# Patient Record
Sex: FEMALE | Race: WHITE | NOT HISPANIC OR LATINO | Employment: FULL TIME | URBAN - METROPOLITAN AREA
[De-identification: names, ages, dates, MRNs, and addresses within clinical notes are randomized per-mention and may not be internally consistent; named-entity substitution may affect disease eponyms.]

---

## 2017-04-11 DIAGNOSIS — L70.8 OTHER ACNE: ICD-10-CM

## 2017-04-11 NOTE — TELEPHONE ENCOUNTER
norgestim-eth estrad triphasic (ORTHO TRI-CYCLEN, 28,) 0.18/0.215/0.25 MG-35 MCG      Last Written Prescription Date: 5/27/16  Last Fill Quantity: 84,  # refills: 3   Last Office Visit with G, P or [facility] prescribing provider: 5/27/16                                         Next 5 appointments (look out 90 days)     May 05, 2017  1:00 PM CDT   MyChart Physical Adult with GUANAKO Damian CNP   [facility]    [address] [address] [phone]

## 2017-04-13 RX ORDER — NORGESTIMATE AND ETHINYL ESTRADIOL 7DAYSX3 28
1 KIT ORAL DAILY
Qty: 84 TABLET | Refills: 3 | OUTPATIENT
Start: 2017-04-13

## 2017-04-13 NOTE — TELEPHONE ENCOUNTER
Pt should have enough med to last till end of May, pt has an upcoming appointment for px on 05/05. Called pharmacy, they notifies that pt should have enough to last for another month. So, will disregard the rx for now.     Kelley, RN  Triage Nurse

## 2017-05-05 ENCOUNTER — OFFICE VISIT (OUTPATIENT)
Dept: FAMILY MEDICINE | Facility: CLINIC | Age: 27
End: 2017-05-05
Payer: COMMERCIAL

## 2017-05-05 VITALS
HEIGHT: 70 IN | WEIGHT: 156.8 LBS | OXYGEN SATURATION: 100 % | BODY MASS INDEX: 22.45 KG/M2 | DIASTOLIC BLOOD PRESSURE: 62 MMHG | HEART RATE: 80 BPM | SYSTOLIC BLOOD PRESSURE: 110 MMHG | RESPIRATION RATE: 16 BRPM | TEMPERATURE: 97.8 F

## 2017-05-05 DIAGNOSIS — L70.9 ACNE: ICD-10-CM

## 2017-05-05 DIAGNOSIS — Z00.00 ROUTINE GENERAL MEDICAL EXAMINATION AT A HEALTH CARE FACILITY: Primary | ICD-10-CM

## 2017-05-05 DIAGNOSIS — L70.8 OTHER ACNE: ICD-10-CM

## 2017-05-05 DIAGNOSIS — Z11.51 SCREENING FOR HUMAN PAPILLOMAVIRUS: ICD-10-CM

## 2017-05-05 PROCEDURE — 99395 PREV VISIT EST AGE 18-39: CPT | Performed by: NURSE PRACTITIONER

## 2017-05-05 PROCEDURE — G0145 SCR C/V CYTO,THINLAYER,RESCR: HCPCS | Performed by: NURSE PRACTITIONER

## 2017-05-05 RX ORDER — NORGESTIMATE AND ETHINYL ESTRADIOL 7DAYSX3 28
1 KIT ORAL DAILY
Qty: 84 TABLET | Refills: 3 | Status: SHIPPED | OUTPATIENT
Start: 2017-05-05 | End: 2017-09-08 | Stop reason: ALTCHOICE

## 2017-05-05 RX ORDER — SULFAMETHOXAZOLE/TRIMETHOPRIM 800-160 MG
1 TABLET ORAL 2 TIMES DAILY
Qty: 60 TABLET | Refills: 1 | Status: SHIPPED | OUTPATIENT
Start: 2017-05-05 | End: 2017-06-09

## 2017-05-05 NOTE — MR AVS SNAPSHOT
After Visit Summary   5/5/2017    Rach Peterson    MRN: [id number]           Patient Information     Date Of Birth          1990        Visit Information        Provider Department      5/5/2017 1:00 PM Claudine Garcia APRN CNP [facility]        Today's Diagnoses     Acne    -  1    Screening for human papillomavirus          Care Instructions      Take bactrim for acne twice a day until you start seeing moderate to significant improvement in acne.  Then try to go to once per day.  Please follow up with me in about 6 weeks.    Watch for new rash.  If you get one stop the bactrim and let us see you            Follow-ups after your visit        Who to contact     If you have questions or need follow up information about today's clinic visit or your schedule please contact [facility] directly at [phone].  Normal or non-critical lab and imaging results will be communicated to you by Qufenqihart, letter or phone within 4 business days after the clinic has received the results. If you do not hear from us within 7 days, please contact the clinic through Qufenqihart or phone. If you have a critical or abnormal lab result, we will notify you by phone as soon as possible.  Submit refill requests through Mobivity or call your pharmacy and they will forward the refill request to us. Please allow 3 business days for your refill to be completed.          Additional Information About Your Visit        MyChart Information     Mobivity gives you secure access to your electronic health record. If you see a primary care provider, you can also send messages to your care team and make appointments. If you have questions, please call your primary care clinic.  If you do not have a primary care provider, please call [phone] and they will assist you.        Care EveryWhere ID     This is your Care EveryWhere ID. This could be used by other organizations to access your [facility]  "medical records  [id number]        Your Vitals Were     Pulse Temperature Respirations Height Pulse Oximetry BMI (Body Mass Index)    80 97.8  F (36.6  C) (Oral) 16 5' 9.5\" (1.765 m) 100% 22.82 kg/m2       Blood Pressure from Last 3 Encounters:   05/05/17 110/62   05/27/16 120/82   05/22/15 110/68    Weight from Last 3 Encounters:   05/05/17 156 lb 12.8 oz (71.1 kg)   05/27/16 148 lb 4.8 oz (67.3 kg)   05/22/15 149 lb (67.6 kg)              We Performed the Following     PAP imaged thin layer screen reflex to HPV if ASCUS - recommended age 25 - 29 years          Today's Medication Changes          These changes are accurate as of: 5/5/17  1:28 PM.  If you have any questions, ask your nurse or doctor.               Start taking these medicines.        Dose/Directions    sulfamethoxazole-trimethoprim 800-160 MG per tablet   Commonly known as:  BACTRIM DS/SEPTRA DS   Used for:  Acne   Started by:  Claudine Garcia APRN CNP        Dose:  1 tablet   Take 1 tablet by mouth 2 times daily   Quantity:  60 tablet   Refills:  1            Where to get your medicines      These medications were sent to [id number] - [place], [place] - [address], [place] [address]     Phone:  [phone]     sulfamethoxazole-trimethoprim 800-160 MG per tablet                Primary Care Provider Office Phone # Fax #    GUANAKO Damian [phone]       [facility] [address] [address] [facility] [address]        Thank you!     Thank you for choosing [facility]  for your care. Our goal is always to provide you with excellent care. Hearing back from our patients is one way we can continue to improve our services. Please take a few minutes to complete the written survey that you may receive in the mail after your visit with us. Thank you!             Your Updated Medication List - Protect others around you: Learn how to " safely use, store and throw away your medicines at www.disposemymeds.org.          This list is accurate as of: 5/5/17  1:28 PM.  Always use your most recent med list.                   Brand Name Dispense Instructions for use    norgestim-eth estrad triphasic 0.18/0.215/0.25 MG-35 MCG per tablet    ORTHO TRI-CYCLEN (28)    84 tablet    Take 1 tablet by mouth daily       sulfamethoxazole-trimethoprim 800-160 MG per tablet    BACTRIM DS/SEPTRA DS    60 tablet    Take 1 tablet by mouth 2 times daily       valACYclovir 500 MG tablet    VALTREX    90 tablet    Take 1 tablet (500 mg) by mouth daily

## 2017-05-05 NOTE — NURSING NOTE
"Chief Complaint   Patient presents with     Physical       Initial /62 (BP Location: Right arm, Patient Position: Chair, Cuff Size: Adult Regular)  Pulse 80  Temp 97.8  F (36.6  C) (Oral)  Resp 16  Ht 5' 9.5\" (1.765 m)  Wt 156 lb 12.8 oz (71.1 kg)  SpO2 100%  BMI 22.82 kg/m2 Estimated body mass index is 22.82 kg/(m^2) as calculated from the following:    Height as of this encounter: 5' 9.5\" (1.765 m).    Weight as of this encounter: 156 lb 12.8 oz (71.1 kg).  Medication Reconciliation: complete   uYli Lemus MA      "

## 2017-05-05 NOTE — PATIENT INSTRUCTIONS
Take bactrim for acne twice a day until you start seeing moderate to significant improvement in acne.  Then try to go to once per day.  Please follow up with me in about 6 weeks.    Watch for new rash.  If you get one stop the bactrim and let us see you

## 2017-05-05 NOTE — PROGRESS NOTES
SUBJECTIVE:     CC: Rach Peterson is an 26 year old woman who presents for preventive health visit.     Physical   Annual:     Getting at least 3 servings of Calcium per day::  Yes    Bi-annual eye exam::  Yes    Dental care twice a year::  NO    Sleep apnea or symptoms of sleep apnea::  None    Diet::  Regular (no restrictions)    Frequency of exercise::  4-5 days/week    Duration of exercise::  45-60 minutes    Taking medications regularly::  Yes    Medication side effects::  None    Additional concerns today::  No        Today's PHQ-2 Score:   PHQ-2 ( 1999 [employer]) 5/5/2017   Q1: Little interest or pleasure in doing things 0   Q2: Feeling down, depressed or hopeless 0   PHQ-2 Score 0   Little interest or pleasure in doing things -   Feeling down, depressed or hopeless -   PHQ-2 Score -     Abuse: Current or Past(Physical, Sexual or Emotional) - No  Do you feel safe in your environment - Yes    Social History   Substance Use Topics     Smoking status: Never Smoker     Smokeless tobacco: Never Used      Comment: Non smoking home     Alcohol use Yes      Comment: four drinks once every two weeks.     The patient does not drink >3 drinks per day nor >7 drinks per week.    No results for input(s): CHOL, HDL, LDL, TRIG, CHOLHDLRATIO, NHDL in the last 28992 hours.    Reviewed orders with patient.  Reviewed health maintenance and updated orders accordingly - Yes    Mammo Decision Support:  Mammogram not appropriate for this patient based on age.    Pertinent mammograms are reviewed under the imaging tab.  History of abnormal Pap smear: NO - age 21-29 PAP every 3 years recommended    Reviewed and updated as needed this visit by clinical staff  Tobacco  Allergies  Meds  Problems  Med Hx  Surg Hx  Fam Hx  Soc Hx          Reviewed and updated as needed this visit by Provider  Allergies  Meds  Problems          REVIEW OF SYSTEMS:  C: NEGATIVE for fever, chills, or change in weight  I: Hx of Acne - Has used  "topicals and stopped them due to increased redness and dryness.  Acne has worsened significantly; NEGATIVE for worrisome rashes, moles or lesions  E: NEGATIVE for vision changes or irritation  E/M: NEGATIVE for ear, mouth and throat problems  R: NEGATIVE for significant cough or SOB  CV: NEGATIVE for chest pain, palpitations or peripheral edema  GI: NEGATIVE for nausea, abdominal pain, heartburn, or change in bowel habits  B: NEGATIVE for masses, tenderness, or nipple discharge  : NEGATIVE for unusual urinary or vaginal symptoms; Periods are regular  M: NEGATIVE for significant arthralgias or myalgia  N: NEGATIVE for weakness, dizziness or paresthesias  E: NEGATIVE for temperature intolerance, or skin/hair changes  P: NEGATIVE for changes in mood or affect    This document serves as a record of the services and decisions personally performed and made by Claudine Garcia NP. It was created on her behalf by Sharmin Vang, a trained medical scribe. The creation of this document is based on the provider's statements to the medical scribe.  Sharmin Vang, March 24, 2017, 1:09 PM     Problem list, Medication list, Allergies, and Medical/Social/Surgical histories reviewed in [facility] and updated as appropriate.  Labs reviewed in EPIC  OBJECTIVE:     /62 (BP Location: Right arm, Patient Position: Chair, Cuff Size: Adult Regular)  Pulse 80  Temp 97.8  F (36.6  C) (Oral)  Resp 16  Ht 5' 9.5\" (1.765 m)  Wt 156 lb 12.8 oz (71.1 kg)  SpO2 100%  BMI 22.82 kg/m2    EXAM:  GENERAL: Patient appears healthy, alert and not distressed.  EYES: Eyes appear grossly normal to inspection, with normal conjunctivae and sclerae  HENT: Ear canals and TM's appear normal, nose and mouth are without ulcers or lesions, oropharynx is clear and oral mucous membranes are moist  NECK: No adenopathy present, no asymmetry, masses, or scars noted, thyroid is normal to palpation  RESP: Lungs are clear to auscultation - no rales, rhonchi or " "wheezes present  CV: Regular rate and rhythm, normal S1 S2 heart sounds, no ectopy, no peripheral edema present, peripheral pulses normal, no carotid bruit.  ABDOMEN: Soft, nontender, no hepatosplenomegaly, no masses, normal bowel sounds  MS: No gross musculoskeletal defects noted, no edema, gait is age appropriate without ataxia  SKIN: No suspicious rashes, lesions, or moles.  2+ papulopustular and mild cystic acne noted.  Predominately forehead and central face  NEURO: Patient exhibits normal strength and tone, normal speech and mentation  PSYCH: Mentation appears normal, affect is normal/bright    Diagnostic Test Results:  No results found for this or any previous visit (from the past 24 hour(s)).     ASSESSMENT/PLAN:         ICD-10-CM    1. Routine general medical examination at a health care facility Z00.00 PAP imaged thin layer screen reflex to HPV if ASCUS - recommended age 25 - 29 years   2. Acne L70.9 sulfamethoxazole-trimethoprim (BACTRIM DS/SEPTRA DS) 800-160 MG per tablet   3. Screening for human papillomavirus Z11.51 PAP imaged thin layer screen reflex to HPV if ASCUS - recommended age 25 - 29 years   4. Other acne L70.8 norgestim-eth estrad triphasic (ORTHO TRI-CYCLEN, 28,) 0.18/0.215/0.25 MG-35 MCG per tablet       COUNSELING:  Reviewed preventive health counseling, as reflected in patient instructions  Special attention given to:        Regular exercise       Healthy diet/nutrition     reports that she has never smoked. She has never used smokeless tobacco.    Estimated body mass index is 22.82 kg/(m^2) as calculated from the following:    Height as of this encounter: 5' 9.5\" (1.765 m).    Weight as of this encounter: 156 lb 12.8 oz (71.1 kg).       Counseling Resources:  ATP IV Guidelines  Pooled Cohorts Equation Calculator  Breast Cancer Risk Calculator  FRAX Risk Assessment  ICSI Preventive Guidelines  Dietary Guidelines for Americans, 2010  USDA's MyPlate  ASA Prophylaxis  Lung CA " Screening    The information in this document, created by a medical scribe for me, accurately reflects the services I personally performed and the decisions made by me. I have reviewed and approved this document for accuracy.  Claudine Garcia, May 5, 2017, 1:29 PM     GUANAKO Damian [facility]

## 2017-05-06 ENCOUNTER — MYC MEDICAL ADVICE (OUTPATIENT)
Dept: FAMILY MEDICINE | Facility: CLINIC | Age: 27
End: 2017-05-06

## 2017-05-10 LAB
COPATH REPORT: NORMAL
PAP: NORMAL

## 2017-06-09 ENCOUNTER — OFFICE VISIT (OUTPATIENT)
Dept: FAMILY MEDICINE | Facility: CLINIC | Age: 27
End: 2017-06-09
Payer: COMMERCIAL

## 2017-06-09 VITALS
TEMPERATURE: 97.8 F | BODY MASS INDEX: 22.6 KG/M2 | HEIGHT: 70 IN | HEART RATE: 74 BPM | SYSTOLIC BLOOD PRESSURE: 116 MMHG | RESPIRATION RATE: 16 BRPM | DIASTOLIC BLOOD PRESSURE: 62 MMHG | OXYGEN SATURATION: 98 % | WEIGHT: 157.9 LBS

## 2017-06-09 DIAGNOSIS — L70.0 ACNE VULGARIS: ICD-10-CM

## 2017-06-09 DIAGNOSIS — Z11.8 SPECIAL SCREENING EXAMINATION FOR CHLAMYDIAL DISEASE: Primary | ICD-10-CM

## 2017-06-09 PROCEDURE — 99213 OFFICE O/P EST LOW 20 MIN: CPT | Performed by: NURSE PRACTITIONER

## 2017-06-09 RX ORDER — CEFUROXIME AXETIL 500 MG/1
500 TABLET ORAL 2 TIMES DAILY
Qty: 60 TABLET | Refills: 0 | Status: SHIPPED | OUTPATIENT
Start: 2017-06-09 | End: 2017-07-07

## 2017-06-09 NOTE — NURSING NOTE
"Chief Complaint   Patient presents with     Derm Problem       Initial /62 (BP Location: Right arm, Patient Position: Chair, Cuff Size: Adult Regular)  Pulse 74  Temp 97.8  F (36.6  C) (Oral)  Resp 16  Ht 5' 9.5\" (1.765 m)  Wt 157 lb 14.4 oz (71.6 kg)  SpO2 98%  BMI 22.98 kg/m2 Estimated body mass index is 22.98 kg/(m^2) as calculated from the following:    Height as of this encounter: 5' 9.5\" (1.765 m).    Weight as of this encounter: 157 lb 14.4 oz (71.6 kg).  Medication Reconciliation: complete   Yuli Lemus MA       "

## 2017-06-09 NOTE — MR AVS SNAPSHOT
After Visit Summary   6/9/2017    Rach Peterson    MRN: [id number]           Patient Information     Date Of Birth          1990        Visit Information        Provider Department      6/9/2017 3:00 PM Claudine Garcia APRN CNP [facility]        Today's Diagnoses     Special screening examination for chlamydial disease    -  1    Acne vulgaris           Follow-ups after your visit        Follow-up notes from your care team     Return in about 1 month (around 7/9/2017) for Recheck.      Who to contact     If you have questions or need follow up information about today's clinic visit or your schedule please contact [facility] directly at [phone].  Normal or non-critical lab and imaging results will be communicated to you by MyChart, letter or phone within 4 business days after the clinic has received the results. If you do not hear from us within 7 days, please contact the clinic through [email or web address] or phone. If you have a critical or abnormal lab result, we will notify you by phone as soon as possible.  Submit refill requests through TeleCommunication Systems or call your pharmacy and they will forward the refill request to us. Please allow 3 business days for your refill to be completed.          Additional Information About Your Visit        MyChart Information     TeleCommun[employer] Systems gives you secure access to your electronic health record. If you see a primary care provider, you can also send messages to your care team and make appointments. If you have questions, please call your primary care clinic.  If you do not have a primary care provider, please call [phone] and they will assist you.        Care EveryWhere ID     This is your Care EveryWhere ID. This could be used by other organizations to access your [facility] medical records  [id number]        Your Vitals Were     Pulse Temperature Respirations Height Pulse Oximetry BMI (Body Mass Index)    74 97.8  F (36.6  C)  "(Oral) 16 5' 9.5\" (1.765 m) 98% 22.98 kg/m2       Blood Pressure from Last 3 Encounters:   06/09/17 116/62   05/05/17 110/62   05/27/16 120/82    Weight from Last 3 Encounters:   06/09/17 157 lb 14.4 oz (71.6 kg)   05/05/17 156 lb 12.8 oz (71.1 kg)   05/27/16 148 lb 4.8 oz (67.3 kg)              Today, you had the following     No orders found for display         Today's Medication Changes          These changes are accurate as of: 6/9/17  3:26 PM.  If you have any questions, ask your nurse or doctor.               Start taking these medicines.        Dose/Directions    cefuroxime 500 MG tablet   Commonly known as:  CEFTIN   Used for:  Acne vulgaris   Started by:  Claudine Garcia APRN CNP        Dose:  500 mg   Take 1 tablet (500 mg) by mouth 2 times daily   Quantity:  60 tablet   Refills:  0         Stop taking these medicines if you haven't already. Please contact your care team if you have questions.     sulfamethoxazole-trimethoprim 800-160 MG per tablet   Commonly known as:  BACTRIM DS/SEPTRA DS   Stopped by:  Claudine Garcia APRN CNP                Where to get your medicines      These medications were sent to [id number] - [place] - [address]  [address] [facility]  [address] [place] [address]     Phone:  [phone]     cefuroxime 500 MG tablet                Primary Care Provider Office Phone # Fax #    GUANAKO Damian [phone]       [facility] [address]  [facility] [address]        Thank you!     Thank you for choosing [facility]  for your care. Our goal is always to provide you with excellent care. Hearing back from our patients is one way we can continue to improve our services. Please take a few minutes to complete the written survey that you may receive in the mail after your visit with us. Thank you!             Your Updated Medication List - Protect others around you: Learn how to " safely use, store and throw away your medicines at www.disposemymeds.org.          This list is accurate as of: 6/9/17  3:26 PM.  Always use your most recent med list.                   Brand Name Dispense Instructions for use    cefuroxime 500 MG tablet    CEFTIN    60 tablet    Take 1 tablet (500 mg) by mouth 2 times daily       norgestim-eth estrad triphasic 0.18/0.215/0.25 MG-35 MCG per tablet    ORTHO TRI-CYCLEN (28)    84 tablet    Take 1 tablet by mouth daily       valACYclovir 500 MG tablet    VALTREX    90 tablet    Take 1 tablet (500 mg) by mouth daily

## 2017-06-09 NOTE — PROGRESS NOTES
"  SUBJECTIVE:                                                    Rach Peterson is a 26 year old female who presents to clinic today for the following health issues:    Acne       Duration: ***    Description (location/character/radiation): ***    Intensity:  {mild,moderate,severe:178061}    Accompanying signs and symptoms: ***    History (similar episodes/previous evaluation): {.:571477::\"None\"}    Precipitating or alleviating factors: {.:486167::\"None\"}    Therapies tried and outcome: {.:314182::\"None\"}       Other Concerns:  ***    Problem list and histories reviewed & adjusted, as indicated.  Additional history: as documented    Labs reviewed in EPIC    Reviewed and updated as needed this visit by clinical staff       Reviewed and updated as needed this visit by Provider       REVIEW OF SYSTEMS:  C: NEGATIVE for fever, chills, or change in weight  I: NEGATIVE for worrisome rashes, moles or lesions***  E: NEGATIVE for vision changes or irritation  E/M: NEGATIVE for ear, mouth and throat problems  R: NEGATIVE for significant cough or SOB  CV: NEGATIVE for chest pain, palpitations or peripheral edema  GI: NEGATIVE for nausea, abdominal pain, heartburn, or change in bowel habits  B: NEGATIVE for masses, tenderness, or nipple discharge***  : NEGATIVE for unusual urinary or vaginal symptoms; Periods are regular***  M: NEGATIVE for significant arthralgias or myalgia  N: NEGATIVE for weakness, dizziness or paresthesias  E: NEGATIVE for temperature intolerance, or skin/hair changes  H: NEGATIVE for bleeding problems***  P: NEGATIVE for changes in mood or affect    This document serves as a record of the services and decisions personally performed and made by Claudine Garcia NP. It was created on her behalf by Sharmin Vang, a trained medical scribe. The creation of this document is based on the provider's statements to the medical scribe.  Davonte Lopez, June 9, 2017, ***    OBJECTIVE:                         "                            There were no vitals taken for this visit.  There is no height or weight on file to calculate BMI.    EXAM:  GENERAL: Patient appears healthy, alert and not distressed.  EYES: Eyes appear grossly normal to inspection, with normal conjunctivae and sclerae  HENT: Ear canals and TM's appear normal, nose and mouth are without ulcers or lesions, oropharynx is clear and oral mucous membranes are moist***  NECK: No adenopathy present, no asymmetry, masses, or scars noted, thyroid is normal to palpation***  RESP: Lungs are clear to auscultation - no rales, rhonchi or wheezes present  CV: Regular rate and rhythm, normal S1 S2 heart sounds, no ectopy, no peripheral edema present, peripheral pulses normal, no carotid bruit.  ABDOMEN: Soft, nontender, no hepatosplenomegaly, no masses, normal bowel sounds  MS: No gross musculoskeletal defects noted, no edema, gait is age appropriate without ataxia  SKIN: No suspicious rashes, lesions, or moles.***  NEURO: Patient exhibits normal strength and tone, normal speech and mentation  PSYCH: Mentation appears normal, affect is normal/bright    Diagnostic Test Results:  No results found for this or any previous visit (from the past 24 hour(s)).      ASSESSMENT/PLAN:                                                              ICD-10-CM    1. Special screening examination for chlamydial disease Z11.8        There are no Patient Instructions on file for this visit.     The information in this document, created by a medical scribe for me, accurately reflects the services I personally performed and the decisions made by me. I have reviewed and approved this document for accuracy.  Claudine Garcia, June 9, 2017, ***    GUANAKO Damian [facility]

## 2017-06-09 NOTE — PROGRESS NOTES
"  SUBJECTIVE:                                                    Rach Peterson is a 26 year old female who presents to clinic today for the following health issues:    Pt is here to follow up on her acne. Pt states that it has not improved since being seeing last.       Reviewed and updated as needed this visit by clinical staff       Reviewed and updated as needed this visit by Provider         ROS:  Constitutional, HEENT, cardiovascular, pulmonary, gi and gu systems are negative, except as otherwise noted.    OBJECTIVE:     /62 (BP Location: Right arm, Patient Position: Chair, Cuff Size: Adult Regular)  Pulse 74  Temp 97.8  F (36.6  C) (Oral)  Resp 16  Ht 5' 9.5\" (1.765 m)  Wt 157 lb 14.4 oz (71.6 kg)  SpO2 98%  BMI 22.98 kg/m2  Body mass index is 22.98 kg/(m^2).  Moderate papulopusular acen mildly improved from last visit.  Irritatino and redness is improved..  No cystic acne noted. Today.    Diagnostic Test Results:  none     ASSESSMENT/PLAN:     A/P:    ICD-10-CM    1. Special screening examination for chlamydial disease Z11.8    2. Acne vulgaris L70.0 cefuroxime (CEFTIN) 500 MG tablet      Change to ceftin short term and follow up in 6 weeks.  Improving and will try to wean antibiotics  As indicated. Pt is opposed to accutane.  Is opposed to topicals.  Is tearful and hopeful that she can improve.      GUANAKO Damian [facility] [place]  "

## 2017-07-07 ENCOUNTER — OFFICE VISIT (OUTPATIENT)
Dept: FAMILY MEDICINE | Facility: CLINIC | Age: 27
End: 2017-07-07
Payer: COMMERCIAL

## 2017-07-07 VITALS
TEMPERATURE: 98.9 F | SYSTOLIC BLOOD PRESSURE: 114 MMHG | OXYGEN SATURATION: 98 % | WEIGHT: 155 LBS | BODY MASS INDEX: 22.56 KG/M2 | DIASTOLIC BLOOD PRESSURE: 72 MMHG | HEART RATE: 68 BPM

## 2017-07-07 DIAGNOSIS — L70.0 ACNE VULGARIS: ICD-10-CM

## 2017-07-07 PROCEDURE — 99213 OFFICE O/P EST LOW 20 MIN: CPT | Performed by: NURSE PRACTITIONER

## 2017-07-07 RX ORDER — CEFUROXIME AXETIL 500 MG/1
500 TABLET ORAL 2 TIMES DAILY
Qty: 120 TABLET | Refills: 0 | Status: SHIPPED | OUTPATIENT
Start: 2017-07-07 | End: 2017-10-20 | Stop reason: ALTCHOICE

## 2017-07-07 NOTE — MR AVS SNAPSHOT
After Visit Summary   7/7/2017    Rach Peterson    MRN: [id number]           Patient Information     Date Of Birth          1990        Visit Information        Provider Department      7/7/2017 3:00 PM Claudine Garcia APRN CNP [facility]        Today's Diagnoses     Acne vulgaris          Care Instructions    Use yogurt to replenish good bacteria    Start trying to wean ceftin in 4 weeks.    Follow up with me in 8 weeks or as needed.          Follow-ups after your visit        Who to contact     If you have questions or need follow up information about today's clinic visit or your schedule please contact [facility] directly at [phone].  Normal or non-critical lab and imaging results will be communicated to you by MyChart, letter or phone within 4 business days after the clinic has received the results. If you do not hear from us within 7 days, please contact the clinic through [employer] or phone. If you have a critical or abnormal lab result, we will notify you by phone as soon as possible.  Submit refill requests through [employer] or call your pharmacy and they will forward the refill request to us. Please allow 3 business days for your refill to be completed.          Additional Information About Your Visit        MyChart Information     SPH[employer]ES gives you secure access to your electronic health record. If you see a primary care provider, you can also send messages to your care team and make appointments. If you have questions, please call your primary care clinic.  If you do not have a primary care provider, please call [phone] and they will assist you.        Care EveryWhere ID     This is your Care EveryWhere ID. This could be used by other organizations to access your [place] medical records  [id number]        Your Vitals Were     Pulse Temperature Pulse Oximetry BMI (Body Mass Index)          68 98.9  F (37.2  C) (Oral) 98% 22.56 kg/m2          Blood Pressure from Last 3 Encounters:   07/07/17 114/72   06/09/17 116/62   05/05/17 110/62    Weight from Last 3 Encounters:   07/07/17 155 lb (70.3 kg)   06/09/17 157 lb 14.4 oz (71.6 kg)   05/05/17 156 lb 12.8 oz (71.1 kg)              Today, you had the following     No orders found for display         Where to get your medicines      These medications were sent to [id number] [place] - [address]  AT [facility]  [address] [place] [address]     Phone:  [phone]     cefuroxime 500 MG tablet          Primary Care Provider Office Phone # Fax #    Claudine Garcia APRSUPRIYA [phone]       [facility] [address] [address] [facility] [address]        Equal Access to Services     HINA SALDANA AH: Hadii aad ku hadasho Soomaali, waaxda luqadaha, qaybta kaalmada adeegyada, waxay simain hayjuan manueln mitali quiroga . So [facility] [phone].    ATENCIÓN: Si habla español, tiene a quiñones disposición servicios gratuitos de asistencia lingüística. Llame al [phone].    We comply with applicable federal civil rights laws and [place] laws. We do not discriminate on the basis of race, color, national origin, age, disability sex, sexual orientation or gender identity.            Thank you!     Thank you for choosing [facility]  for your care. Our goal is always to provide you with excellent care. Hearing back from our patients is one way we can continue to improve our services. Please take a few minutes to complete the written survey that you may receive in the mail after your visit with us. Thank you!             Your Updated Medication List - Protect others around you: Learn how to safely use, store and throw away your medicines at www.disposemymeds.org.          This list is accurate as of: 7/7/17  3:56 PM.  Always use your most recent med list.                   Brand Name Dispense Instructions for use Diagnosis     cefuroxime 500 MG tablet    CEFTIN    120 tablet    Take 1 tablet (500 mg) by mouth 2 times daily    Acne vulgaris       norgestim-eth estrad triphasic 0.18/0.215/0.25 MG-35 MCG per tablet    ORTHO TRI-CYCLEN (28)    84 tablet    Take 1 tablet by mouth daily    Other acne       valACYclovir 500 MG tablet    VALTREX    90 tablet    Take 1 tablet (500 mg) by mouth daily    Herpes simplex type 2 infection

## 2017-07-07 NOTE — PATIENT INSTRUCTIONS
Use yogurt to replenish good bacteria    Start trying to wean ceftin in 4 weeks.    Follow up with me in 8 weeks or as needed.

## 2017-07-07 NOTE — NURSING NOTE
"Chief Complaint   Patient presents with     RECHECK       Initial /72 (BP Location: Right arm, Patient Position: Chair, Cuff Size: Adult Regular)  Pulse 68  Temp 98.9  F (37.2  C) (Oral)  Wt 155 lb (70.3 kg)  SpO2 98%  BMI 22.56 kg/m2 Estimated body mass index is 22.56 kg/(m^2) as calculated from the following:    Height as of 6/9/17: 5' 9.5\" (1.765 m).    Weight as of this encounter: 155 lb (70.3 kg).  Medication Reconciliation: complete.Nan FREIRE MA      "

## 2017-07-07 NOTE — PROGRESS NOTES
SUBJECTIVE:                                                    Rach Peterson is a 26 year old female who presents to clinic today for the following health issues:    1month follow up on acne vulgaris.     Other Concerns:  none    Problem list and histories reviewed & adjusted, as indicated.  Additional history: as documented    Labs reviewed in EPIC    Reviewed and updated as needed this visit by clinical staff  Tobacco  Allergies  Meds  Problems  Med Hx  Surg Hx  Fam Hx  Soc Hx        Reviewed and updated as needed this visit by Provider  Allergies  Meds  Problems       REVIEW OF SYSTEMS:  I: NEGATIVE for worrisome rashes, moles or lesions  GI: NEGATIVE for nausea, abdominal pain, heartburn, or change in bowel habits  P: NEGATIVE for changes in mood or affect    This document serves as a record of the services and decisions personally performed and made by Claudine Garcia NP. It was created on her behalf by Davonte Lopez, a trained medical scribe. The creation of this document is based on the provider's statements to the medical scribe.  Davonte Lopez, July 7, 2017,     OBJECTIVE:   /72 (BP Location: Right arm, Patient Position: Chair, Cuff Size: Adult Regular)  Pulse 68  Temp 98.9  F (37.2  C) (Oral)  Wt 155 lb (70.3 kg)  SpO2 98%  BMI 22.56 kg/m2  Body mass index is 22.56 kg/(m^2).    EXAM:  GENERAL: Patient appears healthy, alert and not distressed.  PSYCH: Mentation appears normal, affect is normal/bright  SKIN:  Acne is about 50% improved    Diagnostic Test Results:  No results found for this or any previous visit (from the past 24 hour(s)).     ASSESSMENT/PLAN:       ICD-10-CM    1. Acne vulgaris L70.0 cefuroxime (CEFTIN) 500 MG tablet     Improved significantly.  Patient is quite happy.  Will continue meds and then wean.  Pt understands this med won't be long term therapy.    Patient Instructions   Use yogurt to replenish good bacteria    Start trying to wean ceftin in 4  weeks.    Follow up with me in 8 weeks or as needed.      The information in this document, created by a medical scribe for me, accurately reflects the services I personally performed and the decisions made by me. I have reviewed and approved this document for accuracy.  Claudine Garcia, July 7, 2017,    GUANAKO Damian [facility]

## 2017-09-08 ENCOUNTER — OFFICE VISIT (OUTPATIENT)
Dept: FAMILY MEDICINE | Facility: CLINIC | Age: 27
End: 2017-09-08
Payer: COMMERCIAL

## 2017-09-08 VITALS
HEART RATE: 71 BPM | OXYGEN SATURATION: 100 % | BODY MASS INDEX: 23 KG/M2 | WEIGHT: 158 LBS | TEMPERATURE: 98.6 F | SYSTOLIC BLOOD PRESSURE: 110 MMHG | DIASTOLIC BLOOD PRESSURE: 70 MMHG

## 2017-09-08 DIAGNOSIS — L70.8 OTHER ACNE: ICD-10-CM

## 2017-09-08 DIAGNOSIS — Z30.41 ENCOUNTER FOR SURVEILLANCE OF CONTRACEPTIVE PILLS: Primary | ICD-10-CM

## 2017-09-08 PROCEDURE — 99213 OFFICE O/P EST LOW 20 MIN: CPT | Performed by: NURSE PRACTITIONER

## 2017-09-08 RX ORDER — DROSPIRENONE AND ETHINYL ESTRADIOL 0.03MG-3MG
1 KIT ORAL DAILY
Qty: 84 TABLET | Refills: 3 | Status: SHIPPED | OUTPATIENT
Start: 2017-09-08 | End: 2018-05-25

## 2017-09-08 NOTE — PROGRESS NOTES
"   SUBJECTIVE:   CC: Rach Peterson is an 26 year old woman who presents for preventive health visit.     HPI  {Outside tests to abstract? :292626}    {additional problems to add (Optional):193867}    Today's PHQ-2 Score:   PHQ-2 ( 1999 [employer]) 5/5/2017   Q1: Little interest or pleasure in doing things 0   Q2: Feeling down, depressed or hopeless 0   PHQ-2 Score 0   Q1: Little interest or pleasure in doing things -   Q2: Feeling down, depressed or hopeless -   PHQ-2 Score -       Abuse: Current or Past(Physical, Sexual or Emotional)- {YES/NO/NA:690454}  Do you feel safe in your environment - {YES/NO/NA:975516}    Social History   Substance Use Topics     Smoking status: Never Smoker     Smokeless tobacco: Never Used      Comment: Non smoking home     Alcohol use Yes      Comment: four drinks once every two weeks.     {ETOH AUDIT:127410}    Reviewed orders with patient.  Reviewed health maintenance and updated orders accordingly - {Yes/No:135157::\"Yes\"}  {Chronicprobdata (Optional):588707}    {Mammo Decision Support (Optional):749180}    Pertinent mammograms are reviewed under the imaging tab.  History of abnormal Pap smear: {PAP HX:522690}    Reviewed and updated as needed this visit by clinical staff  Tobacco  Allergies  Meds  Problems  Med Hx  Surg Hx  Fam Hx  Soc Hx          Reviewed and updated as needed this visit by Provider  Allergies  Meds  Problems        {HISTORY OPTIONS (Optional):628750}    ROS:  {FEMALE PREVENTATIVE ROS:098141}     OBJECTIVE:   /70 (BP Location: Right arm, Patient Position: Chair, Cuff Size: Adult Regular)  Pulse 71  Temp 98.6  F (37  C) (Oral)  Wt 71.7 kg (158 lb)  SpO2 100%  BMI 23 kg/m2  EXAM:  {Exam Choices:417348}    ASSESSMENT/PLAN:   {Diag Picklist:975184}    COUNSELING:  {FEMALE COUNSELING MESSAGES:478678::\"Reviewed preventive health counseling, as reflected in patient instructions\"}    {BP Counseling- Complete if BP >= 120/80  (Optional):245166}     " "reports that she has never smoked. She has never used smokeless tobacco.  {Tobacco Cessation -- Complete if patient is a smoker (Optional):353831}  Estimated body mass index is 23 kg/(m^2) as calculated from the following:    Height as of 6/9/17: 1.765 m (5' 9.5\").    Weight as of this encounter: 71.7 kg (158 lb).   {Weight Management Plan (ACO) Complete if BMI is abnormal-  Ages 18-64  BMI >24.9.  Age 65+ with BMI <23 or >30 (Optional):039523}    Counseling Resources:  ATP IV Guidelines  Pooled Cohorts Equation Calculator  Breast Cancer Risk Calculator  FRAX Risk Assessment  ICSI Preventive Guidelines  Dietary Guidelines for Americans, 2010  USDA's MyPlate  ASA Prophylaxis  Lung CA Screening    GUANAKO Damian [facility]  "

## 2017-09-08 NOTE — PATIENT INSTRUCTIONS
Switch to Farideh for 6-8 weeks after menstruation.     Continue taking Ceftin. Request a refill through [employer] if needed.     Eat more yogurt and consider taking a probiotic.    Okay to schedule e-visit.

## 2017-09-08 NOTE — NURSING NOTE
"Chief Complaint   Patient presents with     RECHECK     acne       Initial /70 (BP Location: Right arm, Patient Position: Chair, Cuff Size: Adult Regular)  Pulse 71  Temp 98.6  F (37  C) (Oral)  Wt 158 lb (71.7 kg)  SpO2 100%  BMI 23 kg/m2 Estimated body mass index is 23 kg/(m^2) as calculated from the following:    Height as of 6/9/17: 5' 9.5\" (1.765 m).    Weight as of this encounter: 158 lb (71.7 kg).  Medication Reconciliation: complete.Nan FREIRE MA      "

## 2017-09-08 NOTE — MR AVS SNAPSHOT
After Visit Summary   9/8/2017    Rach Peterson    MRN: [id number]           Patient Information     Date Of Birth          1990        Visit Information        Provider Department      9/8/2017 1:00 PM Claudine Garcia APRN CNP [facility]        Today's Diagnoses     Encounter for surveillance of contraceptive pills    -  1      Care Instructions    Switch to Farideh for 6-8 weeks after menstruation.     Continue taking Ceftin. Request a refill through [employer] if needed.     Eat more yogurt and consider taking a probiotic.              Follow-ups after your visit        Who to contact     If you have questions or need follow up information about today's clinic visit or your schedule please contact [facility] directly at [phone].  Normal or non-critical lab and imaging results will be communicated to you by MyChart, letter or phone within 4 business days after the clinic has received the results. If you do not hear from us within 7 days, please contact the clinic through [employer] or phone. If you have a critical or abnormal lab result, we will notify you by phone as soon as possible.  Submit refill requests through [employer] or call your pharmacy and they will forward the refill request to us. Please allow 3 business days for your refill to be completed.          Additional Information About Your Visit        MyChart Information     [employer] gives you secure access to your electronic health record. If you see a primary care provider, you can also send messages to your care team and make appointments. If you have questions, please call your primary care clinic.  If you do not have a primary care provider, please call [phone] and they will assist you.        Care EveryWhere ID     This is your Care EveryWhere ID. This could be used by other organizations to access your [place] medical records  [id number]        Your Vitals Were     Pulse Temperature  Pulse Oximetry BMI (Body Mass Index)          71 98.6  F (37  C) (Oral) 100% 23 kg/m2         Blood Pressure from Last 3 Encounters:   09/08/17 110/70   07/07/17 114/72   06/09/17 116/62    Weight from Last 3 Encounters:   09/08/17 158 lb (71.7 kg)   07/07/17 155 lb (70.3 kg)   06/09/17 157 lb 14.4 oz (71.6 kg)              Today, you had the following     No orders found for display         Today's Medication Changes          These changes are accurate as of: 9/8/17  1:20 PM.  If you have any questions, ask your nurse or doctor.               Start taking these medicines.        Dose/Directions    drospirenone-ethinyl estradiol 3-0.03 MG per tablet   Commonly known as:  CHARITY   Used for:  Encounter for surveillance of contraceptive pills   Started by:  Claudine Garcia APRN CNP        Dose:  1 tablet   Take 1 tablet by mouth daily   Quantity:  84 tablet   Refills:  3         Stop taking these medicines if you haven't already. Please contact your care team if you have questions.     norgestim-eth estrad triphasic 0.18/0.215/0.25 MG-35 MCG per tablet   Commonly known as:  ORTHO TRI-CYCLEN (28)   Stopped by:  Claudine Garcia APRN CNP                Where to get your medicines      These medications were sent to [id number] - [place], [place] - [address], [place] [address]     Phone:  [phone]     drospirenone-ethinyl estradiol 3-0.03 MG per tablet                Primary Care Provider Office Phone # Fax #    GUANAKO Damian [phone]       [facility] [address] [facility] [address]        Equal Access to Services     LESLI SALDANA AH: Connie Austin, wachrisda luqadaha, qaybta kaalmada yessica, reena acosta. So [facility] [phone].    ATENCIÓN: Si habla español, tiene a quiñones disposición servicios gratuitos de asistencia lingüística. Llame al  [phone].    We comply with applicable federal civil rights laws and [place] laws. We do not discriminate on the basis of race, color, national origin, age, disability sex, sexual orientation or gender identity.            Thank you!     Thank you for choosing [facility]  for your care. Our goal is always to provide you with excellent care. Hearing back from our patients is one way we can continue to improve our services. Please take a few minutes to complete the written survey that you may receive in the mail after your visit with us. Thank you!             Your Updated Medication List - Protect others around you: Learn how to safely use, store and throw away your medicines at www.disposemymeds.org.          This list is accurate as of: 9/8/17  1:20 PM.  Always use your most recent med list.                   Brand Name Dispense Instructions for use Diagnosis    cefuroxime 500 MG tablet    CEFTIN    120 tablet    Take 1 tablet (500 mg) by mouth 2 times daily    Acne vulgaris       drospirenone-ethinyl estradiol 3-0.03 MG per tablet    CHARITY    84 tablet    Take 1 tablet by mouth daily    Encounter for surveillance of contraceptive pills       valACYclovir 500 MG tablet    VALTREX    90 tablet    Take 1 tablet (500 mg) by mouth daily    Herpes simplex type 2 infection

## 2017-09-08 NOTE — PROGRESS NOTES
SUBJECTIVE:   Rach Peterson is a 26 year old female who presents to clinic today for the following health issues:    Acne  Patient reports acne has improved significantly and is very pleased. She is now taking ceftin only once every other day, because taking it every day was hard on her stomach. She notes she is open to changing her OCP to Farideh but inquires about possible risks. She is not currently taking a probiotic, but occasionally eats yogurt.      Social History   Went to [place] and [place] for ten days. She will be starting a new job on Tuesday.       Problem list and histories reviewed & adjusted, as indicated.  Additional history: as documented    Labs reviewed in EPIC    Reviewed and updated as needed this visit by clinical staff  Tobacco  Allergies  Meds  Problems  Med Hx  Surg Hx  Fam Hx  Soc Hx        Reviewed and updated as needed this visit by Provider  Allergies  Meds  Problems             Patient Active Problem List   Diagnosis     Other acne     CARDIOVASCULAR SCREENING; LDL GOAL LESS THAN 160     Past Surgical History:   Procedure Laterality Date     HC REMOVE TONSILS/ADENOIDS,<12 Y/O      T & A <13y.o.       Social History   Substance Use Topics     Smoking status: Never Smoker     Smokeless tobacco: Never Used      Comment: Non smoking home     Alcohol use Yes      Comment: four drinks once every two weeks.     Family History   Problem Relation Age of Onset     CANCER Maternal Aunt      Melanoma           ROS:  C: NEGATIVE for fever, chills, change in weight  INTEGUMENTARY/SKIN: NEGATIVE for worrisome rashes, moles or lesions  PSYCHIATRIC: NEGATIVE for changes in mood or affect    This document serves as a record of the services and decisions personally performed and made by GUANAKO Damian CNP. It was created on his/her behalf by Alexis Hernández, a trained medical scribe. The creation of this document is based the provider's statements to the medical  pati.  Pati Alexis Hernández 1:07 PM, September 8, 2017    OBJECTIVE:                                                    /70 (BP Location: Right arm, Patient Position: Chair, Cuff Size: Adult Regular)  Pulse 71  Temp 98.6  F (37  C) (Oral)  Wt 158 lb (71.7 kg)  SpO2 100%  BMI 23 kg/m2  Body mass index is 23 kg/(m^2).  GENERAL APPEARANCE: healthy, alert and no distress  SKIN: Acne is essentially resolved.  Healing noted with post inflammatory changes. No acute lesions.  PSYCH: mentation appears normal and affect normal/bright    Diagnostic Test Results:  none      ASSESSMENT/PLAN:                                                    A/P:    ICD-10-CM    1. Encounter for surveillance of contraceptive pills Z30.41 drospirenone-ethinyl estradiol (FARIDEH) 3-0.03 MG per tablet   2. Other acne L70.8      Acne much improved.  Trial of Farideh to see if further maintenance and improvement.  The wean antibiotics off.  Pt aware of risks of antibiotics use.    Total visit time 15; over one half spent discussing current issues.    Patient Instructions   Switch to Farideh for 6-8 weeks after menstruation.     Continue taking Ceftin. Request a refill through [employer] if needed.     Eat more yogurt and consider taking a probiotic.    Okay to schedule e-visit.         The information in this document, created by the medical scribe for me, accurately reflects the services I personally performed and the decisions made by me. I have reviewed and approved this document for accuracy prior to leaving the patient care area.  1:20 PM, 09/08/17    GUANAKO Damian [facility]

## 2017-10-13 ENCOUNTER — E-VISIT (OUTPATIENT)
Dept: FAMILY MEDICINE | Facility: CLINIC | Age: 27
End: 2017-10-13
Payer: COMMERCIAL

## 2017-10-13 DIAGNOSIS — L70.0 ACNE VULGARIS: Primary | ICD-10-CM

## 2017-10-13 PROCEDURE — 99444 ZZC PHYSICIAN ONLINE EVALUATION & MANAGEMENT SERVICE: CPT | Performed by: NURSE PRACTITIONER

## 2017-10-20 RX ORDER — DOXYCYCLINE HYCLATE 100 MG
100 TABLET ORAL 2 TIMES DAILY
Qty: 60 TABLET | Refills: 2 | Status: SHIPPED | OUTPATIENT
Start: 2017-10-20 | End: 2018-05-02

## 2018-05-02 DIAGNOSIS — L70.0 ACNE VULGARIS: ICD-10-CM

## 2018-05-02 NOTE — TELEPHONE ENCOUNTER
doxycycline (VIBRA-TABS) 100 MG tablet      Last Written Prescription Date:  10/20/2017  Last Fill Quantity: 60 tablet,   # refills: 2  Last Office Visit: 09/08/2017 Claudine Garcia APRN CNP   Future Office visit:    Next 5 appointments (look out 90 days)     May 25, 2018  3:00 PM CDT   [facility] with GUANAKO Damian CNP   [address] [address] [phone]                   Routing refill request to provider for review/approval because:  Drug not on the [facility], [facility] or  Health refill protocol or controlled substance  Drug not active on patient's medication list

## 2018-05-04 RX ORDER — DOXYCYCLINE HYCLATE 100 MG
TABLET ORAL
Qty: 60 TABLET | Refills: 4 | Status: SHIPPED | OUTPATIENT
Start: 2018-05-04 | End: 2018-05-25

## 2018-05-04 NOTE — TELEPHONE ENCOUNTER
"10/21/17 per PCP:\"Since you have had a change in your birth control pills it may work when it didn't in the past.  I would be comfortable with you being on this long term if needed.\"    Prescription approved per [facility] Refill Protocol. Filled until due for appointment. Last office visit 9/8/17  Aruna Huang RN  Message handled by Nurse Triage.    "

## 2018-05-25 ENCOUNTER — OFFICE VISIT (OUTPATIENT)
Dept: FAMILY MEDICINE | Facility: CLINIC | Age: 28
End: 2018-05-25
Payer: COMMERCIAL

## 2018-05-25 VITALS
HEIGHT: 70 IN | BODY MASS INDEX: 22.28 KG/M2 | OXYGEN SATURATION: 100 % | SYSTOLIC BLOOD PRESSURE: 110 MMHG | WEIGHT: 155.6 LBS | DIASTOLIC BLOOD PRESSURE: 70 MMHG | HEART RATE: 86 BPM | TEMPERATURE: 97.9 F

## 2018-05-25 DIAGNOSIS — M25.561 RIGHT KNEE PAIN, UNSPECIFIED CHRONICITY: ICD-10-CM

## 2018-05-25 DIAGNOSIS — Z23 NEED FOR TDAP VACCINATION: Primary | ICD-10-CM

## 2018-05-25 DIAGNOSIS — L70.0 ACNE VULGARIS: ICD-10-CM

## 2018-05-25 DIAGNOSIS — B00.9 HERPES SIMPLEX TYPE 2 INFECTION: ICD-10-CM

## 2018-05-25 DIAGNOSIS — Z30.41 ENCOUNTER FOR SURVEILLANCE OF CONTRACEPTIVE PILLS: ICD-10-CM

## 2018-05-25 PROCEDURE — 90715 TDAP VACCINE 7 YRS/> IM: CPT | Performed by: NURSE PRACTITIONER

## 2018-05-25 PROCEDURE — 99214 OFFICE O/P EST MOD 30 MIN: CPT | Performed by: NURSE PRACTITIONER

## 2018-05-25 RX ORDER — DOXYCYCLINE HYCLATE 100 MG
TABLET ORAL
Qty: 180 TABLET | Refills: 3 | Status: SHIPPED | OUTPATIENT
Start: 2018-05-25 | End: 2019-04-26

## 2018-05-25 RX ORDER — VALACYCLOVIR HYDROCHLORIDE 500 MG/1
500 TABLET, FILM COATED ORAL DAILY
Qty: 90 TABLET | Refills: 3 | Status: SHIPPED | OUTPATIENT
Start: 2018-05-25 | End: 2019-09-02

## 2018-05-25 RX ORDER — DROSPIRENONE AND ETHINYL ESTRADIOL 0.03MG-3MG
1 KIT ORAL DAILY
Qty: 84 TABLET | Refills: 3 | Status: SHIPPED | OUTPATIENT
Start: 2018-05-25 | End: 2019-04-26

## 2018-05-25 NOTE — PROGRESS NOTES
"  SUBJECTIVE:   Rach Peterson is a 27 year old female who presents to clinic today for the following health issues:      Medication Followup of All medication-- acne and OCP    Taking Medication as prescribed: yes    Side Effects:  None    Medication Helping Symptoms:  yes     Also:  Has right knee pain that is limiting her ability to exercise.  No known injury.  However, is finding it impossible to run as she has in the past.  Certain movements are quite painful.  Discussed options.  Will refer to sports medicine.    ROS:  C: NEGATIVE for fever, chills, change in weight  E/M: NEGATIVE for ear, mouth and throat problems  R: NEGATIVE for significant cough or SOB    OBJECTIVE:                                                    /70  Pulse 86  Temp 97.9  F (36.6  C) (Oral)  Ht 5' 9.5\" (1.765 m)  Wt 155 lb 9.6 oz (70.6 kg)  SpO2 100%  BMI 22.65 kg/m2 Body mass index is 22.65 kg/(m^2).   GENERAL: healthy, alert, well nourished, well hydrated, no distress  Skin:  Rare acneiform lesion, continues with some old post inflammatory changes.       ASSESSMENT/PLAN:                                                        ICD-10-CM    1. Need for Tdap vaccination Z23 TDAP VACCINE (ADACEL)   2. Herpes simplex type 2 infection B00.9 valACYclovir (VALTREX) 500 MG tablet   3. Encounter for surveillance of contraceptive pills Z30.41 drospirenone-ethinyl estradiol (CHARITY) 3-0.03 MG per tablet   4. Acne vulgaris L70.0 doxycycline (VIBRA-TABS) 100 MG tablet   5. Right knee pain, unspecified chronicity M25.561 SPORTS MEDICINE REFERRAL   Acne control is good.  Continue antibiotics,  Discussed consideration for weaning or other options as well.  Knee pain--could be patello femoral syndrome but with degree of disability will refer to sports medicine.    There are no Patient Instructions on file for this visit.           Estimated body mass index is 22.65 kg/(m^2) as calculated from the following:    Height as of this " "encounter: 5' 9.5\" (1.765 m).    Weight as of this encounter: 155 lb 9.6 oz (70.6 kg).        There are no Patient Instructions on file for this visit.          GUANAKO Damian [facility]    "

## 2018-05-25 NOTE — MR AVS SNAPSHOT
After Visit Summary   5/25/2018    Rach Peterson    MRN: [id number]           Patient Information     Date Of Birth          1990        Visit Information        Provider Department      5/25/2018 2:20 PM Claudine Garcia APRN CNP [facility]        Today's Diagnoses     Need for Tdap vaccination    -  1    Herpes simplex type 2 infection        Encounter for surveillance of contraceptive pills        Acne vulgaris        Right knee pain, unspecified chronicity           Follow-ups after your visit        Additional Services     SPORTS MEDICINE REFERRAL       Your provider has referred you to:  [facility]: [facility]  [phone]   [email or web address]    Please be aware that coverage of these services is subject to the terms and limitations of your health insurance plan.  Call member services at your health plan with any benefit or coverage questions.      Please bring the following to your appointment:    >>   Any x-rays, CTs or MRIs which have been performed.  Contact the facility where they were done to arrange for  prior to your scheduled appointment.    >>   List of current medications   >>   This referral request   >>   Any documents/labs given to you for this referral                  Follow-up notes from your care team     Return in about 1 year (around 5/25/2019) for Routine Visit.      Who to contact     If you have questions or need follow up information about today's clinic visit or your schedule please contact [facility] directly at [phone].  Normal or non-critical lab and imaging results will be communicated to you by MyChart, letter or phone within 4 business days after the clinic has received the results. If you do not hear from us within 7 days, please contact the clinic through MyChart or phone. If you  "have a critical or abnormal lab result, we will notify you by phone as soon as possible.  Submit refill requests through ShareGrove or call your pharmacy and they will forward the refill request to us. Please allow 3 business days for your refill to be completed.          Additional Information About Your Visit        ASOCShart Information     ShareGrove gives you secure access to your electronic health record. If you see a primary care provider, you can also send messages to your care team and make appointments. If you have questions, please call your primary care clinic.  If you do not have a primary care provider, please call [phone] and they will assist you.        Care EveryWhere ID     This is your Care EveryWhere ID. This could be used by other organizations to access your [facility] medical records  [id number]        Your Vitals Were     Pulse Temperature Height Pulse Oximetry BMI (Body Mass Index)       86 97.9  F (36.6  C) (Oral) 5' 9.5\" (1.765 m) 100% 22.65 kg/m2        Blood Pressure from Last 3 Encounters:   05/25/18 110/70   09/08/17 110/70   07/07/17 114/72    Weight from Last 3 Encounters:   05/25/18 155 lb 9.6 oz (70.6 kg)   09/08/17 158 lb (71.7 kg)   07/07/17 155 lb (70.3 kg)              We Performed the Following     SPORTS MEDICINE REFERRAL     TDAP VACCINE (ADACEL)          Where to get your medicines      [id number] [place], [place] - [address], [place] [address]     Phone:  [phone]     doxycycline 100 MG tablet    drospirenone-ethinyl estradiol 3-0.03 MG per tablet    valACYclovir 500 MG tablet          Primary Care Provider Office Phone # Fax #    GUANAKO Damian [phone] [address] [facility] [address]        Equal Access to Services     HINA SALDANA AH: Hadii kelly buttso Soomaali, waaxda luqadaha, qaybta kaalmada adeegyada, reena loomis " michele shoemakerharleenandres oroShireensandra ah. So [facility] [phone].    ATENCIÓN: Si feltonla carson, tiene a quiñones disposición servicios gratuitos de asistencia lingüística. Emmanuel al [phone].    We comply with applicable federal civil rights laws and [place] laws. We do not discriminate on the basis of race, color, national origin, age, disability, sex, sexual orientation, or gender identity.            Thank you!     Thank you for choosing [facility]  for your care. Our goal is always to provide you with excellent care. Hearing back from our patients is one way we can continue to improve our services. Please take a few minutes to complete the written survey that you may receive in the mail after your visit with us. Thank you!             Your Updated Medication List - Protect others around you: Learn how to safely use, store and throw away your medicines at www.disposemymeds.org.          This list is accurate as of 5/25/18  2:40 PM.  Always use your most recent med list.                   Brand Name Dispense Instructions for use Diagnosis    doxycycline 100 MG tablet    VIBRA-TABS    180 tablet    TAKE 1 TABLET(100 MG) BY MOUTH TWICE DAILY    Acne vulgaris       drospirenone-ethinyl estradiol 3-0.03 MG per tablet    CHARITY    84 tablet    Take 1 tablet by mouth daily    Encounter for surveillance of contraceptive pills       valACYclovir 500 MG tablet    VALTREX    90 tablet    Take 1 tablet (500 mg) by mouth daily    Herpes simplex type 2 infection

## 2018-06-04 ENCOUNTER — OFFICE VISIT (OUTPATIENT)
Dept: ORTHOPEDICS | Facility: CLINIC | Age: 28
End: 2018-06-04
Payer: COMMERCIAL

## 2018-06-04 VITALS
WEIGHT: 155 LBS | BODY MASS INDEX: 22.19 KG/M2 | SYSTOLIC BLOOD PRESSURE: 124 MMHG | DIASTOLIC BLOOD PRESSURE: 68 MMHG | HEIGHT: 70 IN

## 2018-06-04 DIAGNOSIS — M22.2X1 PATELLOFEMORAL PAIN SYNDROME OF RIGHT KNEE: ICD-10-CM

## 2018-06-04 DIAGNOSIS — M25.861 IMPINGEMENT SYNDROME INVOLVING PATELLAR FAT PAD OF RIGHT KNEE: Primary | ICD-10-CM

## 2018-06-04 PROCEDURE — 99243 OFF/OP CNSLTJ NEW/EST LOW 30: CPT | Performed by: FAMILY MEDICINE

## 2018-06-04 NOTE — MR AVS SNAPSHOT
After Visit Summary   6/4/2018    Rach Peterson    MRN: [id number]           Patient Information     Date Of Birth          1990        Visit Information        Provider Department      6/4/2018 8:00 AM Conor Harris MD [facility] SPORTS MEDICINE        Today's Diagnoses     Impingement syndrome involving patellar fat pad of right knee    -  1    Patellofemoral pain syndrome of right knee           Follow-ups after your visit        Additional Services     [facility] PT, HAND, AND CHIROPRACTIC REFERRAL       **This order will print in the [facility] Scheduling Office**    Physical Therapy, Hand Therapy and Chiropractic Care are available through:    *[facility]  *[facility]  *[facility]    Call one number to schedule at any of the above locations: [phone].    Your provider has referred you to: Physical Therapy at [facility] or [facility] with Gennaro Nuno    Indication/Reason for Referral:    Impingement syndrome involving patellar fat pad of right knee  Patellofemoral pain syndrome of right knee    Onset of Illness:   Therapy Orders: Evaluate and Treat  Special Programs: Running Injury  Special Request: consider fat pad unloading Adele taptomas.    Susy Jain      Additional Comments for the Therapist or Chiropractor:     Please be aware that coverage of these services is subject to the terms and limitations of your health insurance plan.  Call member services at your health plan with any benefit or coverage questions.      Please bring the following to your appointment:    *Your personal calendar for scheduling future appointments  *Comfortable clothing                  Who to contact     If you have questions or need follow up information about today's clinic visit or your schedule please contact [facility] SPORTS [facility] directly at [phone].  Normal or non-critical lab and imaging results will be  "communicated to you by MyChart, letter or phone within 4 business days after the clinic has received the results. If you do not hear from us within 7 days, please contact the clinic through [employer]den[employer] or phone. If you have a critical or abnormal lab result, we will notify you by phone as soon as possible.  Submit refill requests through [employer]denTrust or call your pharmacy and they will forward the refill request to us. Please allow 3 business days for your refill to be completed.          Additional Information About Your Visit        IdenTrust Information     [employer] gives you secure access to your electronic health record. If you see a primary care provider, you can also send messages to your care team and make appointments. If you have questions, please call your primary care clinic.  If you do not have a primary care provider, please call [phone] and they will assist you.        Care EveryWhere ID     This is your Care EveryWhere ID. This could be used by other organizations to access your [facility] medical records  [id number]        Your Vitals Were     Height BMI (Body Mass Index)                5' 9.5\" (1.765 m) 22.56 kg/m2           Blood Pressure from Last 3 Encounters:   06/04/18 124/68   05/25/18 110/70   09/08/17 110/70    Weight from Last 3 Encounters:   06/04/18 155 lb (70.3 kg)   05/25/18 155 lb 9.6 oz (70.6 kg)   09/08/17 158 lb (71.7 kg)              We Performed the Following     [facility] PT, HAND, AND CHIROPRACTIC REFERRAL        Primary Care Provider Office Phone # Fax #    GUANAKO Damian [phone] [address] [facility] [address]        Equal Access to Services     LESLI SALDANA : Hadii aad ku hadasho Sololaali, waaxda luqadaha, qaybta kaalmada adeegyada, reena quiroga . So [facility] [phone].    ATENCIÓN: Si habla español, tiene a quiñones disposición servicios gratuitos de asistencia lingüística. Llame al [phone].    We comply with " applicable federal civil rights laws and [place] laws. We do not discriminate on the basis of race, color, national origin, age, disability, sex, sexual orientation, or gender identity.            Thank you!     Thank you for choosing [facility]  for your care. Our goal is always to provide you with excellent care. Hearing back from our patients is one way we can continue to improve our services. Please take a few minutes to complete the written survey that you may receive in the mail after your visit with us. Thank you!             Your Updated Medication List - Protect others around you: Learn how to safely use, store and throw away your medicines at www.disposemymeds.org.          This list is accurate as of 6/4/18  4:04 PM.  Always use your most recent med list.                   Brand Name Dispense Instructions for use Diagnosis    doxycycline 100 MG tablet    VIBRA-TABS    180 tablet    TAKE 1 TABLET(100 MG) BY MOUTH TWICE DAILY    Acne vulgaris       drospirenone-ethinyl estradiol 3-0.03 MG per tablet    CHARITY    84 tablet    Take 1 tablet by mouth daily    Encounter for surveillance of contraceptive pills       valACYclovir 500 MG tablet    VALTREX    90 tablet    Take 1 tablet (500 mg) by mouth daily    Herpes simplex type 2 infection

## 2018-06-04 NOTE — LETTER
6/4/2018         RE: Rach Peterson  [address]  [address]  [place] [address]        Dear Colleague,    Thank you for referring your patient, Rach Peterson, to the [facility] SPORTS MEDICINE. Please see a copy of my visit note below.    [facility]   Clinic Visit s Jun 4, 2018    PCP: Claudine Garcia      Rach is a 27 year old female who is seen in consultation at the request of Dr. Garcia for   Chief Complaint   Patient presents with     Knee Pain       Injury: Reports insidious onset without acute precipitating event.       Location of Pain: right knee anterior , nonradiating   Duration of Pain: 7-8 month(s)  Rating of Pain at worst: 6/10  Rating of Pain Currently: 0/10  Pain is better with: activity avoidance   Pain is worse with: squats, lunges and running   Treatment so far consists of: no treatment tried to date  Associated symptoms: no distal numbness or tingling; denies swelling or warmth  Recent imaging completed: No recent imaging completed.  Prior History of related problems: none    Social History: is employed as a/an  with desk time at work 8 hrs/day     Past Medical History:   Diagnosis Date     Syncope and collapse 2006    Cardiology workup with Dr Brady, wandering atrial pacer, no activity restritction and no need for abx prophylaxis (mild pulm flow murmur)       Patient Active Problem List    Diagnosis Date Noted     HSV (herpes simplex virus) infection 05/25/2018     Priority: Medium     Other acne 11/14/2007     Priority: Medium       Family History   Problem Relation Age of Onset     CANCER Maternal Aunt      Melanoma       Social History     Social History     Marital status:      Spouse name: N/A     Number of children: N/A     Years of education: N/A     Social History Main Topics     Smoking status: Never Smoker     Smokeless tobacco: Never Used      Comment: Non smoking home     Alcohol use Yes     Past Surgical  "History:   Procedure Laterality Date     HC REMOVE TONSILS/ADENOIDS,<14 Y/O      T & A <13y.o.         Review of Systems   Musculoskeletal: Positive for joint pain.   All other systems reviewed and are negative.        Physical Exam  /68  Ht 5' 9.5\" (1.765 m)  Wt 155 lb (70.3 kg)  BMI 22.56 kg/m2  Constitutional:well-developed, well-nourished, and in no distress.   Cardiovascular: Intact distal pulses.    Neurological: alert. Gait Normal:   Gait, station, stance, and balance appear normal for age  Skin: Skin is warm and dry.   Psychiatric: Mood and affect normal.   Respiratory: unlabored, speaks in full sentences  Lymph: no LAD, no lymphangitis          Left Knee Exam   Swelling: None  Effusion: No    Tenderness   None    Range of Motion   Extension: Normal  Flexion:     Normal    Tests   McMurrays:  Medial - Negative      Lateral - Negative  Lachman:  Anterior - Negative    Posterior - Negative  Drawer:       Anterior - Negative     Posterior - Negative  Varus:  Negative  Valgus: Negative  Pivot Shift: Negative  Patellar Apprehension: No    Right Knee Exam   Swelling: None  Effusion: No    Tenderness   The patient is experiencing tenderness in the lateral aspect of infrapatellar fat pad.    Range of Motion   Extension: Normal  Flexion:     Normal    Tests   McMurrays:  Medial - Negative      Lateral - Negative  Lachman:  Anterior - Negative    Posterior - Negative  Drawer:       Anterior - Negative    Posterior - Negative  Varus:  Negative  Valgus: Negative  Pivot Shift: Negative  Patellar Apprehension: No    Comments:  Fat pad tenderness to palpation  Only with knee flexion    Mild VMO atrophy compared to LEFT knee        ASSESSMENT/PLAN    ICD-10-CM    1. Impingement syndrome involving patellar fat pad of right knee M25.861 [facility], HAND, AND CHIROPRACTIC REFERRAL   2. Patellofemoral pain syndrome of right knee M22.2X1 [facility] PT, HAND, AND CHIROPRACTIC REFERRAL     Nature of fat pad impingement related to " altered patellofemoral mechanics discussed.  Recommended physical therapy where they could apply Angulo taping for fat pad unloading in addition to assess biomechanics.  Patient eager to proceed, referral to running therapy at [facility] ordered.    Again, thank you for allowing me to participate in the care of your patient.        Sincerely,        Conor Harris MD

## 2018-06-04 NOTE — PROGRESS NOTES
"[facility]   Clinic Visit s Jun 4, 2018    PCP: Claudine Garcia      Rach is a 27 year old female who is seen in consultation at the request of Dr. Garcia for   Chief Complaint   Patient presents with     Knee Pain       Injury: Reports insidious onset without acute precipitating event.       Location of Pain: right knee anterior , nonradiating   Duration of Pain: 7-8 month(s)  Rating of Pain at worst: 6/10  Rating of Pain Currently: 0/10  Pain is better with: activity avoidance   Pain is worse with: squats, lunges and running   Treatment so far consists of: no treatment tried to date  Associated symptoms: no distal numbness or tingling; denies swelling or warmth  Recent imaging completed: No recent imaging completed.  Prior History of related problems: none    Social History: is employed as a/an  with desk time at work 8 hrs/day     Past Medical History:   Diagnosis Date     Syncope and collapse 2006    Cardiology workup with Dr Brady, wandering atrial pacer, no activity restritction and no need for abx prophylaxis (mild pulm flow murmur)       Patient Active Problem List    Diagnosis Date Noted     HSV (herpes simplex virus) infection 05/25/2018     Priority: Medium     Other acne 11/14/2007     Priority: Medium       Family History   Problem Relation Age of Onset     CANCER Maternal Aunt      Melanoma       Social History     Social History     Marital status:      Spouse name: N/A     Number of children: N/A     Years of education: N/A     Social History Main Topics     Smoking status: Never Smoker     Smokeless tobacco: Never Used      Comment: Non smoking home     Alcohol use Yes     Past Surgical History:   Procedure Laterality Date     HC REMOVE TONSILS/ADENOIDS,<14 Y/O      T & A <13y.o.         Review of Systems   Musculoskeletal: Positive for joint pain.   All other systems reviewed and are negative.        Physical Exam  /68  Ht 5' 9.5\" (1.765 m)  " Wt 155 lb (70.3 kg)  BMI 22.56 kg/m2  Constitutional:well-developed, well-nourished, and in no distress.   Cardiovascular: Intact distal pulses.    Neurological: alert. Gait Normal:   Gait, station, stance, and balance appear normal for age  Skin: Skin is warm and dry.   Psychiatric: Mood and affect normal.   Respiratory: unlabored, speaks in full sentences  Lymph: no LAD, no lymphangitis          Left Knee Exam   Swelling: None  Effusion: No    Tenderness   None    Range of Motion   Extension: Normal  Flexion:     Normal    Tests   McMurrays:  Medial - Negative      Lateral - Negative  Lachman:  Anterior - Negative    Posterior - Negative  Drawer:       Anterior - Negative     Posterior - Negative  Varus:  Negative  Valgus: Negative  Pivot Shift: Negative  Patellar Apprehension: No    Right Knee Exam   Swelling: None  Effusion: No    Tenderness   The patient is experiencing tenderness in the lateral aspect of infrapatellar fat pad.    Range of Motion   Extension: Normal  Flexion:     Normal    Tests   McMurrays:  Medial - Negative      Lateral - Negative  Lachman:  Anterior - Negative    Posterior - Negative  Drawer:       Anterior - Negative    Posterior - Negative  Varus:  Negative  Valgus: Negative  Pivot Shift: Negative  Patellar Apprehension: No    Comments:  Fat pad tenderness to palpation  Only with knee flexion    Mild VMO atrophy compared to LEFT knee        ASSESSMENT/PLAN    ICD-10-CM    1. Impingement syndrome involving patellar fat pad of right knee M25.861 [facility] PT, HAND, AND CHIROPRACTIC REFERRAL   2. Patellofemoral pain syndrome of right knee M22.2X1 [facility] PT, HAND, AND CHIROPRACTIC REFERRAL     Nature of fat pad impingement related to altered patellofemoral mechanics discussed.  Recommended physical therapy where they could apply Angulo taping for fat pad unloading in addition to assess biomechanics.  Patient eager to proceed, referral to running therapy at [facility] ordered.

## 2018-06-26 ENCOUNTER — THERAPY VISIT (OUTPATIENT)
Dept: PHYSICAL THERAPY | Facility: CLINIC | Age: 28
End: 2018-06-26
Attending: FAMILY MEDICINE
Payer: COMMERCIAL

## 2018-06-26 DIAGNOSIS — M25.561 CHRONIC PAIN OF RIGHT KNEE: Primary | ICD-10-CM

## 2018-06-26 DIAGNOSIS — G89.29 CHRONIC PAIN OF RIGHT KNEE: Primary | ICD-10-CM

## 2018-06-26 PROCEDURE — 97110 THERAPEUTIC EXERCISES: CPT | Mod: GP | Performed by: PHYSICAL THERAPIST

## 2018-06-26 PROCEDURE — 97161 PT EVAL LOW COMPLEX 20 MIN: CPT | Mod: GP | Performed by: PHYSICAL THERAPIST

## 2018-06-26 PROCEDURE — 97112 NEUROMUSCULAR REEDUCATION: CPT | Mod: GP | Performed by: PHYSICAL THERAPIST

## 2018-06-26 ASSESSMENT — ACTIVITIES OF DAILY LIVING (ADL)
HOW_WOULD_YOU_RATE_THE_CURRENT_FUNCTION_OF_YOUR_KNEE_DURING_YOUR_USUAL_DAILY_ACTIVITIES_ON_A_SCALE_FROM_0_TO_100_WITH_100_BEING_YOUR_LEVEL_OF_KNEE_FUNCTION_PRIOR_TO_YOUR_INJURY_AND_0_BEING_THE_INABILITY_TO_PERFORM_ANY_OF_YOUR_USUAL_DAILY_ACTIVITIES?: 90
KNEEL ON THE FRONT OF YOUR KNEE: ACTIVITY IS NOT DIFFICULT
KNEE_ACTIVITY_OF_DAILY_LIVING_SCORE: 84.29
RISE FROM A CHAIR: ACTIVITY IS MINIMALLY DIFFICULT
GIVING WAY, BUCKLING OR SHIFTING OF KNEE: I DO NOT HAVE THE SYMPTOM
LIMPING: I DO NOT HAVE THE SYMPTOM
KNEE_ACTIVITY_OF_DAILY_LIVING_SUM: 59
SWELLING: I DO NOT HAVE THE SYMPTOM
WALK: ACTIVITY IS NOT DIFFICULT
GO UP STAIRS: ACTIVITY IS MINIMALLY DIFFICULT
STAND: ACTIVITY IS NOT DIFFICULT
AS_A_RESULT_OF_YOUR_KNEE_INJURY,_HOW_WOULD_YOU_RATE_YOUR_CURRENT_LEVEL_OF_DAILY_ACTIVITY?: NEARLY NORMAL
WEAKNESS: I DO NOT HAVE THE SYMPTOM
PAIN: THE SYMPTOM AFFECTS MY ACTIVITY SLIGHTLY
RAW_SCORE: 59
HOW_WOULD_YOU_RATE_THE_OVERALL_FUNCTION_OF_YOUR_KNEE_DURING_YOUR_USUAL_DAILY_ACTIVITIES?: NEARLY NORMAL
STIFFNESS: THE SYMPTOM AFFECTS MY ACTIVITY SLIGHTLY
SQUAT: ACTIVITY IS VERY DIFFICULT
SIT WITH YOUR KNEE BENT: ACTIVITY IS NOT DIFFICULT
GO DOWN STAIRS: ACTIVITY IS MINIMALLY DIFFICULT

## 2018-06-26 NOTE — PROGRESS NOTES
[facility] Initial Evaluation  Subjective:  Patient is a 27 year old female presenting with rehab right knee hpi.   Rach Peterson is a 27 year old female with a right knee condition.  Condition occurred with:  Repetition/overuse.  Condition occurred: during recreation/sport.  This is a chronic condition  Nov. 2017. Patient started having R knee pain with performing strength training class in Nov. 2017. The exercise class involved repetitive squatting and lunging. Patient also runs 1-2 miles 4x/week which does not increase her R knee pain..    Patient reports pain:  Anterior and sub patellar.    Pain is described as sharp and is intermittent and reported as 6/10.   Pain is worse during the day.  Symptoms are exacerbated by bending/squatting and other (lunges) and relieved by other (avoiding squatting and lunging).  Since onset symptoms are unchanged.        General health as reported by patient is excellent.  Pertinent medical history includes:  None.  Medical allergies: no.  Other surgeries include:  None reported.  Current medications:  Other (acne medication).  Current occupation is .  Patient is currently not working due to present treatment problem.  Primary job tasks include:  Prolonged sitting and other (computer work).    Barriers include:  None as reported by the patient.    Red flags:  None as reported by the patient.                        Objective:  Standing Alignment:              Knee:  Patella squinting R  Ankle/Foot:  Pes planus L and pes planus R    Gait:    Gait Type:  Normal   Assistive Devices:  None  Deviations:  Knee:  Poor patellar tracking RGeneral Deviations:  Toe in L                                                      Knee Evaluation:  ROM:  AROM: normal            Strength:         Quad Set Left:  WNL    Pain: -   Quad Set Right:  WNL    Pain: +    Special Tests:     Left knee negative for the following special tests:  Meninscal and IT Band  Friction  Right knee positive for the following tests:  IT Band Friction  Right knee negative for the following special tests:  Meniscal    Edema:  Edema of the knee: Mild edema R knee.      Functional Testing:          Quad:    Single Leg Squat:  Left:       Right:        Bilateral Leg Squat:  R knee pain  Mild loss of control and excessive anterior knee excursion      Proprioception:   Stork Balance Test:  Left:  Normal control, no pain  Right:  Mild loss of control, no pain  % of Uninvolved:           General     ROS    Assessment/Plan:    Patient is a 27 year old female with right side knee complaints.    Patient has the following significant findings with corresponding treatment plan.                Diagnosis 1: Impingement syndrome involving patellar fat pad of R knee, Patellofemoral pain syndrome of R knee  Pain -  hot/cold therapy, manual therapy, splint/taping/bracing/orthotics, self management, education and home program  Decreased strength - therapeutic exercise, therapeutic activities and home program  Decreased proprioception - neuro re-education, therapeutic activities and home program  Edema - cold therapy and self management/home program  Impaired muscle performance - neuro re-education and home program  Decreased function - therapeutic activities and home program  Impaired posture - neuro re-education and home program    Therapy Evaluation Codes:   1) History comprised of:   Personal factors that impact the plan of care:      Time since onset of symptoms.    Comorbidity factors that impact the plan of care are:      None.     Medications impacting care: None.  2) Examination of Body Systems comprised of:   Body structures and functions that impact the plan of care:      Knee.   Activity limitations that impact the plan of care are:      Squatting/kneeling.  3) Clinical presentation characteristics are:   Stable/Uncomplicated.  4) Decision-Making    Low complexity using standardized patient  assessment instrument and/or measureable assessment of functional outcome.  Cumulative Therapy Evaluation is: Low complexity.    Previous and current functional limitations:  (See Goal Flow Sheet for this information)    Short term and Long term goals: (See Goal Flow Sheet for this information)     Communication ability:  Patient appears to be able to clearly communicate and understand verbal and written communication and follow directions correctly.  Treatment Explanation - The following has been discussed with the patient:   RX ordered/plan of care  Anticipated outcomes  Possible risks and side effects  This patient would benefit from PT intervention to resume normal activities.   Rehab potential is good.    Frequency:  1 X week, once daily  Duration:  for 4 weeks tapering to 1 X every other week over 4 weeks  Discharge Plan:  Achieve all LTG.  Independent in home treatment program.  Reach maximal therapeutic benefit.    Please refer to the daily flowsheet for treatment today, total treatment time and time spent performing 1:1 timed codes.

## 2018-06-26 NOTE — MR AVS SNAPSHOT
After Visit Summary   6/26/2018    Rach Peterson    MRN: [id number]           Patient Information     Date Of Birth          1990        Visit Information        Provider Department      6/26/2018 4:40 PM Gennaro Nuno, PT [facility]        Today's Diagnoses     Chronic pain of right knee    -  1       Follow-ups after your visit        Your next 10 appointments already scheduled     Jul 11, 2018  3:20 PM CDT   GAURAV Running with Gennaro Nuno PT   [facility] Physical Therapy ([facility]  )    [address] [place] [id number] [phone]            Jul 17, 2018  4:40 PM CDT   GAURAV Running with Gennaro Nuno PT   [facility] Therapy ([facility]  )    [address] [place] [id number] [phone]              Who to contact     If you have questions or need follow up information about today's clinic visit or your schedule please [facility] directly at [phone].  Normal or non-critical lab and imaging results will be communicated to you by MyChart, letter or phone within 4 business days after the clinic has received the results. If you do not hear from us within 7 days, please contact the clinic through Mindset Studiohart or phone. If you have a critical or abnormal lab result, we will notify you by phone as soon as possible.  Submit refill requests through Vaximm or call your pharmacy and they will forward the refill request to us. Please allow 3 business days for your refill to be completed.          Additional Information About Your Visit        MyChart Information     Vaximm gives you secure access to your electronic health record. If you see a primary care provider, you can also send messages to your care team and make appointments. If you have questions,  please call your primary care clinic.  If you do not have a primary care provider, please call [phone] and they will assist you.        Care EveryWhere ID     This is your Care EveryWhere ID. This could be used by other organizations to access your [place] medical records  [id number]         Blood Pressure from Last 3 Encounters:   06/04/18 124/68   05/25/18 110/70   09/08/17 110/70    Weight from Last 3 Encounters:   06/04/18 70.3 kg (155 lb)   05/25/18 70.6 kg (155 lb 9.6 oz)   09/08/17 71.7 kg (158 lb)              We Performed the Following     GAURAV Inital Eval Report     Neuromuscular Re-Education     PT Yayo, Low Complexity ([id number])     Therapeutic Exercises        Primary Care Provider Office Phone # Fax #    GUANAKO Damian [phone] [address] [address]        Equal Access to Services     HINA SALDANA : Hadii aad ku hadasho Soomaali, waaxda luqadaha, qaybta kaalmada adeegyada, waxay idiin hayaan mitali quiroga . So [facility] [phone].    ATENCIÓN: Si sushant byrd, tiene a quiñones disposición servicios gratuitos de asistencia lingüística. Llame al [phone].    We comply with applicable federal civil rights laws and [place] laws. We do not discriminate on the basis of race, color, national origin, age, disability, sex, sexual orientation, or gender identity.            Thank you!     Thank you for choosing [facility] PH[facility]ICAL THERAP[facility]  for your care. Our goal is always to provide you with excellent care. Hearing back from our patients is one way we can continue to improve our services. Please take a few minutes to complete the written survey that you may receive in the mail after your visit with us. Thank you!             Your Updated Medication List - Protect others around you: Learn how to safely use, store and throw away your medicines at www.disposemymeds.org.          This list is accurate as of 6/26/18  11:59 PM.  Always use your most recent med list.                   Brand Name Dispense Instructions for use Diagnosis    doxycycline 100 MG tablet    VIBRA-TABS    180 tablet    TAKE 1 TABLET(100 MG) BY MOUTH TWICE DAILY    Acne vulgaris       drospirenone-ethinyl estradiol 3-0.03 MG per tablet    CHARITY    84 tablet    Take 1 tablet by mouth daily    Encounter for surveillance of contraceptive pills       valACYclovir 500 MG tablet    VALTREX    90 tablet    Take 1 tablet (500 mg) by mouth daily    Herpes simplex type 2 infection

## 2018-06-28 PROBLEM — G89.29 CHRONIC PAIN OF RIGHT KNEE: Status: ACTIVE | Noted: 2018-06-28

## 2018-06-28 PROBLEM — M25.561 CHRONIC PAIN OF RIGHT KNEE: Status: ACTIVE | Noted: 2018-06-28

## 2018-07-11 ENCOUNTER — THERAPY VISIT (OUTPATIENT)
Dept: PHYSICAL THERAPY | Facility: CLINIC | Age: 28
End: 2018-07-11
Payer: COMMERCIAL

## 2018-07-11 DIAGNOSIS — G89.29 CHRONIC PAIN OF RIGHT KNEE: ICD-10-CM

## 2018-07-11 DIAGNOSIS — M25.561 CHRONIC PAIN OF RIGHT KNEE: ICD-10-CM

## 2018-07-11 PROCEDURE — 97110 THERAPEUTIC EXERCISES: CPT | Mod: GP | Performed by: PHYSICAL THERAPIST

## 2018-07-11 PROCEDURE — 97112 NEUROMUSCULAR REEDUCATION: CPT | Mod: GP | Performed by: PHYSICAL THERAPIST

## 2018-09-07 ENCOUNTER — THERAPY VISIT (OUTPATIENT)
Dept: PHYSICAL THERAPY | Facility: CLINIC | Age: 28
End: 2018-09-07
Payer: COMMERCIAL

## 2018-09-07 DIAGNOSIS — M25.561 CHRONIC PAIN OF RIGHT KNEE: ICD-10-CM

## 2018-09-07 DIAGNOSIS — G89.29 CHRONIC PAIN OF RIGHT KNEE: ICD-10-CM

## 2018-09-07 PROCEDURE — 97112 NEUROMUSCULAR REEDUCATION: CPT | Mod: GP | Performed by: PHYSICAL THERAPIST

## 2018-09-07 PROCEDURE — 97110 THERAPEUTIC EXERCISES: CPT | Mod: GP | Performed by: PHYSICAL THERAPIST

## 2018-09-07 NOTE — PROGRESS NOTES
Subjective:  HPI                    Objective:  System                                                Knee Evaluation:          Edema:  Normal      Functional Testing:        Core Strength:  Prone Plank:  60 sec/60 sec    Quad:    Single Leg Squat:  Left:         No pain  Moderate loss of control, femoral IR and excessive anterior knee excursion  Right:      No pain  Moderate loss of control, femoral IR and excessive anterior knee excursion  Bilateral Leg Squat:  No pain  Normal control              General     ROS    Assessment/Plan:    PROGRESS  REPORT    Progress reporting period is from 6/26/18 to 9/7/18.       SUBJECTIVE  Subjective changes noted by patient:  R knee feels better overall. Her R knee achiness with running is less severe and resolves more quickly. She is running 4x/week for 2 miles.       Current Pain level: 3/10.     Initial Pain level: 6/10.   Changes in function:  Yes (See Goal flowsheet attached for changes in current functional level)  Adverse reaction to treatment or activity: None    OBJECTIVE  Changes noted in objective findings:  Yes, see objective findings.    ASSESSMENT/PLAN  Updated problem list and treatment plan: Diagnosis 1: Impingement syndrome involving patellar fat pad of R knee, Patellofemoral pain syndrome of R knee  Pain -  hot/cold therapy, manual therapy, splint/taping/bracing/orthotics, self management, education and home program  Decreased strength - therapeutic exercise, therapeutic activities and home program  Decreased proprioception - neuro re-education, therapeutic activities and home program  Edema - cold therapy and self management/home program  Impaired muscle performance - neuro re-education and home program  Decreased function - therapeutic activities and home program  Impaired posture - neuro re-education and home program  STG/LTGs have been met or progress has been made towards goals:  Yes (See Goal flow sheet completed today.)  Assessment of Progress: The  patient's condition is improving.  Self Management Plans:  Patient has been instructed in a home treatment program.  Patient  has been instructed in self management of symptoms.  I have re-evaluated this patient and find that the nature, scope, duration and intensity of the therapy is appropriate for the medical condition of the patient.  Rach continues to require the following intervention to meet STG and LTG's:  PT    Recommendations:  This patient would benefit from continued therapy.     Frequency:  1 X every other week, once daily  Duration:  for 6 weeks        Please refer to the daily flowsheet for treatment today, total treatment time and time spent performing 1:1 timed codes.

## 2018-09-07 NOTE — MR AVS SNAPSHOT
After Visit Summary   9/7/2018    Rach Peterson    MRN: [id number]           Patient Information     Date Of Birth          1990        Visit Information        Provider Department      9/7/2018 4:40 PM Gennaro Nuno, PT [facility] Physical Therapy        Today's Diagnoses     Chronic pain of right knee           Follow-ups after your visit        Your next 10 appointments already scheduled     Sep 21, 2018 10:50 AM CDT   GAURAV Running with Gennaro Nuno PT   [facility]    [address] [address] [phone]              Who to contact     If you have questions or need follow up information about today's clinic visit or your schedule please contact [facility] PHYSICAL THERAPY directly at [phone].  Normal or non-critical lab and imaging results will be communicated to you by Weelehart, letter or phone within 4 business days after the clinic has received the results. If you do not hear from us within 7 days, please contact the clinic through Weelehart or phone. If you have a critical or abnormal lab result, we will notify you by phone as soon as possible.  Submit refill requests through Emtrics or call your pharmacy and they will forward the refill request to us. Please allow 3 business days for your refill to be completed.          Additional Information About Your Visit        MyChart Information     Emtrics gives you secure access to your electronic health record. If you see a primary care provider, you can also send messages to your care team and make appointments. If you have questions, please call your primary care clinic.  If you do not have a primary care provider, please call [phone] and they will assist you.        Care EveryWhere ID     This is your Care EveryWhere ID. This could be used by other  organizations to access your [place] medical records  [id number]         Blood Pressure from Last 3 Encounters:   06/04/18 124/68   05/25/18 110/70   09/08/17 110/70    Weight from Last 3 Encounters:   06/04/18 70.3 kg (155 lb)   05/25/18 70.6 kg (155 lb 9.6 oz)   09/08/17 71.7 kg (158 lb)              We Performed the Following     GAURAV Progress Notes Report     Neuromuscular Re-Education     Therapeutic Exercises        Primary Care Provider Office Phone # Fax #    Claudine Garcia, APRN [phone] [address] [address]        Equal Access to Services     HINA SALDANA : Hadii aad ku hadasho Solinda, waaxda luqadaha, qaybta kaalmada adeegyada, reena quiroga . So [facility] [phone].    ATENCIÓN: Si habla español, tiene a quiñones disposición servicios gratuitos de asistencia lingüística. Llame al [phone].    We comply with applicable federal civil rights laws and [place] laws. We do not discriminate on the basis of race, color, national origin, age, disability, sex, sexual orientation, or gender identity.            Thank you!     Thank you for choosing [facility] PHYSICAL THERAPY  for your care. Our goal is always to provide you with excellent care. Hearing back from our patients is one way we can continue to improve our services. Please take a few minutes to complete the written survey that you may receive in the mail after your visit with us. Thank you!             Your Updated Medication List - Protect others around you: Learn how to safely use, store and throw away your medicines at www.disposemymeds.org.          This list is accurate as of 9/7/18 11:59 PM.  Always use your most recent med list.                   Brand Name Dispense Instructions for use Diagnosis    doxycycline 100 MG tablet    VIBRA-TABS    180 tablet    TAKE 1 TABLET(100 MG) BY MOUTH TWICE DAILY    Acne vulgaris        drospirenone-ethinyl estradiol 3-0.03 MG per tablet    CHARITY    84 tablet    Take 1 tablet by mouth daily    Encounter for surveillance of contraceptive pills       valACYclovir 500 MG tablet    VALTREX    90 tablet    Take 1 tablet (500 mg) by mouth daily    Herpes simplex type 2 infection

## 2018-09-21 ENCOUNTER — THERAPY VISIT (OUTPATIENT)
Dept: PHYSICAL THERAPY | Facility: CLINIC | Age: 28
End: 2018-09-21
Payer: COMMERCIAL

## 2018-09-21 DIAGNOSIS — M25.561 CHRONIC PAIN OF RIGHT KNEE: ICD-10-CM

## 2018-09-21 DIAGNOSIS — G89.29 CHRONIC PAIN OF RIGHT KNEE: ICD-10-CM

## 2018-09-21 PROCEDURE — 97112 NEUROMUSCULAR REEDUCATION: CPT | Mod: GP | Performed by: PHYSICAL THERAPIST

## 2018-09-21 PROCEDURE — 97110 THERAPEUTIC EXERCISES: CPT | Mod: GP | Performed by: PHYSICAL THERAPIST

## 2018-09-21 ASSESSMENT — ACTIVITIES OF DAILY LIVING (ADL)
RAW_SCORE: 64
SWELLING: I DO NOT HAVE THE SYMPTOM
LIMPING: I DO NOT HAVE THE SYMPTOM
AS_A_RESULT_OF_YOUR_KNEE_INJURY,_HOW_WOULD_YOU_RATE_YOUR_CURRENT_LEVEL_OF_DAILY_ACTIVITY?: NEARLY NORMAL
WALK: ACTIVITY IS NOT DIFFICULT
STAND: ACTIVITY IS NOT DIFFICULT
PAIN: I HAVE THE SYMPTOM BUT IT DOES NOT AFFECT MY ACTIVITY
KNEEL ON THE FRONT OF YOUR KNEE: ACTIVITY IS NOT DIFFICULT
KNEE_ACTIVITY_OF_DAILY_LIVING_SUM: 64
GIVING WAY, BUCKLING OR SHIFTING OF KNEE: I DO NOT HAVE THE SYMPTOM
RISE FROM A CHAIR: ACTIVITY IS NOT DIFFICULT
SIT WITH YOUR KNEE BENT: ACTIVITY IS MINIMALLY DIFFICULT
GO DOWN STAIRS: ACTIVITY IS NOT DIFFICULT
HOW_WOULD_YOU_RATE_THE_CURRENT_FUNCTION_OF_YOUR_KNEE_DURING_YOUR_USUAL_DAILY_ACTIVITIES_ON_A_SCALE_FROM_0_TO_100_WITH_100_BEING_YOUR_LEVEL_OF_KNEE_FUNCTION_PRIOR_TO_YOUR_INJURY_AND_0_BEING_THE_INABILITY_TO_PERFORM_ANY_OF_YOUR_USUAL_DAILY_ACTIVITIES?: 80
KNEE_ACTIVITY_OF_DAILY_LIVING_SCORE: 91.43
GO UP STAIRS: ACTIVITY IS NOT DIFFICULT
WEAKNESS: I DO NOT HAVE THE SYMPTOM
STIFFNESS: THE SYMPTOM AFFECTS MY ACTIVITY SLIGHTLY
SQUAT: ACTIVITY IS SOMEWHAT DIFFICULT
HOW_WOULD_YOU_RATE_THE_OVERALL_FUNCTION_OF_YOUR_KNEE_DURING_YOUR_USUAL_DAILY_ACTIVITIES?: NEARLY NORMAL

## 2018-09-21 NOTE — MR AVS SNAPSHOT
After Visit Summary   9/21/2018    Rach Peterson    MRN: [id number]           Patient Information     Date Of Birth          1990        Visit Information        Provider Department      9/21/2018 10:50 AM Gennaro Nuno PT [facility] Physical [facility]        Today's Diagnoses     Chronic pain of right knee           Follow-ups after your visit        Who to contact     If you have questions or need follow up information about today's clinic visit or your schedule please contact [facility] PHYSICAL THERAPY directly at [phone].  Normal or non-critical lab and imaging results will be communicated to you by 100e.comhart, letter or phone within 4 business days after the clinic has received the results. If you do not hear from us within 7 days, please contact the clinic through [employer] or phone. If you have a critical or abnormal lab result, we will notify you by phone as soon as possible.  Submit refill requests through Guided [employer] Systems or call your pharmacy and they will forward the refill request to us. Please allow 3 business days for your refill to be completed.          Additional Information About Your Visit        MyChart Information     Gu[employer] Systems gives you secure access to your electronic health record. If you see a primary care provider, you can also send messages to your care team and make appointments. If you have questions, please call your primary care clinic.  If you do not have a primary care provider, please call [phone] and they will assist you.        Care EveryWhere ID     This is your Care EveryWhere ID. This could be used by other organizations to access your [place] medical records  [id number]         Blood Pressure from Last 3 Encounters:   06/04/18 124/68   05/25/18 110/70   09/08/17 110/70    Weight from Last 3 Encounters:   06/04/18 70.3 kg (155 lb)   05/25/18 70.6 kg (155 lb 9.6 oz)   09/08/17  71.7 kg (158 lb)              We Performed the Following     Neuromuscular Re-Education     Therapeutic Exercises        Primary Care Provider Office Phone # Fax #    GUANAKO Damian [phone] [address] [id number]        Equal Access to Services     LUANNELESLI SHANA : Hadii aad ku hadasho Soomaali, waaxda luqadaha, qaybta kaalmada adeegyada, waxay idiin hayaan adeeg heather lagerman . So [facility] [phone].    ATENCIÓN: Si habla español, tiene a quiñones disposición servicios gratuitos de asistencia lingüística. Llame al [phone].    We comply with applicable federal civil rights laws and [place] laws. We do not discriminate on the basis of race, color, national origin, age, disability, sex, sexual orientation, or gender identity.            Thank you!     Thank you for choosing [place] [facility] PHYSICAL THERAPY  for your care. Our goal is always to provide you with excellent care. Hearing back from our patients is one way we can continue to improve our services. Please take a few minutes to complete the written survey that you may receive in the mail after your visit with us. Thank you!             Your Updated Medication List - Protect others around you: Learn how to safely use, store and throw away your medicines at www.disposemymeds.org.          This list is accurate as of 9/21/18 11:36 AM.  Always use your most recent med list.                   Brand Name Dispense Instructions for use Diagnosis    doxycycline 100 MG tablet    VIBRA-TABS    180 tablet    TAKE 1 TABLET(100 MG) BY MOUTH TWICE DAILY    Acne vulgaris       drospirenone-ethinyl estradiol 3-0.03 MG per tablet    CHARITY    84 tablet    Take 1 tablet by mouth daily    Encounter for surveillance of contraceptive pills       valACYclovir 500 MG tablet    VALTREX    90 tablet    Take 1 tablet (500 mg) by mouth daily    Herpes simplex type 2 infection

## 2019-04-23 ASSESSMENT — ENCOUNTER SYMPTOMS
JOINT SWELLING: 0
CONSTIPATION: 0
DYSURIA: 0
FEVER: 0
CHILLS: 0
HEADACHES: 0
ABDOMINAL PAIN: 0
EYE PAIN: 0
SORE THROAT: 0
PALPITATIONS: 0
NERVOUS/ANXIOUS: 0
DIZZINESS: 0
COUGH: 0
BREAST MASS: 0
WEAKNESS: 0
FREQUENCY: 1
DIARRHEA: 0
MYALGIAS: 0
SHORTNESS OF BREATH: 0
HEMATURIA: 0
ARTHRALGIAS: 0
HEARTBURN: 0
HEMATOCHEZIA: 0
NAUSEA: 0
PARESTHESIAS: 0

## 2019-04-26 ENCOUNTER — OFFICE VISIT (OUTPATIENT)
Dept: FAMILY MEDICINE | Facility: CLINIC | Age: 29
End: 2019-04-26
Payer: COMMERCIAL

## 2019-04-26 VITALS
TEMPERATURE: 97.8 F | OXYGEN SATURATION: 100 % | HEIGHT: 70 IN | DIASTOLIC BLOOD PRESSURE: 60 MMHG | SYSTOLIC BLOOD PRESSURE: 118 MMHG | WEIGHT: 148.3 LBS | BODY MASS INDEX: 21.23 KG/M2 | HEART RATE: 78 BPM

## 2019-04-26 DIAGNOSIS — Z32.01 PREGNANCY TEST POSITIVE: ICD-10-CM

## 2019-04-26 DIAGNOSIS — N91.2 AMENORRHEA: Primary | ICD-10-CM

## 2019-04-26 LAB — HCG UR QL: POSITIVE

## 2019-04-26 PROCEDURE — 99213 OFFICE O/P EST LOW 20 MIN: CPT | Performed by: NURSE PRACTITIONER

## 2019-04-26 PROCEDURE — 81025 URINE PREGNANCY TEST: CPT | Performed by: NURSE PRACTITIONER

## 2019-04-26 RX ORDER — DAPSONE 50 MG/G
GEL TOPICAL
Refills: 3 | COMMUNITY
Start: 2018-09-05 | End: 2019-10-09

## 2019-04-26 ASSESSMENT — ENCOUNTER SYMPTOMS
EYE PAIN: 0
DIARRHEA: 0
ARTHRALGIAS: 0
FEVER: 0
ABDOMINAL PAIN: 0
PALPITATIONS: 0
HEARTBURN: 0
HEMATOCHEZIA: 0
NAUSEA: 0
WEAKNESS: 0
JOINT SWELLING: 0
FREQUENCY: 1
BREAST MASS: 0
MYALGIAS: 0
COUGH: 0
HEADACHES: 0
PARESTHESIAS: 0
CHILLS: 0
SHORTNESS OF BREATH: 0
HEMATURIA: 0
NERVOUS/ANXIOUS: 0
DYSURIA: 0
CONSTIPATION: 0
DIZZINESS: 0
SORE THROAT: 0

## 2019-04-26 ASSESSMENT — MIFFLIN-ST. JEOR: SCORE: 1479.99

## 2019-04-26 NOTE — PROGRESS NOTES
SUBJECTIVE:   CC: Rach Peterson is an 28 year old woman who presents for preventive health visit.     Healthy Habits:     Getting at least 3 servings of Calcium per day:  Yes    Bi-annual eye exam:  Yes    Dental care twice a year:  Yes    Sleep apnea or symptoms of sleep apnea:  None    Diet:  Regular (no restrictions)    Frequency of exercise:  4-5 days/week    Duration of exercise:  30-45 minutes    Taking medications regularly:  Yes    Medication side effects:  None    PHQ-2 Total Score: 0    Additional concerns today:  Yes              Today's PHQ-2 Score:   PHQ-2 ( 1999 [employer]) 4/23/2019   Q1: Little interest or pleasure in doing things 0   Q2: Feeling down, depressed or hopeless 0   PHQ-2 Score 0   Q1: Little interest or pleasure in doing things Not at all   Q2: Feeling down, depressed or hopeless Not at all   PHQ-2 Score 0       Abuse: Current or Past(Physical, Sexual or Emotional)- No  Do you feel safe in your environment? Yes    Social History     Tobacco Use     Smoking status: Never Smoker     Smokeless tobacco: Never Used     Tobacco comment: Non smoking home   Substance Use Topics     Alcohol use: Yes     Comment: four drinks once every two weeks.         Alcohol Use 4/23/2019   Prescreen: >3 drinks/day or >7 drinks/week? Not Applicable   Prescreen: >3 drinks/day or >7 drinks/week? -       Reviewed orders with patient.  Reviewed health maintenance and updated orders accordingly - Yes      Mammogram not appropriate for this patient based on age.    Pertinent mammograms are reviewed under the imaging tab.  History of abnormal Pap smear: NO - age 21-29 PAP every 3 years recommended  PAP / HPV 5/5/2017 5/23/2014 7/6/2012   PAP NIL NIL NIL     Reviewed and updated as needed this visit by clinical staff  Tobacco  Allergies  Meds  Problems  Med Hx  Surg Hx  Fam Hx  Soc Hx          Reviewed and updated as needed this visit by Provider  Allergies  Meds  Problems            Review of Systems  "  Constitutional: Negative for chills and fever.   HENT: Negative for congestion, ear pain, hearing loss and sore throat.    Eyes: Negative for pain and visual disturbance.   Respiratory: Negative for cough and shortness of breath.    Cardiovascular: Negative for chest pain, palpitations and peripheral edema.   Gastrointestinal: Negative for abdominal pain, constipation, diarrhea, heartburn, hematochezia and nausea.   Breasts:  Positive for tenderness. Negative for breast mass and discharge.   Genitourinary: Positive for frequency. Negative for dysuria, genital sores, hematuria, pelvic pain, urgency, vaginal bleeding and vaginal discharge.   Musculoskeletal: Negative for arthralgias, joint swelling and myalgias.   Skin: Negative for rash.   Neurological: Negative for dizziness, weakness, headaches and paresthesias.   Psychiatric/Behavioral: Negative for mood changes. The patient is not nervous/anxious.      Patient Active Problem List   Diagnosis     Other acne     HSV (herpes simplex virus) infection     Impingement syndrome involving patellar fat pad of right knee     Chronic pain of right knee     Past Surgical History:   Procedure Laterality Date     HC REMOVE TONSILS/ADENOIDS,<11 Y/O      T & A <13y.o.       Social History     Tobacco Use     Smoking status: Never Smoker     Smokeless tobacco: Never Used     Tobacco comment: Non smoking home   Substance Use Topics     Alcohol use: Yes     Comment: four drinks once every two weeks.     Family History   Problem Relation Age of Onset     Cancer Maternal Aunt         Melanoma                OBJECTIVE:   /60   Pulse 78   Temp 97.8  F (36.6  C) (Oral)   Ht 1.765 m (5' 9.5\")   Wt 67.3 kg (148 lb 4.8 oz)   LMP 03/14/2019   SpO2 100%   BMI 21.59 kg/m    Physical Exam  Deferred at this time.  Patient will be seeking care at OB GYN    Diagnostic Test Results:  Positive pregnancy test    ASSESSMENT/PLAN:       ICD-10-CM    1. Amenorrhea N91.2 HCG Qual, Urine " "([id number])     CANCELED: Beta HCG qual IFA urine   2. Pregnancy test positive Z32.01        COUNSELING:  Discussed pregnancy diet and activity.  Discussed options for OB GYN and pt would like to be seen at [facility].  She has agreed to speak with her insurance company about coverage. Reviewed her topical acne medications and their pregnancy risk category.  After discussion she has decided to stay off those meds.  LMP March 14 and JENNYFER will be December 19.  Is not having any worrisome symptoms at this time.  Mild breast tenderness.    BP Readings from Last 1 Encounters:   04/26/19 118/60     Estimated body mass index is 21.59 kg/m  as calculated from the following:    Height as of this encounter: 1.765 m (5' 9.5\").    Weight as of this encounter: 67.3 kg (148 lb 4.8 oz).           reports that she has never smoked. She has never used smokeless tobacco.      Counseling Resources:  ATP IV Guidelines  Pooled Cohorts Equation Calculator  Breast Cancer Risk Calculator  FRAX Risk Assessment  ICSI Preventive Guidelines  Dietary Guidelines for Americans, 2010  USDA's MyPlate  ASA Prophylaxis  Lung CA Screening    GUANAKO Damian CNP  [facility]  "

## 2019-04-26 NOTE — PATIENT INSTRUCTIONS
Congratulations!!    Based on first day of LMP March 14 your due date would be December 19th    Let us know if you need a referral.

## 2019-06-20 ENCOUNTER — TRANSCRIBE ORDERS (OUTPATIENT)
Dept: MATERNAL FETAL MEDICINE | Facility: CLINIC | Age: 29
End: 2019-06-20

## 2019-06-20 DIAGNOSIS — O26.90 PREGNANCY RELATED CONDITION, ANTEPARTUM: Primary | ICD-10-CM

## 2019-06-21 ENCOUNTER — OFFICE VISIT (OUTPATIENT)
Dept: MATERNAL FETAL MEDICINE | Facility: CLINIC | Age: 29
End: 2019-06-21
Attending: OBSTETRICS & GYNECOLOGY
Payer: COMMERCIAL

## 2019-06-21 ENCOUNTER — PRE VISIT (OUTPATIENT)
Dept: MATERNAL FETAL MEDICINE | Facility: CLINIC | Age: 29
End: 2019-06-21

## 2019-06-21 ENCOUNTER — HOSPITAL ENCOUNTER (OUTPATIENT)
Dept: ULTRASOUND IMAGING | Facility: CLINIC | Age: 29
Discharge: HOME | End: 2019-06-21
Attending: OBSTETRICS & GYNECOLOGY | Admitting: OBSTETRICS & GYNECOLOGY
Payer: COMMERCIAL

## 2019-06-21 ENCOUNTER — TELEPHONE (OUTPATIENT)
Dept: MATERNAL FETAL MEDICINE | Facility: CLINIC | Age: 29
End: 2019-06-21

## 2019-06-21 DIAGNOSIS — O35.9XX0: ICD-10-CM

## 2019-06-21 DIAGNOSIS — O35.10X0 SUSPECTED CHROMOSOME ANOMALY OF FETUS AFFECTING MANAGEMENT OF MOTHER, ANTEPARTUM, SINGLE OR UNSPECIFIED FETUS: Primary | ICD-10-CM

## 2019-06-21 DIAGNOSIS — O35.9XX0 SUSPECTED FETAL ANOMALY, ANTEPARTUM, NOT APPLICABLE OR UNSPECIFIED FETUS: Primary | ICD-10-CM

## 2019-06-21 DIAGNOSIS — O35.10X0 SUSPECTED CHROMOSOME ANOMALY OF FETUS AFFECTING MANAGEMENT OF MOTHER, ANTEPARTUM, SINGLE OR UNSPECIFIED FETUS: ICD-10-CM

## 2019-06-21 DIAGNOSIS — O26.90 PREGNANCY RELATED CONDITION, ANTEPARTUM: ICD-10-CM

## 2019-06-21 DIAGNOSIS — O35.FXX1 FETAL OMPHALOCELE DURING PREGNANCY, ANTEPARTUM, FETUS 1: Primary | ICD-10-CM

## 2019-06-21 PROCEDURE — 76805 OB US >/= 14 WKS SNGL FETUS: CPT

## 2019-06-21 PROCEDURE — 40000072 ZZH STATISTIC GENETIC COUNSELING, < 16 MIN: Mod: ZF | Performed by: GENETIC COUNSELOR, MS

## 2019-06-21 RX ORDER — DOXYCYCLINE 100 MG/10ML
100 INJECTION, POWDER, LYOPHILIZED, FOR SOLUTION INTRAVENOUS
Status: CANCELLED | OUTPATIENT
Start: 2019-06-21

## 2019-06-21 NOTE — PROGRESS NOTES
"Please see \"Imaging\" tab under Chart Review for full details.    Jessenia Nicole MD  Maternal Fetal Medicine    "

## 2019-06-21 NOTE — NURSING NOTE
Rach presented to [facility] for US for abnormal ultrasound at outside clinic. Dr. Nicole discussed US findings with patient. Patient wishes to proceed with D&E. 24 hour TOP consent done by Dr. Nicole. YANA Fernandes met with patient. Financial counselors emailed for D&E insurance coverage.    Email response from Brandy Coleman: I spoke with Lucero PAULINO of [facility] at [phone] the procedure of  whether elective or medically necessary is a covered benefit under maternity with the only exclusion is the  cannot be performed at home. No prior auth is required. No referral is required since the patient s PCC is [facility]. The call ref #[id number]    Dom will complete TOP checklist. Plan to fax checklist and 24 hour consent to [facility]. GARY Ocampo at [facility] stated they would call patient on Monday but probably won't be able to get her in until the following week for procedure.

## 2019-06-21 NOTE — TELEPHONE ENCOUNTER
2019    Called Rach's  Tim as requested to discuss care coordination for termination of pregnancy.  Per our financial counselor:    I spoke with Lucero PAULINO of [facility] at [phone] the procedure of  whether elective or medically necessary is a covered benefit under maternity with the only exclusion is the  cannot be performed at home. No prior auth is required. No referral is required since the patient s PCC is [facility]. The call ref #[id number]      This information was relayed to Tim who had no questions at this time.  Rach would like to move forward with scheduling with [facility], who will be contacting her to arrange care.     Dom Matthews MS, [facility]  Licensed Genetic Counselor  Phone: [phone]  Pager: [phone]

## 2019-06-21 NOTE — PROGRESS NOTES
[facility]  Genetic Counseling Consult    Patient: Rach Peterson YOB: 1990   Date of Service:  6/21/19      Rach Peterson was seen at the [facility] for genetic consultation given abnormal ultrasound findings.  Rach was accompanied to her appointment today by her  Tim.       Impression/Plan:   1. Rach had an ultrasound today which noted a large omphalocele.  Please see corresponding documentation under the imaging tab for full details.   After discussion with Dr. Nicole, Rach has decided to move forward with termination of pregnancy.  Genetic counseling met briefly with the couple to discuss next steps and coordinate care.     2. We discussed that omphalocele have a high association with underlying genetic conditions, in particular chromosome abnormalities such as trisomy 21, 18, 13.  If an underlying genetic cause is identified, it can help provide recurrence risk information for future pregnancies.  We discussed that genetic testing can be coordinated on products of conception as a part of her care for termination of pregnancy, and Rach is interested in having all reasonable testing done.    3. A referral to [facility] for termination of pregnancy was completed today, with a plan for chromosome analysis and microarray on products of conception.      4. Rach completed a consent to communicate with her  Tim ([phone]) to help facilitate coordination of care moving forward.     It was a pleasure to be involved with Rach s care.  Our clinic remains available as a resource in any capacity needed and Rach was encouraged to contact genetic counseling with any questions or concerns. Face-to-face time of the meeting was 15 minutes.    Dom Matthews MS, [facility]  Licensed Genetic Counselor  Phone: [phone]  Pager: [phone]

## 2019-06-24 ENCOUNTER — TELEPHONE (OUTPATIENT)
Dept: OBGYN | Facility: CLINIC | Age: 29
End: 2019-06-24

## 2019-06-24 DIAGNOSIS — N88.2 CERVICAL STENOSIS (UTERINE CERVIX): Primary | ICD-10-CM

## 2019-06-24 RX ORDER — MISOPROSTOL 200 UG/1
TABLET ORAL
Qty: 2 TABLET | Refills: 0 | Status: ON HOLD | OUTPATIENT
Start: 2019-06-24 | End: 2019-07-03

## 2019-06-24 NOTE — TELEPHONE ENCOUNTER
Rach Dumont's spouse calling back to let us know that surgery 7/3 at 11 am will work. They were hoping for something sooner, but aware not possible.    Misoprostol instructions given and e-prescribed to [facility] on [address] in [facility].    Surgery packet with instructions will be mailed today. Informed also pre-admission nurse will be calling next week.He indicated understanding and agreed with plan.    24 hr consent priority scanned into epic chart.

## 2019-06-24 NOTE — TELEPHONE ENCOUNTER
Received referral for D&E from [facility] for fetal omphalocele. Patient is currently 14 weeks 4 days. Dr. Herring input orders for surgery. Received 24 hour consent from [facility].    Spoke with patient's  Tim ([phone]) and earliest surgery date is 7/3 at 11am and it's on hold.  is going to call back if they want that date.

## 2019-06-25 ENCOUNTER — TELEPHONE (OUTPATIENT)
Dept: MATERNAL FETAL MEDICINE | Facility: CLINIC | Age: 29
End: 2019-06-25

## 2019-06-25 ENCOUNTER — TELEPHONE (OUTPATIENT)
Dept: OBGYN | Facility: CLINIC | Age: 29
End: 2019-06-25

## 2019-06-25 NOTE — TELEPHONE ENCOUNTER
Rach sent [facility] paperwork to YANA Fernandes with request to be off work this week and next week. I called patient to discuss paperwork and time off. We discussed her wanting the FMLA start date to be 6/19/19 (diagnosis of abnormal US) and would like to be off for a couple weeks after the procedure (procedure 7/3/19) with a return to work date of 7/22/19. We discussed that we can always change this date if she wants to go back sooner. Rach appreciated phone call. Paperwork filled out and signed by Dr. Nicole. Paperwork faxed to Carolynn Schroeder at [phone] per Rach's request.

## 2019-06-25 NOTE — TELEPHONE ENCOUNTER
Confirmed surgery date with patient 7/3/19 with arrival time at 9:00a.m with nothing to eat eight hours before scheduled surgery time and clear liquids up to two hours before scheduled surgery time, informed patient that a surgery map and letter will be mailed out.     to complete the following fields:            CHECKLIST     Google Calendar : Yes     Resident notified: Not Applicable     Clinic schedule blocked:  Not Applicable    Patient notified:Yes      Pre op information sent: Yes     Given to patient over the phone.Yes    Comments:

## 2019-06-26 ENCOUNTER — TELEPHONE (OUTPATIENT)
Dept: MATERNAL FETAL MEDICINE | Facility: CLINIC | Age: 29
End: 2019-06-26

## 2019-06-28 NOTE — TELEPHONE ENCOUNTER
6/26/19    Called Rach to check and see how she was doing.  She reports that she is having ups and downs, and we discussed that this is not unexpected.  She reports some continued frustration at the delay in scheduling her D&E, and we discussed that one other possible alternative would be an early induction of labor for termination of pregnancy.  This care can typically be coordinated more quickly than a D&E, and we reviewed how labor induction would be different than D&E, and the benefits and limitations.  This option was briefly discussed with [facility] physician Dr. Oglesby.  Rach reports that she is unlikely to want to pursue this option, but would like to discuss with Tim first. All of Rach's questions were answered to her satisfaction at this time and she was encouraged to contact me moving forward with any questions or concerns.     Russell called genetic counseling (consent to communicate on file) and reports that Rach is not interested in changing care plans to induction of labor, and that the couple plans to proceed with her already scheduled D&E next week.     Dom Matthews MS, [facility]  Licensed Genetic Counselor  Phone: [phone]  Pager: [phone]

## 2019-07-02 ENCOUNTER — ANESTHESIA EVENT (OUTPATIENT)
Dept: SURGERY | Facility: CLINIC | Age: 29
End: 2019-07-02
Payer: COMMERCIAL

## 2019-07-02 NOTE — ANESTHESIA PREPROCEDURE EVALUATION
Anesthesia Pre-Procedure Evaluation    Patient: Rach Peterson   MRN:     [id number] Gender:   female   Age:    28 year old :      1990        Preoperative Diagnosis: Omphacocele   Procedure(s):  Dilation And Evacuation     Past Medical History:   Diagnosis Date     Syncope and collapse     Cardiology workup with Dr Brady, wandering atrial pacer, no activity restritction and no need for abx prophylaxis (mild pulm flow murmur)      Past Surgical History:   Procedure Laterality Date     HC REMOVE TONSILS/ADENOIDS,<11 Y/O      T & A <13y.o.          Anesthesia Evaluation     . Pt has had prior anesthetic.            ROS/MED HX    ENT/Pulmonary:  - neg pulmonary ROS     Neurologic:  - neg neurologic ROS     Cardiovascular:  - neg cardiovascular ROS   (+) ----. : . . . :. . No previous cardiac testing       METS/Exercise Tolerance:     Hematologic:         Musculoskeletal:   (+)  other musculoskeletal- chronic knee pain      GI/Hepatic:  - neg GI/hepatic ROS       Renal/Genitourinary:  - ROS Renal section negative       Endo:  - neg endo ROS       Psychiatric:     (+) psychiatric history anxiety      Infectious Disease:  - neg infectious disease ROS       Malignancy:         Other: Comment: ~16 weeks gestational age, fetus with omphalocele, desires termination   (+) no H/O Chronic Pain,                   [facility] AN PHYSICAL EXAM    Lab Results   Component Value Date    HGB 13.3 2010    TSH 0.76 2010    T4 0.98 2010    HCG Positive (A) 2019       Preop Vitals  BP Readings from Last 3 Encounters:   19 118/60   18 124/68   18 110/70    Pulse Readings from Last 3 Encounters:   19 78   18 86   17 71      Resp Readings from Last 3 Encounters:   17 16   17 16   16 16    SpO2 Readings from Last 3 Encounters:   19 100%   18 100%   17 100%      Temp Readings from Last 1 Encounters:   19 36.6  C (97.8  F) (Oral)     "Ht Readings from Last 1 Encounters:   04/26/19 1.765 m (5' 9.5\")      Wt Readings from Last 1 Encounters:   04/26/19 67.3 kg (148 lb 4.8 oz)    Estimated body mass index is 21.59 kg/m  as calculated from the following:    Height as of 4/26/19: 1.765 m (5' 9.5\").    Weight as of 4/26/19: 67.3 kg (148 lb 4.8 oz).     LDA:            Assessment:   ASA SCORE: 2    NPO Status: > 6 hours since completed [employer]   Documentation: H&P complete; Preop Testing complete; Consents complete   Proceeding: Proceed without further delay  Tobacco Use:  NO Active use of Tobacco/UNKNOWN Tobacco use status     Plan:   Anes. Type:  MAC   Pre-Induction: Midazolam IV   Induction:  IV (Standard)   Airway: Native Airway   Access/Monitoring: PIV   Maintenance: Propofol; IV   Emergence: Procedure Site   Logistics: Same Day Surgery     Postop Pain/Sedation Strategy:  Standard-Options: Opioids PRN     PONV Management:  Adult Risk Factors: Female, Non-Smoker, Postop Opioids  Prevention: Propofol Infusion; Ondansetron                         Micaela Gupta MD  "

## 2019-07-03 ENCOUNTER — HOSPITAL ENCOUNTER (OUTPATIENT)
Facility: CLINIC | Age: 29
Discharge: HOME | End: 2019-07-03
Attending: OBSTETRICS & GYNECOLOGY | Admitting: OBSTETRICS & GYNECOLOGY
Payer: COMMERCIAL

## 2019-07-03 ENCOUNTER — ANESTHESIA (OUTPATIENT)
Dept: SURGERY | Facility: CLINIC | Age: 29
End: 2019-07-03
Payer: COMMERCIAL

## 2019-07-03 VITALS
OXYGEN SATURATION: 100 % | HEIGHT: 70 IN | TEMPERATURE: 98.6 F | HEART RATE: 57 BPM | RESPIRATION RATE: 14 BRPM | DIASTOLIC BLOOD PRESSURE: 65 MMHG | SYSTOLIC BLOOD PRESSURE: 105 MMHG | WEIGHT: 153 LBS | BODY MASS INDEX: 21.9 KG/M2

## 2019-07-03 DIAGNOSIS — Z98.890 S/P D&C (STATUS POST DILATION AND CURETTAGE): Primary | ICD-10-CM

## 2019-07-03 LAB
GLUCOSE BLDC GLUCOMTR-MCNC: 72 MG/DL (ref 70–99)
HGB BLD-MCNC: 12.2 G/DL (ref 11.7–15.7)

## 2019-07-03 PROCEDURE — 81229 CYTOG ALYS CHRML ABNR SNPCGH: CPT | Performed by: OBSTETRICS & GYNECOLOGY

## 2019-07-03 PROCEDURE — 88305 TISSUE EXAM BY PATHOLOGIST: CPT | Performed by: OBSTETRICS & GYNECOLOGY

## 2019-07-03 PROCEDURE — 40000170 ZZH STATISTIC PRE-PROCEDURE ASSESSMENT II: Performed by: OBSTETRICS & GYNECOLOGY

## 2019-07-03 PROCEDURE — 25000125 ZZHC RX 250: Performed by: OBSTETRICS & GYNECOLOGY

## 2019-07-03 PROCEDURE — 85018 HEMOGLOBIN: CPT | Performed by: OBSTETRICS & GYNECOLOGY

## 2019-07-03 PROCEDURE — 37000009 ZZH ANESTHESIA TECHNICAL FEE, EACH ADDTL 15 MIN: Performed by: OBSTETRICS & GYNECOLOGY

## 2019-07-03 PROCEDURE — 00000159 ZZHCL STATISTIC H-SEND OUTS PREP: Performed by: OBSTETRICS & GYNECOLOGY

## 2019-07-03 PROCEDURE — 88305 TISSUE EXAM BY PATHOLOGIST: CPT | Mod: 26 | Performed by: OBSTETRICS & GYNECOLOGY

## 2019-07-03 PROCEDURE — 36415 COLL VENOUS BLD VENIPUNCTURE: CPT | Performed by: OBSTETRICS & GYNECOLOGY

## 2019-07-03 PROCEDURE — 36000051 ZZH SURGERY LEVEL 2 1ST 30 MIN - UMMC: Performed by: OBSTETRICS & GYNECOLOGY

## 2019-07-03 PROCEDURE — 25000128 H RX IP 250 OP 636: Performed by: STUDENT IN AN ORGANIZED HEALTH CARE EDUCATION/TRAINING PROGRAM

## 2019-07-03 PROCEDURE — 25000128 H RX IP 250 OP 636: Performed by: OBSTETRICS & GYNECOLOGY

## 2019-07-03 PROCEDURE — 88233 TISSUE CULTURE SKIN/BIOPSY: CPT | Performed by: OBSTETRICS & GYNECOLOGY

## 2019-07-03 PROCEDURE — 25800030 ZZH RX IP 258 OP 636: Performed by: STUDENT IN AN ORGANIZED HEALTH CARE EDUCATION/TRAINING PROGRAM

## 2019-07-03 PROCEDURE — 88262 CHROMOSOME ANALYSIS 15-20: CPT | Performed by: OBSTETRICS & GYNECOLOGY

## 2019-07-03 PROCEDURE — 82962 GLUCOSE BLOOD TEST: CPT

## 2019-07-03 PROCEDURE — 37000008 ZZH ANESTHESIA TECHNICAL FEE, 1ST 30 MIN: Performed by: OBSTETRICS & GYNECOLOGY

## 2019-07-03 PROCEDURE — 40000803 ZZHCL STATISTIC DNA ISOL HIGH PURITY: Performed by: OBSTETRICS & GYNECOLOGY

## 2019-07-03 PROCEDURE — 36000053 ZZH SURGERY LEVEL 2 EA 15 ADDTL MIN - UMMC: Performed by: OBSTETRICS & GYNECOLOGY

## 2019-07-03 PROCEDURE — 71000027 ZZH RECOVERY PHASE 2 EACH 15 MINS: Performed by: OBSTETRICS & GYNECOLOGY

## 2019-07-03 PROCEDURE — 27210794 ZZH OR GENERAL SUPPLY STERILE: Performed by: OBSTETRICS & GYNECOLOGY

## 2019-07-03 RX ORDER — VASOPRESSIN 20 U/ML
INJECTION PARENTERAL PRN
Status: DISCONTINUED | OUTPATIENT
Start: 2019-07-03 | End: 2019-07-08 | Stop reason: HOSPADM

## 2019-07-03 RX ORDER — SODIUM CHLORIDE, SODIUM LACTATE, POTASSIUM CHLORIDE, CALCIUM CHLORIDE 600; 310; 30; 20 MG/100ML; MG/100ML; MG/100ML; MG/100ML
INJECTION, SOLUTION INTRAVENOUS CONTINUOUS
Status: DISCONTINUED | OUTPATIENT
Start: 2019-07-03 | End: 2019-07-08 | Stop reason: HOSPADM

## 2019-07-03 RX ORDER — METHYLERGONOVINE MALEATE 0.2 MG/ML
INJECTION INTRAVENOUS PRN
Status: DISCONTINUED | OUTPATIENT
Start: 2019-07-03 | End: 2019-07-03

## 2019-07-03 RX ORDER — FENTANYL CITRATE 50 UG/ML
INJECTION, SOLUTION INTRAMUSCULAR; INTRAVENOUS PRN
Status: DISCONTINUED | OUTPATIENT
Start: 2019-07-03 | End: 2019-07-03

## 2019-07-03 RX ORDER — LIDOCAINE 40 MG/G
CREAM TOPICAL
Status: DISCONTINUED | OUTPATIENT
Start: 2019-07-03 | End: 2019-07-08 | Stop reason: HOSPADM

## 2019-07-03 RX ORDER — ONDANSETRON 4 MG/1
4 TABLET, ORALLY DISINTEGRATING ORAL EVERY 30 MIN PRN
Status: DISCONTINUED | OUTPATIENT
Start: 2019-07-03 | End: 2019-07-08 | Stop reason: HOSPADM

## 2019-07-03 RX ORDER — SODIUM CHLORIDE, SODIUM LACTATE, POTASSIUM CHLORIDE, CALCIUM CHLORIDE 600; 310; 30; 20 MG/100ML; MG/100ML; MG/100ML; MG/100ML
INJECTION, SOLUTION INTRAVENOUS CONTINUOUS PRN
Status: DISCONTINUED | OUTPATIENT
Start: 2019-07-03 | End: 2019-07-03

## 2019-07-03 RX ORDER — PROPOFOL 10 MG/ML
INJECTION, EMULSION INTRAVENOUS PRN
Status: DISCONTINUED | OUTPATIENT
Start: 2019-07-03 | End: 2019-07-03

## 2019-07-03 RX ORDER — NALOXONE HYDROCHLORIDE 0.4 MG/ML
.1-.4 INJECTION, SOLUTION INTRAMUSCULAR; INTRAVENOUS; SUBCUTANEOUS
Status: DISCONTINUED | OUTPATIENT
Start: 2019-07-03 | End: 2019-07-04 | Stop reason: HOSPADM

## 2019-07-03 RX ORDER — OXYCODONE HYDROCHLORIDE 5 MG/1
5 TABLET ORAL
Status: DISCONTINUED | OUTPATIENT
Start: 2019-07-03 | End: 2019-07-08 | Stop reason: HOSPADM

## 2019-07-03 RX ORDER — PROPOFOL 10 MG/ML
INJECTION, EMULSION INTRAVENOUS CONTINUOUS PRN
Status: DISCONTINUED | OUTPATIENT
Start: 2019-07-03 | End: 2019-07-03

## 2019-07-03 RX ORDER — ACETAMINOPHEN 325 MG/1
650 TABLET ORAL
Status: DISCONTINUED | OUTPATIENT
Start: 2019-07-03 | End: 2019-07-08 | Stop reason: HOSPADM

## 2019-07-03 RX ORDER — FENTANYL CITRATE 50 UG/ML
25-50 INJECTION, SOLUTION INTRAMUSCULAR; INTRAVENOUS
Status: DISCONTINUED | OUTPATIENT
Start: 2019-07-03 | End: 2019-07-08 | Stop reason: HOSPADM

## 2019-07-03 RX ORDER — DOXYCYCLINE 100 MG/10ML
100 INJECTION, POWDER, LYOPHILIZED, FOR SOLUTION INTRAVENOUS
Status: COMPLETED | OUTPATIENT
Start: 2019-07-03 | End: 2019-07-03

## 2019-07-03 RX ORDER — ONDANSETRON 2 MG/ML
4 INJECTION INTRAMUSCULAR; INTRAVENOUS EVERY 30 MIN PRN
Status: DISCONTINUED | OUTPATIENT
Start: 2019-07-03 | End: 2019-07-08 | Stop reason: HOSPADM

## 2019-07-03 RX ORDER — METHYLERGONOVINE MALEATE 0.2 MG/1
0.2 TABLET ORAL EVERY 8 HOURS
Qty: 9 TABLET | Refills: 0 | Status: SHIPPED | OUTPATIENT
Start: 2019-07-03 | End: 2019-07-18

## 2019-07-03 RX ORDER — KETOROLAC TROMETHAMINE 30 MG/ML
INJECTION, SOLUTION INTRAMUSCULAR; INTRAVENOUS PRN
Status: DISCONTINUED | OUTPATIENT
Start: 2019-07-03 | End: 2019-07-03

## 2019-07-03 RX ORDER — HYDROMORPHONE HYDROCHLORIDE 1 MG/ML
.3-.5 INJECTION, SOLUTION INTRAMUSCULAR; INTRAVENOUS; SUBCUTANEOUS EVERY 10 MIN PRN
Status: DISCONTINUED | OUTPATIENT
Start: 2019-07-03 | End: 2019-07-08 | Stop reason: HOSPADM

## 2019-07-03 RX ORDER — MAGNESIUM HYDROXIDE 1200 MG/15ML
LIQUID ORAL PRN
Status: DISCONTINUED | OUTPATIENT
Start: 2019-07-03 | End: 2019-07-08 | Stop reason: HOSPADM

## 2019-07-03 RX ADMIN — METHYLERGONOVINE MALEATE 200 MCG: 0.2 INJECTION INTRAMUSCULAR; INTRAVENOUS at 11:29

## 2019-07-03 RX ADMIN — SODIUM CHLORIDE, POTASSIUM CHLORIDE, SODIUM LACTATE AND CALCIUM CHLORIDE: 600; 310; 30; 20 INJECTION, SOLUTION INTRAVENOUS at 10:48

## 2019-07-03 RX ADMIN — MIDAZOLAM 2 MG: 1 INJECTION INTRAMUSCULAR; INTRAVENOUS at 10:52

## 2019-07-03 RX ADMIN — FENTANYL CITRATE 50 MCG: 50 INJECTION, SOLUTION INTRAMUSCULAR; INTRAVENOUS at 10:58

## 2019-07-03 RX ADMIN — PROPOFOL 30 MG: 10 INJECTION, EMULSION INTRAVENOUS at 10:52

## 2019-07-03 RX ADMIN — KETOROLAC TROMETHAMINE 30 MG: 30 INJECTION, SOLUTION INTRAMUSCULAR at 11:34

## 2019-07-03 RX ADMIN — FENTANYL CITRATE 25 MCG: 50 INJECTION, SOLUTION INTRAMUSCULAR; INTRAVENOUS at 11:06

## 2019-07-03 RX ADMIN — DOXYCYCLINE 100 MG: 100 INJECTION, POWDER, LYOPHILIZED, FOR SOLUTION INTRAVENOUS at 10:57

## 2019-07-03 RX ADMIN — PROPOFOL 125 MCG/KG/MIN: 10 INJECTION, EMULSION INTRAVENOUS at 10:52

## 2019-07-03 RX ADMIN — PROPOFOL 20 MG: 10 INJECTION, EMULSION INTRAVENOUS at 10:55

## 2019-07-03 ASSESSMENT — MIFFLIN-ST. JEOR: SCORE: 1509.25

## 2019-07-03 NOTE — H&P
"Gynecology Consult Note    HPI:  Rach Peterson is a 28 year old female  at 15w6d here for D&E for fetal anomalies, including a large omphalocele.     ROS:    Skin: negative  Respiratory: No shortness of breath, dyspnea on exertion, cough, or hemoptysis  Cardiovascular: negative  Gastrointestinal: negative  Genitourinary: negative    PMH:  Past Medical History:   Diagnosis Date     Syncope and collapse     Cardiology workup with Dr Brady, wandering atrial pacer, no activity restritction and no need for abx prophylaxis (mild pulm flow murmur)       PSHx:  Past Surgical History:   Procedure Laterality Date     GYN SURGERY       HC REMOVE TONSILS/ADENOIDS,<13 Y/O      T & A <13y.o.       Medications:  Current Facility-Administered Medications   Medication     doxycycline (VIBRAMYCIN) 100 mg vial to attach to  mL bag     lactated ringers infusion     lidocaine (LMX4) cream     lidocaine 1 % 0.1-1 mL     Provider ordered ALTERNATE pre op antibiotic.     sodium chloride (PF) 0.9% PF flush 3 mL     sodium chloride (PF) 0.9% PF flush 3 mL       Allergies:     Allergies   Allergen Reactions     No Known Drug Allergies        Physical Exam:   Vitals:    19 0800   BP: 124/69   Resp: 16   Temp: 98.6  F (37  C)   SpO2: 99%   Weight: 69.4 kg (153 lb)   Height: 1.778 m (5' 10\")      Gen: patient appears tearful, no acute distress  CV: RRR, no m/r/g  Pulm: CTAB, no increased work of breathing  Abd: non-tender, non-distended, +BS  Pelvis: deferrred    A&P:  Will proceed with procedure as indicated. Patient is consented and understands the risks and benefits of this procedure. Low risk for procedure.     Julianne Kwon MD  OBGYN Resident, PGY1    Appreciate note by Dr. Kwon. Patient has been seen and examined by me with the resident, agree with above note. Plan for genetic testing per .     Josefa Kwan MD  10:44 AM      "

## 2019-07-03 NOTE — DISCHARGE INSTRUCTIONS
Same-Day Surgery   Adult Discharge Orders & Instructions     For 24 hours after surgery:  1. Get plenty of rest.  A responsible adult must stay with you for at least 24 hours after you leave the hospital.   2. Pain medication can slow your reflexes. Do not drive or use heavy equipment.  If you have weakness or tingling, don't drive or use heavy equipment until this feeling goes away.  3. Mixing alcohol and pain medication can cause dizziness and slow your breathing. It can even be fatal. Do not drink alcohol while taking pain medication.  4. Avoid strenuous or risky activities.  Ask for help when climbing stairs.   5. You may feel lightheaded.  If so, sit for a few minutes before standing.  Have someone help you get up.   6. If you have nausea (feel sick to your stomach), drink only clear liquids such as apple juice, ginger ale, broth or 7-Up.  Rest may also help.  Be sure to drink enough fluids.  Move to a regular diet as you feel able. Take pain medications with a small amount of solid food, such as toast or crackers, to avoid nausea.   7. A slight fever is normal. Call the doctor if your fever is over 100 F (37.7 C) (taken under the tongue) or lasts longer than 24 hours.  8. You may have a dry mouth, muscle aches, trouble sleeping or a sore throat.  These symptoms should go away after 24 hours.  9. Do not make important or legal decisions.   Pain Management:      1. Take pain medication (if prescribed) for pain as directed by your physician.        2. WARNING: If the pain medication you have been prescribed contains Tylenol  (acetaminophen), DO NOT take additional doses of Tylenol (acetaminophen).     Call your doctor for any of the followin.  Signs of infection (fever, growing tenderness at the surgery site, severe pain, a large amount of drainage or bleeding, foul-smelling drainage, redness, swelling).    2.  It has been over 8 to 10 hours since surgery and you are still not able to urinate (pee).    3.   Headache for over 24 hours.    4.  Numbness, tingling or weakness the day after surgery (if you had spinal anesthesia).  To contact a doctor, call _____________________________________ or:      [phone] and ask for the Resident On Call for:          __________________________________________ (answered 24 hours a day)      Emergency Department:  [facility]: [phone]  [facility]: [phone]               Rev. 10/2014   Discharge Instructions: Following a Dilation   and Curettage/Dilation and Evacuation    What to expect:    Expect small to moderate amount of vaginal bleeding which should taper off in 4-5 days. It should not be heavier than your regular menstrual flow.    Do not douche, and use a pad rather than tampons.     No intercourse until bleeding has ceased.    Activity:    Rest the day of surgery. You may resume normal activity the next day.    You may bathe or shower.    Avoid heavy lifting (10-15 lbs) for one week.    Comfort:    The amount of discomfort you can expect is very unpredictable. If you have pain that cannot be controlled with non-aspirin pain relievers or with the prescription you may have received, you should notify your doctor.    Abdominal cramping (like menstrual cramps) or low back ache are common and should not be a cause for concern. You will be drowsy and weak the day of surgery and possibly the following day.    Diet:    You have no restrictions on your diet. Following surgery, drink plenty of fluids and eat a light meal.    Nausea:    The anesthesia medications you received during your surgical procedure may produce some nausea.    If you feel nauseated, stay in bed, keep your head down and try drinking fluids such as Seven-Up, tea or soup.    Notify Physician at once if you experience:    A fever over 100 degrees (a low grade fever under 100 degrees is usual after surgery).    Heavy flow and/or passing large clots. Saturating more  than 1 pad per hour for 2 or more hours.     Severe pain or cramps.  Rev. 5/12

## 2019-07-03 NOTE — ANESTHESIA POSTPROCEDURE EVALUATION
Anesthesia POST Procedure Evaluation    Patient: Rach Peterson   MRN:     [id number] Gender:   female   Age:    28 year old :      1990        Preoperative Diagnosis: Omphacocele   Procedure(s):  Dilation And Evacuation   Postop Comments: No value filed.       Anesthesia Type:  MAC  No value filed.    Reportable Event: NO     PAIN: Uncomplicated   Sign Out status: Comfortable, Well controlled pain     PONV: No PONV   Sign Out status:  No Nausea or Vomiting     Neuro/Psych: Uneventful perioperative course   Sign Out Status: Preoperative baseline; Age appropriate mentation     Airway/Resp.: Uneventful perioperative course   Sign Out Status: Non labored breathing, age appropriate RR; Resp. Status within EXPECTED Parameters     CV: Uneventful perioperative course   Sign Out status: Appropriate BP and perfusion indices; Appropriate HR/Rhythm     Disposition:   Sign Out in:  PACU  Disposition:  Phase II; Home  Recovery Course: Uneventful  Follow-Up: Not required                    Last PACU Vitals:  Vitals Value Taken Time   BP     Temp     Pulse     Resp     SpO2     Temp src     NIBP 108/79 7/3/2019 11:34 AM   Pulse 69 7/3/2019 11:35 AM   SpO2 98 % 7/3/2019 11:35 AM   Resp     Temp     Ht Rate 65 7/3/2019 11:32 AM   Temp 2 36.4  C (97.5  F) 7/3/2019 11:31 AM         Electronically Signed By: Criss Bang MD, July 3, 2019, 3:02 PM

## 2019-07-03 NOTE — OP NOTE
[facility]  Operative Note    Rach Peterson   [id number]  1990    Date: 7/3/2019      Pre-operative Diagnosis:   1. IUP at 15w6d    2. Fetal omphalocele     Post-operative Diagnosis:   1. Same     Procedure: EUA, Dilation and Evacuation under ultrasound guidance     Surgeon: Josefa Kwan MD    Assistants: Malinda You MD PGY-4, Julianne Kwon MD PGY-1, Willam Posey MS3    Anesthesia: MAC and paracervical block using 1% lidocaine with vasopressin    EBL: 20 cc     IVF: 350 cc crystalloid     UOP: not measured    Specimen: Products of conception    Complications: None apparent    Findings: Normal external female genitalia. Normal urethral meatus. Uterus was 16 weeks in size. Cervix was soft and easily dilated to 49 with Baez dilators. Complete fetal parts noted on examination after the procedure. Uterus empty with gritty texture circumferentially appreciated on gentle sharp curettage at the end of the procedure.     Indication: Rach Peterson is a 28 year old  at 15w6d with fetal omphalocele.  Patient desired termination due to this fatal condition. The above listed procedure was recommended. All questions were answered and concerns addressed. Informed written consent was obtained.       Procedure: The patient was taken to the operating room and following light sedation, she was placed in the dorsal lithotomy position. Exam under anesthesia was preformed and  were removed and counted. The cervix was noted to be soft and slightly dilated. The patient was prepped and draped in usual fashion. After a time-out was preformed, a sterile speculum was placed. Two ml of 1% lidocaine with vasopressin was injected in the anterior cervical lip, which was then grasped with a tenaculum. A paracervical block was performed by injecting a total of 18 ml of 1% lidocaine with vasopressin at 4 and 8 o'clock. The cervix easily dilated to 49 with baez dilators. An 11 curved  curette was used to rupture the membranes and all amniotic fluid drained. Fetus was evacuated in pieces in the standard fashion with David forceps. Once complete fetal parts were noted, the endometrial canal was gentle curetted with a sharp curette with circumferential grittiness appreciated. Suction curettage was preformed and the endometrial canal was note to be empty. Ultrasound guidance was used and the uterus was noted to be empty. The single tooth tenaculum was removed and tenaculum sights were hemostatic. Speculum was removed. The patient tolerated the procedure well and was then transferred to recovery room in stable condition. Dr. Kwan was present and scrubbed for the entire case.     Malinda You MD PhD  Ob/Gyn PGY-4  7/3/2019 11:35 AM    I was present and scrubbed for entire procedure.   Josefa Kwan MD

## 2019-07-05 ENCOUNTER — DOCUMENTATION ONLY (OUTPATIENT)
Dept: OTHER | Facility: CLINIC | Age: 29
End: 2019-07-05

## 2019-07-12 LAB — COPATH REPORT: NORMAL

## 2019-07-18 ENCOUNTER — OFFICE VISIT (OUTPATIENT)
Dept: OBGYN | Facility: CLINIC | Age: 29
End: 2019-07-18
Attending: OBSTETRICS & GYNECOLOGY
Payer: COMMERCIAL

## 2019-07-18 VITALS
DIASTOLIC BLOOD PRESSURE: 81 MMHG | SYSTOLIC BLOOD PRESSURE: 119 MMHG | BODY MASS INDEX: 22.1 KG/M2 | HEART RATE: 74 BPM | WEIGHT: 154 LBS

## 2019-07-18 DIAGNOSIS — Z98.890 S/P DILATATION AND CURETTAGE: Primary | ICD-10-CM

## 2019-07-18 ASSESSMENT — PAIN SCALES - GENERAL: PAINLEVEL: NO PAIN (0)

## 2019-07-18 NOTE — LETTER
7/18/2019    RE: Rach Peterson  [address] [place] [address]     Dear Colleague,    Thank you for referring your patient, Rach Peterson, to the [facility] at [facility]. Please see a copy of my visit note below.    S:  Pt is a 27 y/o now  s/p D&E at 15+6 weeks on 7/3/19 for fetal anomalies including a large omphalocele.  She states that she has been doing well since the procedure-still having some spotting.  During the visit she got very tearful-reassures me that she has been better than she looks today, just emotional being back here, worries that her periods won't be regular again (always regular in the past), will have infertility (took only 4 months to conceive) or miscarriage with her next pregnancy.   She states that she is well established with a therapist and has been seeing her regularly and is starting a grief support group soon.  She does not desire medication at this time.  Her  was present today.  He has also been seeing his therapist.  They are planning to attempt pregnancy again, but they both want to work through their grief before another pregnancy.       O: /81   Pulse 74   Wt 69.9 kg (154 lb)   LMP 03/14/2019   Breastfeeding? No   BMI 22.10 kg/m     gen-pleasant, tearful at times    UPT negative    Chromosomes and CGH still pending  Routine pathology normal    A:  27 y/o  2 weeks s/p D&E at 15+6 weeks 2/2 fetal anomalies    P:  Reviewed her negative UPT today, spotting that may last another 2+ weeks, likely resumption of menses in the next 4-6 weeks.  Discussed that the final genetic results may take another 4 weeks but we will notify her of the results when they are available.  We discussed her above fears and concerns at length as well as her mood.  She states that she will contact us if she feels like her mood is not getting any better or is getting worse.  Discussed waiting until  she has at least one normal period before attempting pregnancy again. She was referred to our clinic for her D&C-she will likely return to her primary OB provider for future prenatal care.    Allyssa Nicole MD, FACOG

## 2019-07-23 NOTE — PROGRESS NOTES
S:  Pt is a 27 y/o now  s/p D&E at 15+6 weeks on 7/3/19 for fetal anomalies including a large omphalocele.  She states that she has been doing well since the procedure-still having some spotting.  During the visit she got very tearful-reassures me that she has been better than she looks today, just emotional being back here, worries that her periods won't be regular again (always regular in the past), will have infertility (took only 4 months to conceive) or miscarriage with her next pregnancy.   She states that she is well established with a therapist and has been seeing her regularly and is starting a grief support group soon.  She does not desire medication at this time.  Her  was present today.  He has also been seeing his therapist.  They are planning to attempt pregnancy again, but they both want to work through their grief before another pregnancy.       O: /81   Pulse 74   Wt 69.9 kg (154 lb)   LMP 03/14/2019   Breastfeeding? No   BMI 22.10 kg/m    gen-pleasant, tearful at times    UPT negative    Chromosomes and CGH still pending  Routine pathology normal    A:  27 y/o  2 weeks s/p D&E at 15+6 weeks 2/2 fetal anomalies    P:  Reviewed her negative UPT today, spotting that may last another 2+ weeks, likely resumption of menses in the next 4-6 weeks.  Discussed that the final genetic results may take another 4 weeks but we will notify her of the results when they are available.  We discussed her above fears and concerns at length as well as her mood.  She states that she will contact us if she feels like her mood is not getting any better or is getting worse.  Discussed waiting until she has at least one normal period before attempting pregnancy again. She was referred to our clinic for her D&C-she will likely return to her primary OB provider for future prenatal care.    Allyssa Nicole MD, FACOG

## 2019-07-30 NOTE — PROGRESS NOTES
Discharge Note    Progress reporting period is from last progress note on 09/07/18 to Sep 21, 2018.    Rach failed to follow up and current status is unknown.  Please see information below for last relevant information on current status.  Patient seen for 4 visits.    SUBJECTIVE  Subjective changes noted by patient:  R knee feels better overall. Running 4x/week for 2 miles. Rates R knee pain 2-3/10 w/ running. C/o R knee pain w/ squatting.  .  Current pain level is 0/10.     Previous pain level was  6/10.   Changes in function:  Yes (See Goal flowsheet attached for changes in current functional level)  Adverse reaction to treatment or activity: None    OBJECTIVE  Changes noted in objective findings: 2-leg squat: mild loss of control w/ L hip deviation and R knee pain.     ASSESSMENT/PLAN  Diagnosis: Impingement syndrome involving fat pad of R knee, Patellofemoral syndrome of R knee   Updated problem list and treatment plan:   Pain - HEP  Decreased function - HEP  Decreased strength - HEP  Impaired muscle performance - HEP  Edema - HEP  Decreased proprioception - HEP  Impaired posture - HEP  STG/LTGs have been met or progress has been made towards goals:  Yes, please see goal flowsheet for most current information  Assessment of Progress: current status is unknown.    Last current status: Pt is progressing as expected   Self Management Plans:  HEP  I have re-evaluated this patient and find that the nature, scope, duration and intensity of the therapy is appropriate for the medical condition of the patient.  Rach continues to require the following intervention to meet STG and LTG's:  HEP.    Recommendations:  Discharge with current home program.  Patient to follow up with MD as needed.    Please refer to the daily flowsheet for treatment today, total treatment time and time spent performing 1:1 timed codes.

## 2019-07-31 LAB — COPATH REPORT: NORMAL

## 2019-08-01 ENCOUNTER — TELEPHONE (OUTPATIENT)
Dept: MATERNAL FETAL MEDICINE | Facility: CLINIC | Age: 29
End: 2019-08-01

## 2019-08-01 NOTE — TELEPHONE ENCOUNTER
8/1/2019    Called Rach to discuss chromosome analysis results from her recent pregnancy loss.  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  This result indicates that no large scale structural or numerical chromosome anomalies were detected.  Unfortunately, this result does not provide an explanation for the physical differences identified in Rach's pregnancy.  It does however, rule out some of the more commonly associated conditions such as trisomy 18, 13, or 21.  We discussed that more indepth analysis via chromosome microarray testing is available to assess for small scale numerical genetic differences.  Rach is highly motivated to gather as much information as possible regarding possible diagnoses in order to clarify risks for future pregnancies, and opts to move forward with array testing at this time.  Genetic counseling will contact cytogenetics to coordinate the next step in testing and will contact Rach to discuss results as they become available.      Rach reports that overall she is doing okay, and was encouraged to reach out to genetic counseling if she or Tim have any questions or concerns moving forward.    Dom Matthews MS, [facility]  Licensed Genetic Counselor  Phone: [phone]  Pager: [phone]

## 2019-08-08 ENCOUNTER — TELEPHONE (OUTPATIENT)
Dept: MATERNAL FETAL MEDICINE | Facility: CLINIC | Age: 29
End: 2019-08-08

## 2019-08-08 LAB — COPATH REPORT: NORMAL

## 2019-08-08 NOTE — TELEPHONE ENCOUNTER
8/8/2019    Called Rach to discuss final genetic results from her recent pregnancy loss.  Her  Tim returned my call (consent to communicate under the media tab).  SNP microarray results are normal, female pattern    ISCN:  arr(1-22,X)x2 No clinically significant region of copy number gain or loss was detected in this microarray analysis.     We discussed that this result does not provide a definitive answer for the differences identified in Rach's pregnancy, but it does rule out genetic aneuploidy conditions.  We discussed that based on this result the recurrence risk for any future pregnancy is likely low, and that early ultrasounds would be available to assess future pregnancies.  Tim had no questions at this time and was encouraged to have Rach contact me if she has any questions after the couple discusses results.      Dom Matthews MS, [facility]  Licensed Genetic Counselor  Phone: [phone]  Pager: [phone]

## 2019-09-16 ENCOUNTER — MYC REFILL (OUTPATIENT)
Dept: FAMILY MEDICINE | Facility: CLINIC | Age: 29
End: 2019-09-16

## 2019-09-16 DIAGNOSIS — B00.9 HERPES SIMPLEX TYPE 2 INFECTION: ICD-10-CM

## 2019-09-16 RX ORDER — VALACYCLOVIR HYDROCHLORIDE 500 MG/1
500 TABLET, FILM COATED ORAL DAILY
Qty: 30 TABLET | Refills: 0 | Status: CANCELLED | OUTPATIENT
Start: 2019-09-16

## 2019-09-25 PROBLEM — G89.29 CHRONIC PAIN OF RIGHT KNEE: Status: RESOLVED | Noted: 2018-06-28 | Resolved: 2019-09-25

## 2019-09-25 PROBLEM — M25.561 CHRONIC PAIN OF RIGHT KNEE: Status: RESOLVED | Noted: 2018-06-28 | Resolved: 2019-09-25

## 2019-09-29 ENCOUNTER — HEALTH MAINTENANCE LETTER (OUTPATIENT)
Age: 29
End: 2019-09-29

## 2019-10-09 ENCOUNTER — OFFICE VISIT (OUTPATIENT)
Dept: OBGYN | Facility: CLINIC | Age: 29
End: 2019-10-09
Attending: OBSTETRICS & GYNECOLOGY
Payer: COMMERCIAL

## 2019-10-09 VITALS
SYSTOLIC BLOOD PRESSURE: 145 MMHG | DIASTOLIC BLOOD PRESSURE: 83 MMHG | WEIGHT: 154 LBS | HEART RATE: 58 BPM | HEIGHT: 70 IN | BODY MASS INDEX: 22.05 KG/M2

## 2019-10-09 DIAGNOSIS — O03.9 MISCARRIAGE: Primary | ICD-10-CM

## 2019-10-09 ASSESSMENT — ANXIETY QUESTIONNAIRES
1. FEELING NERVOUS, ANXIOUS, OR ON EDGE: MORE THAN HALF THE DAYS
5. BEING SO RESTLESS THAT IT IS HARD TO SIT STILL: NOT AT ALL
2. NOT BEING ABLE TO STOP OR CONTROL WORRYING: MORE THAN HALF THE DAYS
GAD7 TOTAL SCORE: 8
6. BECOMING EASILY ANNOYED OR IRRITABLE: SEVERAL DAYS
3. WORRYING TOO MUCH ABOUT DIFFERENT THINGS: MORE THAN HALF THE DAYS
7. FEELING AFRAID AS IF SOMETHING AWFUL MIGHT HAPPEN: NOT AT ALL

## 2019-10-09 ASSESSMENT — PAIN SCALES - GENERAL: PAINLEVEL: NO PAIN (0)

## 2019-10-09 ASSESSMENT — PATIENT HEALTH QUESTIONNAIRE - PHQ9
SUM OF ALL RESPONSES TO PHQ QUESTIONS 1-9: 7
5. POOR APPETITE OR OVEREATING: SEVERAL DAYS

## 2019-10-09 ASSESSMENT — MIFFLIN-ST. JEOR: SCORE: 1513.79

## 2019-10-09 NOTE — LETTER
10/9/2019       RE: Rach Peterson  [address] [place] [address]     Dear Colleague,    Thank you for referring your patient, Rach Peterson, to the [facility] at [facility]. Please see a copy of my visit note below.    [facility]  Gynecology Problem Visit    SUBJECTIVE    Rach Peterson is a 28 year old  who is here for discussion of miscarriage. Rach underwent D&E for fetal indications (omphalocele) at 15 weeks gestation on 7/3/19. Her menses resumed for 2 cycles and then she had a missed period (approximately 19). She checked a pregnancy test which was faintly positive. She began bleeding heavily on Thursday. She is unsure if she passed tissue. She is still currently bleeding though it has become lighter. She rechecked her pregnancy test on  and it was negative. She is appropriately tearful and upset about her 2 failed pregnancies. She is a part of a grief group and fears that she will become like women in her group who've had 9 pregnancy losses. She is experiencing right crampy abdominal pain and is worried that she has an ectopic pregnancy.     She denies headaches, SOB, CP, nausea/vomiting, fevers/chills, changes in bowel function, dysuria, and foul-odor discharge.    PAST MEDICAL HISTORY  Past Medical History:   Diagnosis Date     Syncope and collapse     Cardiology workup with Dr Brady, wandering atrial pacer, no activity restritction and no need for abx prophylaxis (mild pulm flow murmur)       MEDICATIONS  Current Outpatient Medications   Medication     valACYclovir (VALTREX) 500 MG tablet     No current facility-administered medications for this visit.        ALLERGIES  Allergies   Allergen Reactions     No Known Drug Allergies        REVIEW OF SYSTEMS  A 10 point review of systems including Constitutional, Eyes, Respiratory, Cardiovascular, Gastroenterology, Genitourinary,  "Integumentary, Muscularskeletal, and Psychiatric, were all negative, except for pertinent positives noted in the above HPI.    OBJECTIVE  BP (!) 145/83   Pulse 58   Ht 1.778 m (5' 10\")   Wt 69.9 kg (154 lb)   LMP 2019   BMI 22.10 kg/m     Body mass index is 22.1 kg/m .    General: Alert, without distress   HEENT: normocephalic, without obvious abnormality Cardiovascular: Well perfused; no LE edema   Lungs: Normal respiratory effort and rate   Pelvic: deferred today   Extremities: normal    ASSESSMENT  Rach Peterson is a 28 year old  who is here with an early pregnancy failure. She is appropriately upset and anxious about her failed pregnancies.      PLAN  -We discussed with Rach that her clinical history is very consistent with a resolved first trimester pregnancy loss. We discussed the most likely cause of early pregnancy failure (aneuploidy), and that having one miscarriage does not predispose her to future miscarriage. I am unconcerned that she has an ectopic today, as her home UPT was negative. However, I think that a TVUS will provide closure and reassurance. She can schedule this at her earliest convenience.  -We extensively counseled Rach that her two failed pregnancies are most likely unrelated with regard to cause. Her chromosomal test after the D&E was normal, and points to a structural cause. The current miscarriage is likely due to the all-or-nothing phenomenon during the first trimester. I do not consider her RPL, but would consider such a workup (which includes fetal/embryonic and paternal chromosomal testing, uterine cavity evaluation, and clotting factor evaluation) if she experiences another miscarriage  -We recommended that she continue taking prenatal vitamins. Recommended that Rach and her partner TTC after her menses resume. Recommended that after missed LMP and positive UPT at home, we could provide reassurance at [facility] clinic with an early dating US (6wks); if " she unfortunately experiences another loss, then early entry to care could help us provide her with miscarriage management that would allow collection of the pregnancy tissue.   -while quite tearful today, she feels she has adequate support with her  and pregnancy loss support group.    Staffed with Dr. David Arellano MD  OB/GYN PGY-1  10/09/19 5:19 PM    OBGYN Attending Addendum    Rach Peterson was seen by the resident, Dr. Arellano, in Continuity of Care Clinic. I, Nikkie Hernandez, reviewed the history and exam. I have edited the note accordingly, and the assessment and plan were made jointly between myself and Dr. Arellano.    Total visit time was 40 minutes with >50% time spent in counseling and coordination of care for early pregnancy loss.    Nikkie Hernandez MD, MSCI    [facility]/OBGYN

## 2019-10-09 NOTE — NURSING NOTE
Had a positive pregnancy test Sunday 9-  Then this weekend  She had cramping and bleeding like a regular period  Bright red.   Had questions to follow up

## 2019-10-09 NOTE — PROGRESS NOTES
"[facility]  Gynecology Problem Visit    SUBJECTIVE    Rach Peterson is a 28 year old  who is here for discussion of miscarriage. Rach underwent D&E for fetal indications (omphalocele) at 15 weeks gestation on 7/3/19. Her menses resumed for 2 cycles and then she had a missed period (approximately 19). She checked a pregnancy test which was faintly positive. She began bleeding heavily on Thursday. She is unsure if she passed tissue. She is still currently bleeding though it has become lighter. She rechecked her pregnancy test on  and it was negative. She is appropriately tearful and upset about her 2 failed pregnancies. She is a part of a grief group and fears that she will become like women in her group who've had 9 pregnancy losses. She is experiencing right crampy abdominal pain and is worried that she has an ectopic pregnancy.     She denies headaches, SOB, CP, nausea/vomiting, fevers/chills, changes in bowel function, dysuria, and foul-odor discharge.    PAST MEDICAL HISTORY  Past Medical History:   Diagnosis Date     Syncope and collapse     Cardiology workup with Dr Brady, wandering atrial pacer, no activity restritction and no need for abx prophylaxis (mild pulm flow murmur)       MEDICATIONS  Current Outpatient Medications   Medication     valACYclovir (VALTREX) 500 MG tablet     No current facility-administered medications for this visit.        ALLERGIES  Allergies   Allergen Reactions     No Known Drug Allergies        REVIEW OF SYSTEMS  A 10 point review of systems including Constitutional, Eyes, Respiratory, Cardiovascular, Gastroenterology, Genitourinary, Integumentary, Muscularskeletal, and Psychiatric, were all negative, except for pertinent positives noted in the above HPI.    OBJECTIVE  BP (!) 145/83   Pulse 58   Ht 1.778 m (5' 10\")   Wt 69.9 kg (154 lb)   LMP 2019   BMI 22.10 kg/m    Body mass index is 22.1 kg/m .    General: Alert, without " distress   HEENT: normocephalic, without obvious abnormality Cardiovascular: Well perfused; no LE edema   Lungs: Normal respiratory effort and rate   Pelvic: deferred today   Extremities: normal    ASSESSMENT  Rach Peterson is a 28 year old  who is here with an early pregnancy failure. She is appropriately upset and anxious about her failed pregnancies.      PLAN  -We discussed with Rach that her clinical history is very consistent with a resolved first trimester pregnancy loss. We discussed the most likely cause of early pregnancy failure (aneuploidy), and that having one miscarriage does not predispose her to future miscarriage. I am unconcerned that she has an ectopic today, as her home UPT was negative. However, I think that a TVUS will provide closure and reassurance. She can schedule this at her earliest convenience.  -We extensively counseled Rach that her two failed pregnancies are most likely unrelated with regard to cause. Her chromosomal test after the D&E was normal, and points to a structural cause. The current miscarriage is likely due to the all-or-nothing phenomenon during the first trimester. I do not consider her RPL, but would consider such a workup (which includes fetal/embryonic and paternal chromosomal testing, uterine cavity evaluation, and clotting factor evaluation) if she experiences another miscarriage  -We recommended that she continue taking prenatal vitamins. Recommended that Rach and her partner TTC after her menses resume. Recommended that after missed LMP and positive UPT at home, we could provide reassurance at [facility] clinic with an early dating US (6wks); if she unfortunately experiences another loss, then early entry to care could help us provide her with miscarriage management that would allow collection of the pregnancy tissue.   -while quite tearful today, she feels she has adequate support with her  and pregnancy loss support group.    Staffed with  Dr. David Arellano MD  OB/GYN PGY-1  10/09/19 5:19 PM    OBGYN Attending Addendum    I, Nikkie Hernandez, have seen and examined Rach Peterson in conjunction with the resident, Dr. Arellano. I have reviewed and edited the note accordingly, and the assessment and plan were made jointly between myself and Dr. Arellano.    Total visit time was 40 minutes with >50% time spent in counseling and coordination of care for early pregnancy loss.    Nikkie Hernandez MD, MSCI    [facility]/OBGYN

## 2019-10-10 ASSESSMENT — ANXIETY QUESTIONNAIRES: GAD7 TOTAL SCORE: 8

## 2019-10-11 ENCOUNTER — MYC REFILL (OUTPATIENT)
Dept: FAMILY MEDICINE | Facility: CLINIC | Age: 29
End: 2019-10-11

## 2019-10-11 DIAGNOSIS — B00.9 HERPES SIMPLEX TYPE 2 INFECTION: ICD-10-CM

## 2019-10-14 NOTE — TELEPHONE ENCOUNTER
"Valtrex 500 mg  Last Written Prescription Date:  9/6/19  Last Fill Quantity: 30,  # refills: 0   Last office visit: 4/26/2019 with prescribing provider:  KALIA   Future Office Visit:    Requested Prescriptions   Pending Prescriptions Disp Refills     valACYclovir (VALTREX) 500 MG tablet 30 tablet 0     Sig: Take 1 tablet (500 mg) by mouth daily       Antivirals for Herpes Protocol Failed - 10/11/2019  9:21 AM        Failed - Normal serum creatinine on file in past 12 months     No lab results found.          Passed - Patient is age 12 or older        Passed - Recent (12 mo) or future (30 days) visit within the authorizing provider's specialty     Patient has had an office visit with the authorizing provider or a provider within the authorizing providers department within the previous 12 mos or has a future within next 30 days. See \"Patient Info\" tab in inbasket, or \"Choose Columns\" in Meds & Orders section of the refill encounter.              Passed - Medication is active on med list        Routing refill request to provider for review/approval because:  Protocol failed: ANNABEL GONZALEZ RN     "

## 2019-10-16 ENCOUNTER — MYC REFILL (OUTPATIENT)
Dept: FAMILY MEDICINE | Facility: CLINIC | Age: 29
End: 2019-10-16

## 2019-10-16 ENCOUNTER — ANCILLARY PROCEDURE (OUTPATIENT)
Dept: ULTRASOUND IMAGING | Facility: CLINIC | Age: 29
End: 2019-10-16
Attending: OBSTETRICS & GYNECOLOGY
Payer: COMMERCIAL

## 2019-10-16 DIAGNOSIS — B00.9 HERPES SIMPLEX TYPE 2 INFECTION: ICD-10-CM

## 2019-10-16 DIAGNOSIS — O03.9 MISCARRIAGE: ICD-10-CM

## 2019-10-16 PROCEDURE — 76830 TRANSVAGINAL US NON-OB: CPT

## 2019-10-16 RX ORDER — VALACYCLOVIR HYDROCHLORIDE 500 MG/1
500 TABLET, FILM COATED ORAL DAILY
Qty: 30 TABLET | Refills: 0 | Status: CANCELLED | OUTPATIENT
Start: 2019-10-16

## 2019-10-16 RX ORDER — VALACYCLOVIR HYDROCHLORIDE 500 MG/1
500 TABLET, FILM COATED ORAL DAILY
Qty: 30 TABLET | Refills: 0 | Status: SHIPPED | OUTPATIENT
Start: 2019-10-16 | End: 2019-10-17

## 2019-10-18 RX ORDER — VALACYCLOVIR HYDROCHLORIDE 500 MG/1
500 TABLET, FILM COATED ORAL DAILY
Qty: 90 TABLET | Refills: 3 | Status: SHIPPED | OUTPATIENT
Start: 2019-10-18 | End: 2020-08-25

## 2019-10-22 DIAGNOSIS — B00.9 HERPES SIMPLEX TYPE 2 INFECTION: ICD-10-CM

## 2019-10-22 RX ORDER — VALACYCLOVIR HYDROCHLORIDE 500 MG/1
500 TABLET, FILM COATED ORAL DAILY
Qty: 90 TABLET | Refills: 3 | Status: CANCELLED | OUTPATIENT
Start: 2019-10-22

## 2019-10-22 NOTE — TELEPHONE ENCOUNTER
"Requested Prescriptions   Pending Prescriptions Disp Refills     valACYclovir (VALTREX) 500 MG tablet 90 tablet 3     Sig: Take 1 tablet (500 mg) by mouth daily   Last Written Prescription Date:  10/18/19  Last Fill Quantity: 90,  # refills: 3   Last office visit: 4/26/2019 with prescribing provider:  Claudine Garcia APRN CNP   Future Office Visit:        Antivirals for Herpes Protocol Failed - 10/22/2019  8:32 AM        Failed - Normal serum creatinine on file in past 12 months     No lab results found.          Passed - Patient is age 12 or older        Passed - Recent (12 mo) or future (30 days) visit within the authorizing provider's specialty     Patient has had an office visit with the authorizing provider or a provider within the authorizing providers department within the previous 12 mos or has a future within next 30 days. See \"Patient Info\" tab in inbasket, or \"Choose Columns\" in Meds & Orders section of the refill encounter.              Passed - Medication is active on med list         "

## 2019-11-18 ENCOUNTER — TELEPHONE (OUTPATIENT)
Dept: MATERNAL FETAL MEDICINE | Facility: CLINIC | Age: 29
End: 2019-11-18

## 2019-11-18 NOTE — TELEPHONE ENCOUNTER
11/18/2019    Rach's  Tim called and left a voicemail asking for a return call.  A consent to communicate with Tim was completed during Rach's last pregnancy as a part of her ongoing care with maternal fetal medicine.  Tim reports that Rach is newly pregnant, approximately 7 weeks along, and that they are both anxious to discuss appropriate care recommendations and recurrence risks for omphalocele.    I returned Tim's call and left a brief message with the following information.  Genetic counseling would be happy to meet with the family to discuss care plans and recurrence risks if that would be helpful for the family.  I encouraged Tim to contact me or have Rach contact me to discuss how best to arrange that care if desired.  Also, I briefly reviewed our previous conversations that the recurrence risks for the future pregnancy are considered low, because the birth defect was isolated and not attributed to a chromosome abnormality, and that early ultrasound surveillance would be one way to assess for recurrence in the current pregnancy.     Tim was encouraged to contact me directly or have Rach call me if that is preferred.     Dom Matthews MS, [facility]  Licensed Genetic Counselor  Phone: [phone]  Pager: [phone]

## 2019-11-21 NOTE — TELEPHONE ENCOUNTER
11/21/2019    Rach's  Tim called with several follow up questions and we discussed the following topics.  A consent to communicate with Tim is completed and scanned into Rach's medical record under the media tab.     We reviewed that Shauna's pregnancy this summer was affected with a physical difference called an omphalocele.  Genetic testing for that pregnancy was negative, and with no other ultrasound findings this is presumed to be a sporadic occurrence.  We discussed that the recurrence risk for this pregnancy is thought to be low, but not zero.  Tim reports that he and Rach are experience more anxiety for this pregnancy than they were at this point in their last pregnancy, but they also feel that this is a rational response to their experiences.  I assured Tim that it is quite normal for families that have gone through experiences similar to theirs to be apprehensive or anxious in future pregnancies.      We discussed that ultrasound surveillance is recommended to assess for birth defects such as omphalocele, but that this specific finding cannot be assessed on very early ultrasounds such as the initial scan that Rach already has scheduled.  We discussed that this finding cannot always be definitively identified or ruled out until later in pregnancy, but that their primary clinic will help them arrange appropriate ultrasounds with [facility], with the first likely occurring at the end of the first trimester.  We discussed that genetic counseling would likely be included in that appointment to review this information as needed and discuss options for screening for sporadic genetic conditions such as Down syndrome.     Dom Matthews MS, [facility]  Licensed Genetic Counselor  Phone: [phone]  Pager: [phone]

## 2019-12-03 NOTE — PROGRESS NOTES
"[facility] OB Intake note  Subjective   29 year old woman presents to clinic for initiation of OB care with her  Tim. Patient's last menstrual period was 10/04/2019.  at 8w5d by Estimated Date of Delivery: Jul 10, 2020 based on LMP. Reviewed dating ultrasound. Pregnancy is planned. They are very anxious due to their hx of a pregnancy with \"baby Loon\" who had a diagnosed omphalocele and ended with a D&C and then a subsequent SAB. They are pleased with the US today but still visibly anxious.       Symptoms since LMP include breast tenderness and fatigue. Patient has tried these relief measures: increased rest and increased fluids.    - Genetic/Infection questionnaire completed, risks include past pregnancy with omphacele. Pt  does not have a recent known exposure to Parvo or CMV so IgG/IgM testing WILL NOT be ordered.   Have you traveled during the pregnancy?No  Have your sexual partner(s) travelled during the pregnancy?No    - Current Medications    Current Outpatient Medications   Medication Sig Dispense Refill     CALCIUM PO        Prenatal Vit-Fe Fumarate-FA (PRENATAL VITAMIN) 27-0.8 MG TABS        valACYclovir (VALTREX) 500 MG tablet Take 1 tablet (500 mg) by mouth daily 90 tablet 3         - Co-morbids    Past Medical History:   Diagnosis Date     Herpes simplex without mention of complication 2011     Syncope and collapse     Cardiology workup with Dr Brady, wandering atrial pacer, no activity restritction and no need for abx prophylaxis (mild pulm flow murmur)     - Risk for GDM -  does not  have Personal history of GDM, BMI>30, h/o prediabetes/glucose intolerance, first degree relative with GDM or DM , hx of prior macrosomia in ,  WILL NOT have an early GCT and possible Hgb A1C    - High Risk for Pre E- meets one of following criteria The patient does not h/o Pre Eclampsia, Current multi fetal gestation, Pre Gestational Diabetes (Type 1 or Type 2), chronic hypertension, renal disease, " Autoimmune disease (systematic lupus erythematosus, antiphospholipid syndrome) so WILL NOT start low dose aspirin (81mg) starting between 12 and 28 weeks to prevent early onset preeclampsia.    - Moderate risk - meets more than one of several moderate risk facrtors for Pre E - Nulliparity, Obesity (body mass index >30 kg/m2),Family history of preeclampsia (mother or sister), Sociodemographic characteristics ( race, low socioeconomic status), Age ?35 years, Personal history factors (e.g., low birthweight or small for gestational age, previous adverse pregnancy outcome, >10-year pregnancy interval)  so WILL NOT consider starting low dose aspirin (81mg) starting between 12 and 28 weeks to prevent early onset preeclampsia.    - The patient  does not have a history of spontaneous  birth so  WILL NOT consider progesterone starting at 16-20 weeks and/or serial transvaginal cervical length ultrasounds from 16-24 weeks.     -The patient does not have a history of immunosuppresion or HIV so Toxoplasma IgG/IgM WILL NOT be ordered.     PERSONAL/SOCIAL HISTORY   lives with their spouse and cat.  Employment: Full time. Her job involves light activity - she is an  and he is an . 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   History of anxiety or depression  Yes - if Yes, sees a therapist for this and feels like it is well controlled.   Additional items: Denies past or present domestic violence, sexual and psychological abuse.    Objective  -VS: reviewed and within normal limits   -General appearance: no acute distress, patient is comfortable   NEUROLOGICAL/PSYCHIATRIC   - Orientated x3,   -Mood and affect: : normal     Assessment/Plan  Rach was seen today for prenatal care.    Diagnoses and all orders for this visit:    Supervision of other high risk pregnancies, first trimester  -     25- OH-Vitamin D  -     ABO/Rh Type and Screen  -     CBC with Platelets  Differential  -     Hepatitis B Surface Antigen  -     HIV Antigen Antibody Combo  -     Rubella Antibody IgG Quantitative  -     Treponema Abs w Reflex to RPR and Titer  -     Urine Culture Aerobic Bacterial  -     Hepatitis C antibody  -     Hepatitis B Surface Antibody  -     [facility] REFERRAL-PREGNANCY    29 year old  8w5d weeks of pregnancy with JENNYFER of Jul 10, 2020 by LMP of Patient's last menstrual period was 10/04/2019. Ultrasound confirms.   Outpatient Encounter Medications as of 2019   Medication Sig Dispense Refill     CALCIUM PO        Prenatal Vit-Fe Fumarate-FA (PRENATAL VITAMIN) 27-0.8 MG TABS        valACYclovir (VALTREX) 500 MG tablet Take 1 tablet (500 mg) by mouth daily 90 tablet 3     No facility-administered encounter medications on file as of 2019.       Orders Placed This Encounter   Procedures     25- OH-Vitamin D     CBC with Platelets Differential     Hepatitis B Surface Antigen     HIV Antigen Antibody Combo     Rubella Antibody IgG Quantitative     Treponema Abs w Reflex to RPR and Titer     Hepatitis C antibody     Hepatitis B Surface Antibody     [facility] REFERRAL-PREGNANCY     ABO/Rh Type and Screen     Orders Placed This Encounter   Procedures     25- OH-Vitamin D     CBC with Platelets Differential     Hepatitis B Surface Antigen     HIV Antigen Antibody Combo     Rubella Antibody IgG Quantitative     Treponema Abs w Reflex to RPR and Titer     Hepatitis C antibody     Hepatitis B Surface Antibody     [facility] REFERRAL-PREGNANCY     ABO/Rh Type and Screen     - Oriented to Practice, types of care, and how to reach a provider.  Pt prefers MDs (loved Dr. Hernandez).   - Patient received 1st trimester new OB education packet complete with aide of [employer] booklet including information on genetic screening test options.    - Patient desires 1st trimester screening which was ordered.  - Educational handout on the prevention of infections  diseases during pregnancy provided.  - Patient was encouraged to start prenatal vitamins as tolerated.    - Patient was sent to lab for routine OB labs including hep C.   - Pregnancy concerns to be addressed by provider at new OB exam include: need PAP? How was first tri screen.    Pt to RTO for NOB visit in 2 weeks and prn if questions or concerns    Monserrat Madison CNM

## 2019-12-04 ENCOUNTER — OFFICE VISIT (OUTPATIENT)
Dept: OBGYN | Facility: CLINIC | Age: 29
End: 2019-12-04
Attending: ADVANCED PRACTICE MIDWIFE
Payer: COMMERCIAL

## 2019-12-04 ENCOUNTER — ANCILLARY PROCEDURE (OUTPATIENT)
Dept: ULTRASOUND IMAGING | Facility: CLINIC | Age: 29
End: 2019-12-04
Attending: ADVANCED PRACTICE MIDWIFE
Payer: COMMERCIAL

## 2019-12-04 VITALS
BODY MASS INDEX: 22.1 KG/M2 | SYSTOLIC BLOOD PRESSURE: 126 MMHG | DIASTOLIC BLOOD PRESSURE: 77 MMHG | HEART RATE: 74 BPM | HEIGHT: 70 IN

## 2019-12-04 DIAGNOSIS — O09.891 SUPERVISION OF OTHER HIGH RISK PREGNANCIES, FIRST TRIMESTER: Primary | ICD-10-CM

## 2019-12-04 DIAGNOSIS — Z34.91 ENCOUNTER FOR SUPERVISION OF NORMAL PREGNANCY IN FIRST TRIMESTER, UNSPECIFIED GRAVIDITY: ICD-10-CM

## 2019-12-04 LAB
ABO + RH BLD: NORMAL
ABO + RH BLD: NORMAL
BASOPHILS # BLD AUTO: 0 10E9/L (ref 0–0.2)
BASOPHILS NFR BLD AUTO: 0.3 %
BLD GP AB SCN SERPL QL: NORMAL
BLOOD BANK CMNT PATIENT-IMP: NORMAL
DIFFERENTIAL METHOD BLD: NORMAL
EOSINOPHIL # BLD AUTO: 0.1 10E9/L (ref 0–0.7)
EOSINOPHIL NFR BLD AUTO: 1.4 %
ERYTHROCYTE [DISTWIDTH] IN BLOOD BY AUTOMATED COUNT: 12.4 % (ref 10–15)
HCT VFR BLD AUTO: 39 % (ref 35–47)
HGB BLD-MCNC: 13.1 G/DL (ref 11.7–15.7)
IMM GRANULOCYTES # BLD: 0 10E9/L (ref 0–0.4)
IMM GRANULOCYTES NFR BLD: 0.2 %
LYMPHOCYTES # BLD AUTO: 2.2 10E9/L (ref 0.8–5.3)
LYMPHOCYTES NFR BLD AUTO: 25.3 %
MCH RBC QN AUTO: 31 PG (ref 26.5–33)
MCHC RBC AUTO-ENTMCNC: 33.6 G/DL (ref 31.5–36.5)
MCV RBC AUTO: 92 FL (ref 78–100)
MONOCYTES # BLD AUTO: 0.9 10E9/L (ref 0–1.3)
MONOCYTES NFR BLD AUTO: 10 %
NEUTROPHILS # BLD AUTO: 5.5 10E9/L (ref 1.6–8.3)
NEUTROPHILS NFR BLD AUTO: 62.8 %
NRBC # BLD AUTO: 0 10*3/UL
NRBC BLD AUTO-RTO: 0 /100
PLATELET # BLD AUTO: 254 10E9/L (ref 150–450)
RBC # BLD AUTO: 4.23 10E12/L (ref 3.8–5.2)
SPECIMEN EXP DATE BLD: NORMAL
WBC # BLD AUTO: 8.7 10E9/L (ref 4–11)

## 2019-12-04 PROCEDURE — G0463 HOSPITAL OUTPT CLINIC VISIT: HCPCS

## 2019-12-04 PROCEDURE — 87389 HIV-1 AG W/HIV-1&-2 AB AG IA: CPT | Performed by: ADVANCED PRACTICE MIDWIFE

## 2019-12-04 PROCEDURE — 87086 URINE CULTURE/COLONY COUNT: CPT | Performed by: ADVANCED PRACTICE MIDWIFE

## 2019-12-04 PROCEDURE — 82306 VITAMIN D 25 HYDROXY: CPT | Performed by: ADVANCED PRACTICE MIDWIFE

## 2019-12-04 PROCEDURE — 86706 HEP B SURFACE ANTIBODY: CPT | Performed by: ADVANCED PRACTICE MIDWIFE

## 2019-12-04 PROCEDURE — 86901 BLOOD TYPING SEROLOGIC RH(D): CPT | Performed by: ADVANCED PRACTICE MIDWIFE

## 2019-12-04 PROCEDURE — 87340 HEPATITIS B SURFACE AG IA: CPT | Performed by: ADVANCED PRACTICE MIDWIFE

## 2019-12-04 PROCEDURE — 76801 OB US < 14 WKS SINGLE FETUS: CPT

## 2019-12-04 PROCEDURE — 86780 TREPONEMA PALLIDUM: CPT | Performed by: ADVANCED PRACTICE MIDWIFE

## 2019-12-04 PROCEDURE — 86762 RUBELLA ANTIBODY: CPT | Performed by: ADVANCED PRACTICE MIDWIFE

## 2019-12-04 PROCEDURE — 86850 RBC ANTIBODY SCREEN: CPT | Performed by: ADVANCED PRACTICE MIDWIFE

## 2019-12-04 PROCEDURE — 36415 COLL VENOUS BLD VENIPUNCTURE: CPT | Performed by: ADVANCED PRACTICE MIDWIFE

## 2019-12-04 PROCEDURE — 86900 BLOOD TYPING SEROLOGIC ABO: CPT | Performed by: ADVANCED PRACTICE MIDWIFE

## 2019-12-04 PROCEDURE — 86803 HEPATITIS C AB TEST: CPT | Performed by: ADVANCED PRACTICE MIDWIFE

## 2019-12-04 PROCEDURE — 85025 COMPLETE CBC W/AUTO DIFF WBC: CPT | Performed by: ADVANCED PRACTICE MIDWIFE

## 2019-12-04 RX ORDER — PNV NO.95/FERROUS FUM/FOLIC AC 28MG-0.8MG
TABLET ORAL
COMMUNITY
End: 2020-08-25

## 2019-12-04 ASSESSMENT — PAIN SCALES - GENERAL: PAINLEVEL: NO PAIN (0)

## 2019-12-04 NOTE — LETTER
"2019       RE: Rach Peterson  [address] [place] [address]     Dear Colleague,    Thank you for referring your patient, Rach Peterson, to the [facility] at [facility]. Please see a copy of my visit note below.    [facility] OB Intake note  Subjective   29 year old woman presents to clinic for initiation of OB care with her  Tim. Patient's last menstrual period was 10/04/2019.  at 8w5d by Estimated Date of Delivery: Jul 10, 2020 based on LMP. Reviewed dating ultrasound. Pregnancy is planned. They are very anxious due to their hx of a pregnancy with \"baby Loon\" who had a diagnosed omphalocele and ended with a D&C and then a subsequent SAB. They are pleased with the US today but still visibly anxious.       Symptoms since LMP include breast tenderness and fatigue. Patient has tried these relief measures: increased rest and increased fluids.    - Genetic/Infection questionnaire completed, risks include past pregnancy with omphacele. Pt  does not have a recent known exposure to Parvo or CMV so IgG/IgM testing WILL NOT be ordered.   Have you traveled during the pregnancy?No  Have your sexual partner(s) travelled during the pregnancy?No    - Current Medications    Current Outpatient Medications   Medication Sig Dispense Refill     CALCIUM PO        Prenatal Vit-Fe Fumarate-FA (PRENATAL VITAMIN) 27-0.8 MG TABS        valACYclovir (VALTREX) 500 MG tablet Take 1 tablet (500 mg) by mouth daily 90 tablet 3         - Co-morbids    Past Medical History:   Diagnosis Date     Herpes simplex without mention of complication 2011     Syncope and collapse     Cardiology workup with Dr Brady, wandering atrial pacer, no activity restritction and no need for abx prophylaxis (mild pulm flow murmur)     - Risk for GDM -  does not  have Personal history of GDM, BMI>30, h/o prediabetes/glucose intolerance, first degree relative with GDM " or DM , hx of prior macrosomia in ,  WILL NOT have an early GCT and possible Hgb A1C    - High Risk for Pre E- meets one of following criteria The patient does not h/o Pre Eclampsia, Current multi fetal gestation, Pre Gestational Diabetes (Type 1 or Type 2), chronic hypertension, renal disease, Autoimmune disease (systematic lupus erythematosus, antiphospholipid syndrome) so WILL NOT start low dose aspirin (81mg) starting between 12 and 28 weeks to prevent early onset preeclampsia.    - Moderate risk - meets more than one of several moderate risk facrtors for Pre E - Nulliparity, Obesity (body mass index >30 kg/m2),Family history of preeclampsia (mother or sister), Sociodemographic characteristics ( race, low socioeconomic status), Age ?35 years, Personal history factors (e.g., low birthweight or small for gestational age, previous adverse pregnancy outcome, >10-year pregnancy interval)  so WILL NOT consider starting low dose aspirin (81mg) starting between 12 and 28 weeks to prevent early onset preeclampsia.    - The patient  does not have a history of spontaneous  birth so  WILL NOT consider progesterone starting at 16-20 weeks and/or serial transvaginal cervical length ultrasounds from 16-24 weeks.     -The patient does not have a history of immunosuppresion or HIV so Toxoplasma IgG/IgM WILL NOT be ordered.     PERSONAL/SOCIAL HISTORY   lives with their spouse and cat.  Employment: Full time. Her job involves light activity - she is an  and he is an . 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   History of anxiety or depression  Yes - if Yes, sees a therapist for this and feels like it is well controlled.   Additional items: Denies past or present domestic violence, sexual and psychological abuse.    Objective  -VS: reviewed and within normal limits   -General appearance: no acute distress, patient is comfortable   NEUROLOGICAL/PSYCHIATRIC   -  Orientated x3,   -Mood and affect: : normal     Assessment/Plan  Rach was seen today for prenatal care.    Diagnoses and all orders for this visit:    Supervision of other high risk pregnancies, first trimester  -     25- OH-Vitamin D  -     ABO/Rh Type and Screen  -     CBC with Platelets Differential  -     Hepatitis B Surface Antigen  -     HIV Antigen Antibody Combo  -     Rubella Antibody IgG Quantitative  -     Treponema Abs w Reflex to RPR and Titer  -     Urine Culture Aerobic Bacterial  -     Hepatitis C antibody  -     Hepatitis B Surface Antibody  -     [facility] REFERRAL-PREGNANCY    29 year old  8w5d weeks of pregnancy with JENNYFER of Jul 10, 2020 by LMP of Patient's last menstrual period was 10/04/2019. Ultrasound confirms.   Outpatient Encounter Medications as of 2019   Medication Sig Dispense Refill     CALCIUM PO        Prenatal Vit-Fe Fumarate-FA (PRENATAL VITAMIN) 27-0.8 MG TABS        valACYclovir (VALTREX) 500 MG tablet Take 1 tablet (500 mg) by mouth daily 90 tablet 3     No facility-administered encounter medications on file as of 2019.       Orders Placed This Encounter   Procedures     25- OH-Vitamin D     CBC with Platelets Differential     Hepatitis B Surface Antigen     HIV Antigen Antibody Combo     Rubella Antibody IgG Quantitative     Treponema Abs w Reflex to RPR and Titer     Hepatitis C antibody     Hepatitis B Surface Antibody     [facility] REFERRAL-PREGNANCY     ABO/Rh Type and Screen     Orders Placed This Encounter   Procedures     25- OH-Vitamin D     CBC with Platelets Differential     Hepatitis B Surface Antigen     HIV Antigen Antibody Combo     Rubella Antibody IgG Quantitative     Treponema Abs w Reflex to RPR and Titer     Hepatitis C antibody     Hepatitis B Surface Antibody     [facility] CTR REFERRAL-PREGNANCY     ABO/Rh Type and Screen     - Oriented to Practice, types of care, and how to reach a provider.  Pt prefers MDs (loved   Dvaid).   - Patient received 1st trimester new OB education packet complete with aide of [employer] Expect[employer] Family booklet including information on genetic screening test options.    - Patient desires 1st trimester screening which was ordered.  - Educational handout on the prevention of infections diseases during pregnancy provided.  - Patient was encouraged to start prenatal vitamins as tolerated.    - Patient was sent to lab for routine OB labs including hep C.   - Pregnancy concerns to be addressed by provider at new OB exam include: need PAP? How was first tri screen.    Pt to RTO for NOB visit in 2 weeks and prn if questions or concerns    Monserrat Madison CNM

## 2019-12-05 LAB
BACTERIA SPEC CULT: NO GROWTH
DEPRECATED CALCIDIOL+CALCIFEROL SERPL-MC: 36 UG/L (ref 20–75)
HBV SURFACE AB SERPL IA-ACNC: 79.84 M[IU]/ML
HBV SURFACE AG SERPL QL IA: NONREACTIVE
HCV AB SERPL QL IA: NONREACTIVE
HIV 1+2 AB+HIV1 P24 AG SERPL QL IA: NONREACTIVE
Lab: NORMAL
RUBV IGG SERPL IA-ACNC: 15 IU/ML
SPECIMEN SOURCE: NORMAL
T PALLIDUM AB SER QL: NONREACTIVE

## 2019-12-10 ENCOUNTER — ANCILLARY PROCEDURE (OUTPATIENT)
Dept: ULTRASOUND IMAGING | Facility: CLINIC | Age: 29
End: 2019-12-10
Attending: ADVANCED PRACTICE MIDWIFE
Payer: COMMERCIAL

## 2019-12-10 ENCOUNTER — OFFICE VISIT (OUTPATIENT)
Dept: OBGYN | Facility: CLINIC | Age: 29
End: 2019-12-10
Attending: ADVANCED PRACTICE MIDWIFE
Payer: COMMERCIAL

## 2019-12-10 VITALS
WEIGHT: 157 LBS | HEART RATE: 80 BPM | SYSTOLIC BLOOD PRESSURE: 123 MMHG | BODY MASS INDEX: 22.48 KG/M2 | DIASTOLIC BLOOD PRESSURE: 73 MMHG | HEIGHT: 70 IN

## 2019-12-10 DIAGNOSIS — O09.891 SUPERVISION OF OTHER HIGH RISK PREGNANCIES, FIRST TRIMESTER: ICD-10-CM

## 2019-12-10 DIAGNOSIS — O09.891 SUPERVISION OF OTHER HIGH RISK PREGNANCIES, FIRST TRIMESTER: Primary | ICD-10-CM

## 2019-12-10 PROCEDURE — G0463 HOSPITAL OUTPT CLINIC VISIT: HCPCS

## 2019-12-10 PROCEDURE — 76801 OB US < 14 WKS SINGLE FETUS: CPT

## 2019-12-10 ASSESSMENT — MIFFLIN-ST. JEOR: SCORE: 1522.4

## 2019-12-10 NOTE — PROGRESS NOTES
"Subjective:      29 year old  at 9w4d presents for a problem prenatal visit.        Patient concerns: Here today with her . Both nervous d/t hx of loss. Had dating ultrasound on  that was WNL. Pt notes that she had one episode of spotting on . Has had no spotting or bleeding since this time. Denies cramping/pain. Spoke with on call CNM this weekend who recommended appointment.     Pt hoping to feel reassured by hearing heart tones today.     Objective:  Vitals:    12/10/19 0910   BP: 123/73   BP Location: Left arm   Patient Position: Chair   Pulse: 80   Weight: 71.2 kg (157 lb)   Height: 1.778 m (5' 10\")       See OB flowsheet    Unable to auscultate FHTs with doppler. Audible fetal movement.     Assessment/Plan     Encounter Diagnosis   Name Primary?     Supervision of other high risk pregnancies, first trimester Yes     Orders Placed This Encounter   Procedures     US OB <14 Weeks w Transvaginal Single     - Viability ultrasound scheduled for this morning.  - Will schedule NOB in approx 1-2 weeks. Needs GC/CT at that time.  - 1st trimester screening scheduled for 1/3/2020.    Continue scheduled prenatal care, RTC in 1-2 weeks and prn if questions or concerns.      GUANAKO Agrawal CNM     Addendum:  Ultrasound performed. FHTS 171bpm.     Measurements                 CRL = 27.3 mm = 9 4/7 weeks  EGA.   JENNYFER = 7/10/20.                 Fetal anatomy appears normal for gestational age.                 Fetal/Fetal Cardiac Activity: Present.  FHR = 171bpm.                 Implantation: Intrauterine.                 Cervix = 5.9 cm                 Maternal structures appear normal with a left ovarian cyst measuring 1.7 x 1.8 x 1.7cm.     Impression: IUP with JENNYFER; 7/10/2020    GUANAKO Agrawal CNM   "

## 2019-12-10 NOTE — LETTER
"12/10/2019       RE: Rach Peterson  [address] [place] [address]     Dear Colleague,    Thank you for referring your patient, Rach Peterson, to the [facility] at [facility]. Please see a copy of my visit note below.    Subjective:      29 year old  at 9w4d presents for a problem prenatal visit.        Patient concerns: Here today with her . Both nervous d/t hx of loss. Had dating ultrasound on  that was WNL. Pt notes that she had one episode of spotting on . Has had no spotting or bleeding since this time. Denies cramping/pain. Spoke with on call CNM this weekend who recommended appointment.     Pt hoping to feel reassured by hearing heart tones today.     Objective:  Vitals:    12/10/19 0910   BP: 123/73   BP Location: Left arm   Patient Position: Chair   Pulse: 80   Weight: 71.2 kg (157 lb)   Height: 1.778 m (5' 10\")       See OB flowsheet    Unable to auscultate FHTs with doppler. Audible fetal movement.     Assessment/Plan     Encounter Diagnosis   Name Primary?     Supervision of other high risk pregnancies, first trimester Yes     Orders Placed This Encounter   Procedures     US OB <14 Weeks w Transvaginal Single     - Viability ultrasound scheduled for this morning.  - Will schedule NOB in approx 1-2 weeks. Needs GC/CT at that time.  - 1st trimester screening scheduled for 1/3/2020.    Continue scheduled prenatal care, RTC in 1-2 weeks and prn if questions or concerns.      GUANAKO Agrawal, CNM     Addendum:  Ultrasound performed. FHTS 171bpm.     Measurements                 CRL = 27.3 mm = 9 4/7 weeks  EGA.   JENNYFER = 7/10/20.                 Fetal anatomy appears normal for gestational age.                 Fetal/Fetal Cardiac Activity: Present.  FHR = 171bpm.                 Implantation: Intrauterine.                 Cervix = 5.9 cm                 Maternal structures appear normal with a " left ovarian cyst measuring 1.7 x 1.8 x 1.7cm.     Impression: IUP with JENNYFER; 7/10/2020    GUANAKO Agrawal, CHAMPM

## 2019-12-26 ENCOUNTER — OFFICE VISIT (OUTPATIENT)
Dept: OBGYN | Facility: CLINIC | Age: 29
End: 2019-12-26
Attending: ADVANCED PRACTICE MIDWIFE
Payer: COMMERCIAL

## 2019-12-26 VITALS
HEIGHT: 70 IN | HEART RATE: 67 BPM | SYSTOLIC BLOOD PRESSURE: 129 MMHG | DIASTOLIC BLOOD PRESSURE: 74 MMHG | WEIGHT: 152 LBS | BODY MASS INDEX: 21.76 KG/M2

## 2019-12-26 DIAGNOSIS — F41.9 ANXIETY: ICD-10-CM

## 2019-12-26 DIAGNOSIS — B00.9 HSV (HERPES SIMPLEX VIRUS) INFECTION: ICD-10-CM

## 2019-12-26 DIAGNOSIS — O09.891 SUPERVISION OF OTHER HIGH RISK PREGNANCIES, FIRST TRIMESTER: Primary | ICD-10-CM

## 2019-12-26 PROCEDURE — 87591 N.GONORRHOEAE DNA AMP PROB: CPT | Performed by: ADVANCED PRACTICE MIDWIFE

## 2019-12-26 PROCEDURE — 87491 CHLMYD TRACH DNA AMP PROBE: CPT | Performed by: ADVANCED PRACTICE MIDWIFE

## 2019-12-26 ASSESSMENT — MIFFLIN-ST. JEOR: SCORE: 1499.72

## 2019-12-26 ASSESSMENT — PAIN SCALES - GENERAL: PAINLEVEL: NO PAIN (0)

## 2019-12-26 NOTE — PROGRESS NOTES
SUBJECTIVE:   Rach is a 29 year old female who presents to clinic for a new OB visit.   at 11w6d with Estimated Date of Delivery: Jul 10, 2020 based on US confirms. Feels well. Has  started PNV.     She has had bleeding since last visit, Just before dating us..   She has not had nausea. Weight loss has not occurred.   This was a planned pregnancy.   Partner is involved,  Tim     OTHER CONCERNS: recent loss    ===========================================   ROS: 10 point ROS neg other than the symptoms noted above in the HPI.      PSYCHIATRIC:  Denies mood changes.  Has History of anxiety  PHQ-9 score:    PHQ-9 SCORE 10/9/2019   PHQ-9 Total Score 7     MYNOR-7 SCORE 10/9/2019   Total Score 8         Past History:  Her past medical history   Past Medical History:   Diagnosis Date     Anxiety     as adult, therapy, no meds or hosp     Herpes simplex without mention of complication 2011     Syncope and collapse     Cardiology workup with Dr Brady, wandering atrial pacer, no activity restritction and no need for abx prophylaxis (mild pulm flow murmur)   .     Since her last LMP she denies use of alcohol, tobacco and street drugs.  HISTORY:  Family History   Problem Relation Age of Onset     Hypertension Mother      Cancer Maternal Aunt         Melanoma     Social History     Socioeconomic History     Marital status:      Spouse name: Russell     Number of children: Not on file     Years of education: Not on file     Highest education level: Not on file   Occupational History     Occupation:    Social Needs     Financial resource strain: Not on file     Food insecurity:     Worry: Not on file     Inability: Not on file     Transportation needs:     Medical: Not on file     Non-medical: Not on file   Tobacco Use     Smoking status: Never Smoker     Smokeless tobacco: Never Used     Tobacco comment: Non smoking home   Substance and Sexual Activity     Alcohol use: Not Currently      Comment: four drinks once every two weeks.     Drug use: No     Sexual activity: Yes     Partners: Male     Birth control/protection: Pill, None   Lifestyle     Physical activity:     Days per week: Not on file     Minutes per session: Not on file     Stress: Not on file   Relationships     Social connections:     Talks on phone: Not on file     Gets together: Not on file     Attends Jewish service: Not on file     Active member of club or organization: Not on file     Attends meetings of clubs or organizations: Not on file     Relationship status: Not on file     Intimate partner violence:     Fear of current or ex partner: Not on file     Emotionally abused: Not on file     Physically abused: Not on file     Forced sexual activity: Not on file   Other Topics Concern     Parent/sibling w/ CABG, MI or angioplasty before 65F 55M? Not Asked   Social History Narrative     Not on file     Current Outpatient Medications   Medication Sig     CALCIUM PO      Prenatal Vit-Fe Fumarate-FA (PRENATAL VITAMIN) 27-0.8 MG TABS      valACYclovir (VALTREX) 500 MG tablet Take 1 tablet (500 mg) by mouth daily     No current facility-administered medications for this visit.      Allergies   Allergen Reactions     No Known Drug Allergies        ============================================  MEDICAL HISTORY  Past Medical History:   Diagnosis Date     Anxiety     as adult, therapy, no meds or hosp     Herpes simplex without mention of complication 2011     Syncope and collapse     Cardiology workup with Dr Brady, wandering atrial pacer, no activity restritction and no need for abx prophylaxis (mild pulm flow murmur)     Past Surgical History:   Procedure Laterality Date     DILATION AND EVACUATION N/A 7/3/2019    Procedure: Dilation And Evacuation;  Surgeon: Josefa Kwan MD;  Location: [facility]      REMOVE TONSILS/ADENOIDS,<13 Y/O      T & A <13y.o.       OB History    Para Term  AB Living   3 0 0 0 2 0  "  SAB TAB Ectopic Multiple Live Births   1 1 0 0 0      # Outcome Date GA Lbr Tawanda/2nd Weight Sex Delivery Anes PTL Lv   3 Current            2 SAB 10/04/19           1 TAB 19 15w6d       FD         GYN History-  Abnormal Pap Smears                      Cervical procedures:                         History of STI:     I personally reviewed the past social/family/medical and surgical history on the date of service.   I reviewed lab work done at Intake visit with patient.    EXAM:  /74   Pulse 67   Ht 1.778 m (5' 10\")   Wt 68.9 kg (152 lb)   LMP 10/04/2019   BMI 21.81 kg/m     EXAM:  GENERAL:  Pleasant pregnant female, alert, cooperative  and well groomed.  SKIN:  Warm and dry, without lesions or rashes  HEAD: Symmetrical features.  MOUTH:  Buccal mucosa pink, moist without lesions.  Teeth in good repair.    NECK:  Thyroid without enlargement and nodules.  Lymph nodes not palpable.   LUNGS:  Clear to auscultation.  BREAST:    No dominant, fixed or suspicious masses are noted.  No skin or nipple changes or axillary nodes.   Nipples everted.      HEART:  RRR without murmur.  ABDOMEN: Soft without masses , tenderness or organomegaly.  No CVA tenderness.  Uterus palpable at size equal to dates.  No scars noted.. Fetal heart tones present.  MUSCULOSKELETAL:  Full range of motion  EXTREMITIES:  No edema. No significant varicosities.   PELVIC EXAM:  deferred    GC/CHLAMYDIA CULTURE OBTAINED:YES    Lab Results   Component Value Date    PAP NIL 2017              ASSESSMENT:  29 year old , 11w6d weeks of pregnancy with JENNYFER of Jul 10, 2020 by US confirms  Intrauterine pregnancy 11w6d size  consistent with dates  Genetic Screening: First Trimester Screen    ICD-10-CM    1. Supervision of other high risk pregnancies, first trimester O09.891 Chlamydia trachomatis PCR (Clinic Collect)     Neisseria gonorrhoeae PCR   2. HSV (herpes simplex virus) infection B00.9    3. Anxiety F41.9        PLAN:  - Reviewed " use of triage nurse line and contacting the on-call provider after hours for an urgent need such as fever, vagina bleeding, bladder or vaginal infection, rupture of membranes,  or term labor.    - Reviewed best evidence for: weight gain for her weight and height for pregnancy:  Based on pre-pregnancy Body mass index is 21.81 kg/m . RECOMMENDED WEIGHT GAIN: 25-35 lbs.        - Reviewed healthy diet and foods to avoid, exercise and activity during pregnancy; avoiding exposure to toxoplasmosis; and maintenance of a generally healthy lifestyle.   - Discussed the harms, benefits, side effects and alternative therapies for current prescribed and OTC medications.    - - All pt's and partner's  questions discussed and answered.  Pt verbalized understanding of and agreement to plan of care.  Reassurance offered. Pt may return for more freq FHT checks given history and anxiety.  Further discussion of CNM and MD practice, pt may desire CNM support in labor--will consider.    - Continue scheduled prenatal care and prn if questions or concerns    Ni Boyle, GUANAKO KAPOOR

## 2019-12-26 NOTE — LETTER
2019       RE: Rach Peterson  [address]  [place] [address]     Dear Colleague,    Thank you for referring your patient, Rach Peterson, to the [facility] at [facility]. Please see a copy of my visit note below.    SUBJECTIVE:   Rach is a 29 year old female who presents to clinic for a new OB visit.   at 11w6d with Estimated Date of Delivery: Jul 10, 2020 based on US confirms. Feels well. Has  started PNV.     She has had bleeding since last visit, Just before dating us..   She has not had nausea. Weight loss has not occurred.   This was a planned pregnancy.   Partner is involved,  Tim     OTHER CONCERNS: recent loss    ===========================================   ROS: 10 point ROS neg other than the symptoms noted above in the HPI.      PSYCHIATRIC:  Denies mood changes.  Has History of anxiety  PHQ-9 score:    PHQ-9 SCORE 10/9/2019   PHQ-9 Total Score 7     MYNOR-7 SCORE 10/9/2019   Total Score 8         Past History:  Her past medical history   Past Medical History:   Diagnosis Date     Anxiety     as adult, therapy, no meds or hosp     Herpes simplex without mention of complication 2011     Syncope and collapse     Cardiology workup with Dr Brady, wandering atrial pacer, no activity restritction and no need for abx prophylaxis (mild pulm flow murmur)   .     Since her last LMP she denies use of alcohol, tobacco and street drugs.  HISTORY:  Family History   Problem Relation Age of Onset     Hypertension Mother      Cancer Maternal Aunt         Melanoma     Social History     Socioeconomic History     Marital status:      Spouse name: Russell     Number of children: Not on file     Years of education: Not on file     Highest education level: Not on file   Occupational History     Occupation:    Social Needs     Financial resource strain: Not on file     Food insecurity:     Worry: Not  on file     Inability: Not on file     Transportation needs:     Medical: Not on file     Non-medical: Not on file   Tobacco Use     Smoking status: Never Smoker     Smokeless tobacco: Never Used     Tobacco comment: Non smoking home   Substance and Sexual Activity     Alcohol use: Not Currently     Comment: four drinks once every two weeks.     Drug use: No     Sexual activity: Yes     Partners: Male     Birth control/protection: Pill, None   Lifestyle     Physical activity:     Days per week: Not on file     Minutes per session: Not on file     Stress: Not on file   Relationships     Social connections:     Talks on phone: Not on file     Gets together: Not on file     Attends Catholic service: Not on file     Active member of club or organization: Not on file     Attends meetings of clubs or organizations: Not on file     Relationship status: Not on file     Intimate partner violence:     Fear of current or ex partner: Not on file     Emotionally abused: Not on file     Physically abused: Not on file     Forced sexual activity: Not on file   Other Topics Concern     Parent/sibling w/ CABG, MI or angioplasty before 65F 55M? Not Asked   Social History Narrative     Not on file     Current Outpatient Medications   Medication Sig     CALCIUM PO      Prenatal Vit-Fe Fumarate-FA (PRENATAL VITAMIN) 27-0.8 MG TABS      valACYclovir (VALTREX) 500 MG tablet Take 1 tablet (500 mg) by mouth daily     No current facility-administered medications for this visit.      Allergies   Allergen Reactions     No Known Drug Allergies        ============================================  MEDICAL HISTORY  Past Medical History:   Diagnosis Date     Anxiety     as adult, therapy, no meds or hosp     Herpes simplex without mention of complication 12/1/2011     Syncope and collapse 2006    Cardiology workup with Dr Brady, wandering atrial pacer, no activity restritction and no need for abx prophylaxis (mild pulm flow murmur)     Past Surgical  "History:   Procedure Laterality Date     DILATION AND EVACUATION N/A 7/3/2019    Procedure: Dilation And Evacuation;  Surgeon: Josefa Kwan MD;  Location: [facility]     HC REMOVE TONSILS/ADENOIDS,<11 Y/O      T & A <13y.o.       OB History    Para Term  AB Living   3 0 0 0 2 0   SAB TAB Ectopic Multiple Live Births   1 1 0 0 0      # Outcome Date GA Lbr Tawanda/2nd Weight Sex Delivery Anes PTL Lv   3 Current            2 SAB 10/04/19           1 TAB 19 15w6d       FD         GYN History-  Abnormal Pap Smears                      Cervical procedures:                         History of STI:     I personally reviewed the past social/family/medical and surgical history on the date of service.   I reviewed lab work done at Intake visit with patient.    EXAM:  /74   Pulse 67   Ht 1.778 m (5' 10\")   Wt 68.9 kg (152 lb)   LMP 10/04/2019   BMI 21.81 kg/m      EXAM:  GENERAL:  Pleasant pregnant female, alert, cooperative  and well groomed.  SKIN:  Warm and dry, without lesions or rashes  HEAD: Symmetrical features.  MOUTH:  Buccal mucosa pink, moist without lesions.  Teeth in good repair.    NECK:  Thyroid without enlargement and nodules.  Lymph nodes not palpable.   LUNGS:  Clear to auscultation.  BREAST:    No dominant, fixed or suspicious masses are noted.  No skin or nipple changes or axillary nodes.   Nipples everted.      HEART:  RRR without murmur.  ABDOMEN: Soft without masses , tenderness or organomegaly.  No CVA tenderness.  Uterus palpable at size equal to dates.  No scars noted.. Fetal heart tones present.  MUSCULOSKELETAL:  Full range of motion  EXTREMITIES:  No edema. No significant varicosities.   PELVIC EXAM:  deferred    GC/CHLAMYDIA CULTURE OBTAINED:YES    Lab Results   Component Value Date    PAP NIL 2017              ASSESSMENT:  29 year old , 11w6d weeks of pregnancy with JENNYFER of Jul 10, 2020 by US confirms  Intrauterine pregnancy 11w6d size  consistent with " dates  Genetic Screening: First Trimester Screen    ICD-10-CM    1. Supervision of other high risk pregnancies, first trimester O09.891 Chlamydia trachomatis PCR (Clinic Collect)     Neisseria gonorrhoeae PCR   2. HSV (herpes simplex virus) infection B00.9    3. Anxiety F41.9        PLAN:  - Reviewed use of triage nurse line and contacting the on-call provider after hours for an urgent need such as fever, vagina bleeding, bladder or vaginal infection, rupture of membranes,  or term labor.    - Reviewed best evidence for: weight gain for her weight and height for pregnancy:  Based on pre-pregnancy Body mass index is 21.81 kg/m . RECOMMENDED WEIGHT GAIN: 25-35 lbs.        - Reviewed healthy diet and foods to avoid, exercise and activity during pregnancy; avoiding exposure to toxoplasmosis; and maintenance of a generally healthy lifestyle.   - Discussed the harms, benefits, side effects and alternative therapies for current prescribed and OTC medications.    - - All pt's and partner's  questions discussed and answered.  Pt verbalized understanding of and agreement to plan of care.  Reassurance offered. Pt may return for more freq FHT checks given history and anxiety.  Further discussion of CNM and MD practice, pt may desire CNM support in labor--will consider.    - Continue scheduled prenatal care and prn if questions or concerns    GUANAKO Ocampo CNM

## 2019-12-27 LAB
C TRACH DNA SPEC QL NAA+PROBE: NEGATIVE
N GONORRHOEA DNA SPEC QL NAA+PROBE: NEGATIVE
SPECIMEN SOURCE: NORMAL
SPECIMEN SOURCE: NORMAL

## 2019-12-31 ENCOUNTER — PRE VISIT (OUTPATIENT)
Dept: MATERNAL FETAL MEDICINE | Facility: CLINIC | Age: 29
End: 2019-12-31

## 2020-01-03 ENCOUNTER — OFFICE VISIT (OUTPATIENT)
Dept: MATERNAL FETAL MEDICINE | Facility: CLINIC | Age: 30
End: 2020-01-03
Attending: ADVANCED PRACTICE MIDWIFE
Payer: COMMERCIAL

## 2020-01-03 ENCOUNTER — HOSPITAL ENCOUNTER (OUTPATIENT)
Dept: LAB | Facility: CLINIC | Age: 30
End: 2020-01-03
Attending: ADVANCED PRACTICE MIDWIFE
Payer: COMMERCIAL

## 2020-01-03 ENCOUNTER — HOSPITAL ENCOUNTER (OUTPATIENT)
Dept: ULTRASOUND IMAGING | Facility: CLINIC | Age: 30
Discharge: HOME | End: 2020-01-03
Attending: ADVANCED PRACTICE MIDWIFE | Admitting: ADVANCED PRACTICE MIDWIFE
Payer: COMMERCIAL

## 2020-01-03 DIAGNOSIS — Z36.9 PRENATAL SCREENING ENCOUNTER: ICD-10-CM

## 2020-01-03 DIAGNOSIS — Z36.9 PRENATAL SCREENING ENCOUNTER: Primary | ICD-10-CM

## 2020-01-03 DIAGNOSIS — O26.90 PREGNANCY RELATED CONDITION, ANTEPARTUM: ICD-10-CM

## 2020-01-03 DIAGNOSIS — Z82.79 FAMILY HISTORY OF BIRTH DEFECT: ICD-10-CM

## 2020-01-03 DIAGNOSIS — Z36.82 ENCOUNTER FOR NUCHAL TRANSLUCENCY TESTING: ICD-10-CM

## 2020-01-03 DIAGNOSIS — Z82.79 FAMILY HISTORY OF BIRTH DEFECT: Primary | ICD-10-CM

## 2020-01-03 PROCEDURE — 36415 COLL VENOUS BLD VENIPUNCTURE: CPT | Performed by: OBSTETRICS & GYNECOLOGY

## 2020-01-03 PROCEDURE — 40000791 ZZHCL STATISTIC VERIFI PRENATAL TRISOMY 21,18,13: Performed by: OBSTETRICS & GYNECOLOGY

## 2020-01-03 PROCEDURE — 76813 OB US NUCHAL MEAS 1 GEST: CPT

## 2020-01-03 PROCEDURE — 96040 ZZH GENETIC COUNSELING, EACH 30 MINUTES: CPT | Mod: ZF | Performed by: GENETIC COUNSELOR, MS

## 2020-01-03 NOTE — PROGRESS NOTES
[facility]  Genetic Counseling Consult    Patient: Rach Peterson YOB: 1990   Date of Service: 20      Rach Peterson was seen at [facility] for genetic consultation to discuss the options for screening and testing for fetal chromosome abnormalities.  The indication for genetic counseling is routine screening for aneuploidy and prior pregnancy with omphalocele.        Impression/Plan:   1.  Rach had an ultrasound and blood draw for NIPT (Innatal test through [employer]).  Results are expected within 7-10 days, and will be available in "[employer].  We will contact her to discuss the results, and a copy will be forwarded to the office of the referring OB provider.  Rach requests that we contact her  Tim to discuss results at his number [phone], and there is a consent to communicate with him already completed in Rach's EMR.  The couple requests that detailed results, including fetal sex indicated by testing, be left in his voicemail if he cannot be reached.    2.  Maternal serum AFP (single marker screen) is recommended after 15 weeks to screen for open neural tube defects. A quad screen should not be performed.    3.  An 18-20 week comprehensive ultrasound is recommended given the history of prior pregnancy with a congenital anomaly.    Pregnancy History:   /Parity:    Age at Delivery: 29 year old  JENNYFER: 7/10/2020, by Last Menstrual Period  Gestational Age: 13w0d    No significant complications or exposures were reported in the current pregnancy.    Rach denton pregnancy history is significant for 1 prior pregnancy diagnosed with an omphalocele that ended via D&E and 1 prior miscarriage in the first trimester with no known cause.  Today we reviewed that omphaloceles have a high association with chromosome abnormalities, but that genetic testing for the affected pregnancy was negative,  "normal female pattern.  We discussed that in the absence of a genetic cause and as an isolated finding, omphalocele is thought to occur as a random or sporadic finding.  This means that recurrence risk for the current and future pregnancies are considered low, but no zero.  Rach and Tim expressed significant anxiety surrounding the pregnancy, and they are both expressed concerns that even if they have normal anatomy ultrasounds, the couple will just \"move on to the next thing to worry about\"  We discussed that anxiety surrounding pregnancy is a very normal occurrence, especially for families that have experienced a loss in the manner that they have.  The couple reports that they feel well supported by each other and family.  Today's ultrasound noted no findings concerning for omphalocele.      Medical History:   Rach s reported medical history is not expected to impact pregnancy management or risks to fetal development.       Family History:   A three-generation pedigree was obtained, and is scanned under the  Media  tab.   Other than the omphalocele in Rach's first pregnancy, the reported family history is negative for multiple miscarriages, stillbirths, birth defects, cognitive impairment, known genetic conditions, and consanguinity.       Carrier Screening:       Carrier screening was not discussed today.       Risk Assessment for Chromosome Conditions:   We explained that the risk for fetal chromosome abnormalities increases with maternal age. We discussed specific features of common chromosome abnormalities, including Down syndrome, trisomy 13, trisomy 18, and sex chromosome trisomies.      - At age 29 at midtrimester, the risk to have a baby with Down syndrome is 1 in 855.    - At age 29 at midtrimester, the risk to have a baby with any chromosome abnormality is 1 in 428.          Testing Options:   We discussed the following options:   First trimester screening    First trimester ultrasound with " nuchal translucency and nasal bone assessments, maternal plasma hCG, HUNTER-A, and AFP measurement    Screens for fetal trisomy 21, trisomy 13, and trisomy 18    Cannot screen for open neural tube defects; maternal serum AFP after 15 weeks is recommended       Non-invasive Prenatal Testing (NIPT)    Maternal plasma cell-free DNA testing; first trimester ultrasound with nuchal translucency and nasal bone assessment is recommended, when appropriate    Screens for fetal trisomy 21, trisomy 13, trisomy 18, and sex chromosome aneuploidy    Cannot screen for open neural tube defects; maternal serum AFP after 15 weeks is recommended       Genetic Amniocentesis    Invasive procedure typically performed in the second trimester by which amniotic fluid is obtained for the purpose of chromosome analysis and/or other prenatal genetic analysis    Diagnostic results; >99% sensitivity for fetal chromosome abnormalities    AFAFP measurement tests for open neural tube defects       Comprehensive (Level II) ultrasound: Detailed ultrasound performed between 18-22 weeks gestation to screen for major birth defects and markers for aneuploidy.        We reviewed the benefits and limitations of this testing.  Screening tests provide a risk assessment specific to the pregnancy for certain fetal chromosome abnormalities, but cannot definitively diagnose or exclude a fetal chromosome abnormality.  Follow-up genetic counseling and consideration of diagnostic testing is recommended with any abnormal screening result.     Diagnostic tests carry inherent risks- including risk of miscarriage- that require careful consideration.  These tests can detect fetal chromosome abnormalities with greater than 99% certainty.  Results can be compromised by maternal cell contamination or mosaicism, and are limited by the resolution of cytogenetic G-banding technology.  There is no screening nor diagnostic test that can detect all forms of birth defects or mental  disability.     It was a pleasure to be involved with Rach s care. Face-to-face time of the meeting was 35 minutes.      Dom Matthews MS, [facility]  Licensed Genetic Counselor  Phone: [phone]  Pager: [phone]

## 2020-01-03 NOTE — PROGRESS NOTES
Please see ultrasound report under imaging tab for details on ultrasound performed today.    Cele Bates MD  , OB/GYN  Maternal-Fetal Medicine  [email or web address]  [phone] (Academic office)  [phone] (Pager)

## 2020-01-07 ASSESSMENT — ANXIETY QUESTIONNAIRES
1. FEELING NERVOUS, ANXIOUS, OR ON EDGE: MORE THAN HALF THE DAYS
3. WORRYING TOO MUCH ABOUT DIFFERENT THINGS: SEVERAL DAYS
4. TROUBLE RELAXING: SEVERAL DAYS
5. BEING SO RESTLESS THAT IT IS HARD TO SIT STILL: NOT AT ALL
6. BECOMING EASILY ANNOYED OR IRRITABLE: SEVERAL DAYS
7. FEELING AFRAID AS IF SOMETHING AWFUL MIGHT HAPPEN: MORE THAN HALF THE DAYS
GAD7 TOTAL SCORE: 8
2. NOT BEING ABLE TO STOP OR CONTROL WORRYING: SEVERAL DAYS
GAD7 TOTAL SCORE: 8
7. FEELING AFRAID AS IF SOMETHING AWFUL MIGHT HAPPEN: MORE THAN HALF THE DAYS

## 2020-01-08 ASSESSMENT — ANXIETY QUESTIONNAIRES: GAD7 TOTAL SCORE: 8

## 2020-01-09 ENCOUNTER — TELEPHONE (OUTPATIENT)
Dept: MATERNAL FETAL MEDICINE | Facility: CLINIC | Age: 30
End: 2020-01-09

## 2020-01-09 NOTE — TELEPHONE ENCOUNTER
1/9/2020       Called Rach's  Tim to discuss NIPT results per plan established at her genetic counseling appointment.  Results came back negative for chromosome abnormalities in chromosomes 21, 18, & 13, as well as the sex chromosomes.  These test results do not definitively rule out the possibility of one of these conditions, but they do greatly reduce the likelihood.  The test identified sex chromosomes consistent with female sex (XX).  Tim had no questions at this time and was encouraged to reach out if he or Rach has any questions or concerns in the future.       Dom Matthews MS, [facility]  Licensed Genetic Counselor  Phone: [phone]  Pager: [phone]

## 2020-01-10 ENCOUNTER — OFFICE VISIT (OUTPATIENT)
Dept: OBGYN | Facility: CLINIC | Age: 30
End: 2020-01-10
Attending: ADVANCED PRACTICE MIDWIFE
Payer: COMMERCIAL

## 2020-01-10 VITALS
DIASTOLIC BLOOD PRESSURE: 68 MMHG | WEIGHT: 152.6 LBS | BODY MASS INDEX: 21.85 KG/M2 | HEART RATE: 64 BPM | HEIGHT: 70 IN | SYSTOLIC BLOOD PRESSURE: 108 MMHG

## 2020-01-10 DIAGNOSIS — O09.892 SUPERVISION OF OTHER HIGH RISK PREGNANCIES, SECOND TRIMESTER: Primary | ICD-10-CM

## 2020-01-10 LAB — LAB SCANNED RESULT: NORMAL

## 2020-01-10 PROCEDURE — G0463 HOSPITAL OUTPT CLINIC VISIT: HCPCS | Mod: ZF

## 2020-01-10 ASSESSMENT — MIFFLIN-ST. JEOR: SCORE: 1502.44

## 2020-01-10 NOTE — PROGRESS NOTES
"Subjective:     29 year old  at 14w0d presents for a routine prenatal appointment.      Denies vaginal bleeding or leakage of fluid.  Denies contractions or cramping.  No fetal movement yet.       No HA, visual changes, RUQ or epigastric pain.   Level II US- Scheduled.   The patient presents with the following concerns:    - Questions re: safety of sprint marathon, wt gain   - Has anxiety, increased with pregnancy d/t history of losses, has been seeing therapist, feels managing overall well   - [place] paperwork, will complete and email back to patient, correct email address in demographics     Objective:  Vitals:    01/10/20 0930   BP: 108/68   BP Location: Left arm   Patient Position: Chair   Pulse: 64   Weight: 69.2 kg (152 lb 9.6 oz)   Height: 1.778 m (5' 10\")   See OB flowsheet    Assessment/Plan:  Encounter Diagnosis   Name Primary?     Supervision of other high risk pregnancies, second trimester Yes     - Reviewed total weight gain, encouraged continued healthy diet and exercise.      - Reviewed why/how to contact provider.   - Patient education/orders or handouts today: PTL signs/symptoms and Level 2 u/s scheduled   - Discussed listening to baby prior to start of visit, more frequent visits, etc to decrease anxiety.  - Return to clinic in 2-4 weeks and prn if questions or concerns.   GUANAKO Mcneill CNM  "

## 2020-01-10 NOTE — LETTER
"1/10/2020       RE: Rach Peterson   [address] [place] [address]     Dear Colleague,    Thank you for referring your patient, Rach Peterson, to the [facility] at [facility]. Please see a copy of my visit note below.    Subjective:     29 year old  at 14w0d presents for a routine prenatal appointment.      Denies vaginal bleeding or leakage of fluid.  Denies contractions or cramping.  No fetal movement yet.       No HA, visual changes, RUQ or epigastric pain.   Level II US- Scheduled.   The patient presents with the following concerns:    - Questions re: safety of sprint marathon, wt gain   - Has anxiety, increased with pregnancy d/t history of losses, has been seeing therapist, feels managing overall well   - FMLA paperwork, will complete and email back to patient, correct email address in demographics     Objective:  Vitals:    01/10/20 0930   BP: 108/68   BP Location: Left arm   Patient Position: Chair   Pulse: 64   Weight: 69.2 kg (152 lb 9.6 oz)   Height: 1.778 m (5' 10\")   See OB flowsheet    Assessment/Plan:  Encounter Diagnosis   Name Primary?     Supervision of other high risk pregnancies, second trimester Yes     - Reviewed total weight gain, encouraged continued healthy diet and exercise.      - Reviewed why/how to contact provider.   - Patient education/orders or handouts today: PTL signs/symptoms and Level 2 u/s scheduled   - Discussed listening to baby prior to start of visit, more frequent visits, etc to decrease anxiety.  - Return to clinic in 2-4 weeks and prn if questions or concerns.     GUANAKO Mcneill CNM        "

## 2020-01-13 ENCOUNTER — TELEPHONE (OUTPATIENT)
Dept: OBGYN | Facility: CLINIC | Age: 30
End: 2020-01-13

## 2020-01-24 ASSESSMENT — ANXIETY QUESTIONNAIRES
GAD7 TOTAL SCORE: 8
1. FEELING NERVOUS, ANXIOUS, OR ON EDGE: MORE THAN HALF THE DAYS
2. NOT BEING ABLE TO STOP OR CONTROL WORRYING: MORE THAN HALF THE DAYS
7. FEELING AFRAID AS IF SOMETHING AWFUL MIGHT HAPPEN: NEARLY EVERY DAY
3. WORRYING TOO MUCH ABOUT DIFFERENT THINGS: NOT AT ALL
4. TROUBLE RELAXING: SEVERAL DAYS
5. BEING SO RESTLESS THAT IT IS HARD TO SIT STILL: NOT AT ALL
7. FEELING AFRAID AS IF SOMETHING AWFUL MIGHT HAPPEN: NEARLY EVERY DAY
6. BECOMING EASILY ANNOYED OR IRRITABLE: NOT AT ALL
GAD7 TOTAL SCORE: 8

## 2020-01-25 ASSESSMENT — ANXIETY QUESTIONNAIRES: GAD7 TOTAL SCORE: 8

## 2020-01-27 ENCOUNTER — OFFICE VISIT (OUTPATIENT)
Dept: OBGYN | Facility: CLINIC | Age: 30
End: 2020-01-27
Attending: MIDWIFE
Payer: COMMERCIAL

## 2020-01-27 VITALS
WEIGHT: 157.1 LBS | DIASTOLIC BLOOD PRESSURE: 81 MMHG | BODY MASS INDEX: 22.54 KG/M2 | SYSTOLIC BLOOD PRESSURE: 125 MMHG | HEART RATE: 66 BPM

## 2020-01-27 DIAGNOSIS — O09.892 SUPERVISION OF OTHER HIGH RISK PREGNANCIES, SECOND TRIMESTER: Primary | ICD-10-CM

## 2020-01-27 DIAGNOSIS — F41.9 ANXIETY: ICD-10-CM

## 2020-01-27 PROBLEM — O09.891 SUPERVISION OF OTHER HIGH RISK PREGNANCIES, FIRST TRIMESTER: Status: ACTIVE | Noted: 2019-12-10

## 2020-01-27 PROBLEM — M25.861 IMPINGEMENT SYNDROME INVOLVING PATELLAR FAT PAD OF RIGHT KNEE: Status: RESOLVED | Noted: 2018-06-04 | Resolved: 2020-01-27

## 2020-01-27 PROCEDURE — G0463 HOSPITAL OUTPT CLINIC VISIT: HCPCS | Mod: ZF

## 2020-01-27 ASSESSMENT — PAIN SCALES - GENERAL: PAINLEVEL: NO PAIN (0)

## 2020-01-27 NOTE — PROGRESS NOTES
Subjective:      29 year old  at 16w3d presents for a routine prenatal appointment.       no vaginal bleeding or leakage of fluid.  no contractions or cramping.    not feeling  fetal movement.       No HA, visual changes, RUQ or epigastric pain.   The patient presents with the following concerns: very anxious due to hx of pregnancy loss  Hear for FHT check     Level II US  Scheduled.   Offered AFP declined today      Objective:  Vitals:    20 1612   BP: 125/81   Pulse: 66   Weight: 71.3 kg (157 lb 1.6 oz)     See OB flowsheet  .Assessment/Plan   No orders of the defined types were placed in this encounter.    - Reviewed total weight gain, encouraged continued healthy diet and exercise.      - Reviewed why/how to contact provider.    Patient education/orders or handouts today:  Fetal movement and Level 2 u/s scheduled   Return to clinic in 4 weeks and prn if questions or concerns.   GUANAKO Sommer CNM

## 2020-01-27 NOTE — LETTER
2020       RE: Rach Peterson   [address]  [place] [address]     Dear Colleague,    Thank you for referring your patient, Rach Peterson, to the [facility] at [facility]. Please see a copy of my visit note below.    Subjective:      29 year old  at 16w3d presents for a routine prenatal appointment.       no vaginal bleeding or leakage of fluid.  no contractions or cramping.    not feeling  fetal movement.       No HA, visual changes, RUQ or epigastric pain.   The patient presents with the following concerns: very anxious due to hx of pregnancy loss  Hear for FHT check     Level II US  Scheduled.   Offered AFP declined today      Objective:  Vitals:    20 1612   BP: 125/81   Pulse: 66   Weight: 71.3 kg (157 lb 1.6 oz)     See OB flowsheet  .Assessment/Plan   No orders of the defined types were placed in this encounter.    - Reviewed total weight gain, encouraged continued healthy diet and exercise.      - Reviewed why/how to contact provider.    Patient education/orders or handouts today:  Fetal movement and Level 2 u/s scheduled   Return to clinic in 4 weeks and prn if questions or concerns.     GUANAKO Sommer CNM

## 2020-02-03 ENCOUNTER — TELEPHONE (OUTPATIENT)
Dept: OBGYN | Facility: CLINIC | Age: 30
End: 2020-02-03

## 2020-02-14 ENCOUNTER — OFFICE VISIT (OUTPATIENT)
Dept: MATERNAL FETAL MEDICINE | Facility: CLINIC | Age: 30
End: 2020-02-14
Attending: OBSTETRICS & GYNECOLOGY
Payer: COMMERCIAL

## 2020-02-14 ENCOUNTER — HOSPITAL ENCOUNTER (OUTPATIENT)
Dept: ULTRASOUND IMAGING | Facility: CLINIC | Age: 30
Discharge: HOME | End: 2020-02-14
Attending: OBSTETRICS & GYNECOLOGY | Admitting: OBSTETRICS & GYNECOLOGY
Payer: COMMERCIAL

## 2020-02-14 DIAGNOSIS — O35.9XX0 SUSPECTED FETAL ANOMALY, ANTEPARTUM, SINGLE OR UNSPECIFIED FETUS: Primary | ICD-10-CM

## 2020-02-14 DIAGNOSIS — Z82.79 FAMILY HISTORY OF BIRTH DEFECT: ICD-10-CM

## 2020-02-14 PROCEDURE — 76811 OB US DETAILED SNGL FETUS: CPT

## 2020-02-14 NOTE — PROGRESS NOTES
"Please see \"Imaging\" tab under \"Chart Review\" for details of today's visit.    Isidro Kraus    "

## 2020-02-25 ASSESSMENT — ANXIETY QUESTIONNAIRES
3. WORRYING TOO MUCH ABOUT DIFFERENT THINGS: NOT AT ALL
2. NOT BEING ABLE TO STOP OR CONTROL WORRYING: SEVERAL DAYS
1. FEELING NERVOUS, ANXIOUS, OR ON EDGE: SEVERAL DAYS
GAD7 TOTAL SCORE: 4
7. FEELING AFRAID AS IF SOMETHING AWFUL MIGHT HAPPEN: SEVERAL DAYS
7. FEELING AFRAID AS IF SOMETHING AWFUL MIGHT HAPPEN: SEVERAL DAYS
5. BEING SO RESTLESS THAT IT IS HARD TO SIT STILL: NOT AT ALL
4. TROUBLE RELAXING: NOT AT ALL
GAD7 TOTAL SCORE: 4
6. BECOMING EASILY ANNOYED OR IRRITABLE: SEVERAL DAYS

## 2020-02-26 ASSESSMENT — ANXIETY QUESTIONNAIRES: GAD7 TOTAL SCORE: 4

## 2020-02-28 ENCOUNTER — OFFICE VISIT (OUTPATIENT)
Dept: OBGYN | Facility: CLINIC | Age: 30
End: 2020-02-28
Attending: ADVANCED PRACTICE MIDWIFE
Payer: COMMERCIAL

## 2020-02-28 VITALS
HEIGHT: 70 IN | BODY MASS INDEX: 23.12 KG/M2 | WEIGHT: 161.5 LBS | DIASTOLIC BLOOD PRESSURE: 82 MMHG | SYSTOLIC BLOOD PRESSURE: 128 MMHG | HEART RATE: 87 BPM

## 2020-02-28 DIAGNOSIS — Z34.80 SUPERVISION OF OTHER NORMAL PREGNANCY: Primary | ICD-10-CM

## 2020-02-28 PROCEDURE — G0463 HOSPITAL OUTPT CLINIC VISIT: HCPCS | Mod: ZF

## 2020-02-28 ASSESSMENT — MIFFLIN-ST. JEOR: SCORE: 1542.81

## 2020-02-28 ASSESSMENT — PAIN SCALES - GENERAL: PAINLEVEL: NO PAIN (0)

## 2020-02-28 NOTE — PROGRESS NOTES
"Subjective:     29 year old  at 21w0d presents for a routine prenatal appointment.      Denies vaginal bleeding or leakage of fluid.  Denies contractions or cramping.  + fetal movement.       No HA, visual changes, RUQ or epigastric pain.   The patient presents with the following concerns:     - Feeling movement, which has decreased some her anxiety. Still desires closely spaced visits to listen to baby   - Questions re: coronavirus  Level II US  Results reviewed normal scan.     Objective:  Vitals:    20 0804   BP: 128/82   Pulse: 87   Weight: 73.3 kg (161 lb 8 oz)   Height: 1.778 m (5' 10\")   See OB flowsheet    Assessment/Plan:  Encounter Diagnosis   Name Primary?     Supervision of other normal pregnancy Yes     - Reviewed total weight gain, encouraged continued healthy diet and exercise.      - Reviewed why/how to contact provider.   - Patient education/orders or handouts today: PTL signs/symptoms and Fetal movement   - Encouraged good hand washing practices, avoiding sick contacts, protection with travel, etc.  - Desires CNM care  - Return to clinic in 3 weeks and prn if questions or concerns.     Mamadou Horner, GUANAKO CNM  "

## 2020-02-28 NOTE — LETTER
"2020       RE: Rach Peterson   [address] [place] [address]     Dear Colleague,    Thank you for referring your patient, Rach Peterson, to the [facility] at [facility]. Please see a copy of my visit note below.    Subjective:     29 year old  at 21w0d presents for a routine prenatal appointment.      Denies vaginal bleeding or leakage of fluid.  Denies contractions or cramping.  + fetal movement.       No HA, visual changes, RUQ or epigastric pain.   The patient presents with the following concerns:     - Feeling movement, which has decreased some her anxiety. Still desires closely spaced visits to listen to baby   - Questions re: coronavirus  Level II US  Results reviewed normal scan.     Objective:  Vitals:    20 0804   BP: 128/82   Pulse: 87   Weight: 73.3 kg (161 lb 8 oz)   Height: 1.778 m (5' 10\")   See OB flowsheet    Assessment/Plan:  Encounter Diagnosis   Name Primary?     Supervision of other normal pregnancy Yes     - Reviewed total weight gain, encouraged continued healthy diet and exercise.      - Reviewed why/how to contact provider.   - Patient education/orders or handouts today: PTL signs/symptoms and Fetal movement   - Encouraged good hand washing practices, avoiding sick contacts, protection with travel, etc.  - Desires CNM care  - Return to clinic in 3 weeks and prn if questions or concerns.     GUANAKO Mcneill CNM        "

## 2020-03-06 ENCOUNTER — OFFICE VISIT (OUTPATIENT)
Dept: MATERNAL FETAL MEDICINE | Facility: CLINIC | Age: 30
End: 2020-03-06
Attending: OBSTETRICS & GYNECOLOGY
Payer: COMMERCIAL

## 2020-03-06 ENCOUNTER — HOSPITAL ENCOUNTER (OUTPATIENT)
Dept: ULTRASOUND IMAGING | Facility: CLINIC | Age: 30
End: 2020-03-06
Attending: OBSTETRICS & GYNECOLOGY
Payer: COMMERCIAL

## 2020-03-06 DIAGNOSIS — Z82.79 FAMILY HISTORY OF BIRTH DEFECT: Primary | ICD-10-CM

## 2020-03-06 DIAGNOSIS — O35.9XX0 SUSPECTED FETAL ANOMALY, ANTEPARTUM, SINGLE OR UNSPECIFIED FETUS: ICD-10-CM

## 2020-03-06 PROCEDURE — 76816 OB US FOLLOW-UP PER FETUS: CPT

## 2020-03-06 NOTE — PROGRESS NOTES
Please see ultrasound report under imaging tab for details on ultrasound performed today.    Cele Bates MD  , OB/GYN  Maternal-Fetal Medicine  [email or web address]  [phone] (Academic office)  [phone] (Pager)

## 2020-03-17 ASSESSMENT — ANXIETY QUESTIONNAIRES
1. FEELING NERVOUS, ANXIOUS, OR ON EDGE: SEVERAL DAYS
7. FEELING AFRAID AS IF SOMETHING AWFUL MIGHT HAPPEN: MORE THAN HALF THE DAYS
2. NOT BEING ABLE TO STOP OR CONTROL WORRYING: SEVERAL DAYS
6. BECOMING EASILY ANNOYED OR IRRITABLE: NOT AT ALL
GAD7 TOTAL SCORE: 6
GAD7 TOTAL SCORE: 6
3. WORRYING TOO MUCH ABOUT DIFFERENT THINGS: SEVERAL DAYS
4. TROUBLE RELAXING: SEVERAL DAYS
7. FEELING AFRAID AS IF SOMETHING AWFUL MIGHT HAPPEN: MORE THAN HALF THE DAYS
5. BEING SO RESTLESS THAT IT IS HARD TO SIT STILL: NOT AT ALL

## 2020-03-18 ASSESSMENT — ANXIETY QUESTIONNAIRES: GAD7 TOTAL SCORE: 6

## 2020-03-20 ENCOUNTER — OFFICE VISIT (OUTPATIENT)
Dept: OBGYN | Facility: CLINIC | Age: 30
End: 2020-03-20
Attending: MIDWIFE
Payer: COMMERCIAL

## 2020-03-20 DIAGNOSIS — B00.9 HSV (HERPES SIMPLEX VIRUS) INFECTION: ICD-10-CM

## 2020-03-20 DIAGNOSIS — F41.9 ANXIETY: ICD-10-CM

## 2020-03-20 DIAGNOSIS — Z34.80 SUPERVISION OF OTHER NORMAL PREGNANCY: Primary | ICD-10-CM

## 2020-03-20 NOTE — LETTER
"3/20/2020       RE: Rach Peterson   [address] [place] [address]     Dear Colleague,    Thank you for referring your patient, Rach Peterson, to the [facility] at [facility]. Please see a copy of my visit note below.    Rach Peterson is a 29 year old female, , who is being evaluated via a billable telephone visit.      The patient has been notified of following:   \"This telephone visit will be conducted via a call between you and your physician/provider. We have found that certain health care needs can be provided without the need for a physical exam.  This service lets us provide the care you need with a short phone conversation.  If a prescription is necessary we can send it directly to your pharmacy.  If lab work is needed we can place an order for that and you can then stop by our lab to have the test done at a later time.  If during the course of the call the physician/provider feels a telephone visit is not appropriate, you will not be charged for this service.\"     Rach Peterson complains of    Chief Complaint   Patient presents with     Prenatal Care     I have reviewed and updated the patient's Past Medical History, Social History, Family History and Medication List.    ALLERGIES  No known drug allergies     29 year old female  at 24w0d called for a E-visit, routine prenatal appointment.  Rach is feeling well.   Denies vaginal bleeding,  leakage of fluid,  contractions or cramping.  + fetal movement, which has decreased her anxiety.        No HA, visual changes, RUQ or epigastric pain.     The patient presents with the following concerns:     - Coronavirus prevention: Rach works for [employer] in the [employer] building. She has been working from home this week and will continue to work from home next week. While at her workplace, states that she is in close proximity to a lot of " people both coworkers and the public, as it is a public building.  Wondering if she should request to work from home longer to avoid exposure. She does not have any travel plans.     Level II US results-  reviewed normal scan; MFM offered f/u U/S at 32 weeks for reassurance given hx of fetal losses.     Hx of anxiety: Much of anxiety is around well-being of baby due to history of fetal losses. States her anxiety has slightly worsened with the concerns around Coronavirus, but she has a therapist that she is seeing regularly and feels she has a system in place to address her anxiety when it flares. Denies need for further intervention today.    Answers for HPI/ROS submitted by the patient on 3/17/2020   MYNOR 7 TOTAL SCORE: 6    Hx of herpes: continues to take Valrex daily suppression therapy       Assessment/Plan:  1. Supervision of other normal pregnancy  - Next visit at 28 weeks, EOB with education, in-person. Counseled on recommendation for Tdap; pt willing to receive this at next visit. Encouraged her , who joined the call, to also check to ensure he has received a Tdap vaccine within the last 10 yrs.  Plan for GCT and labs at next visit.  Return to clinic sooner with any questions/ concerns.   - Reviewed why/how to contact provider.   - Reinforced PTL signs/symptoms and normal fetal movement   - Reinforced COVID-19 precautions including: social distancing of at least 6 feet, avoiding gatherings of 10 persons or more, frequent hand hygiene, avoiding discretionary travel, avoiding touching of face, and cleaning and disinfecting frequently touched surfaces daily.  Encouraged household members to follow the same guidelines.  Counseled patient to call the nurse triage line at [phone] promptly to be directed to the appropriate care if any known or suspected exposure to COVID-19 or development of fever and symptoms, such as cough or difficulty breathing.    Reviewed cohort scheduling of ObGyns, CNMS, and  residents which may cause a change in provider at future scheduled clinic appointments.  Reinforced current hospital policy restricting visitors to 1 healthy adult (age >18)  per day on labor and delivery, postpartum, and NICU.  At present, doulas are NOT excluded from this restriction.  Also, reinforced clinic visitor policy restricting any visitors from attending office visits to limit the spread of COVID-19.      2. Anxiety  - Continue care with therapist. Briefly discussed benefits and risks of medication and that this is an option if her anxiety worsens.  Patient declined need for medication today, but pleased to know this is an option if needed in the future.   - Consider f/u U/S at 32 weeks for reassurance given hx of fetal losses, if patient prefers (see last MFM note)    3. HSV (herpes simplex virus) infection  Continue daily suppression therapy with Valtrex    Pt expressed understanding and agreement with the plan for care.  Phone call duration:  20 minutes  uJlianne Gregory, DNP, APRN, WHNP

## 2020-03-20 NOTE — PROGRESS NOTES
"Rach Peterson is a 29 year old female, , who is being evaluated via a billable telephone visit.      The patient has been notified of following:   \"This telephone visit will be conducted via a call between you and your physician/provider. We have found that certain health care needs can be provided without the need for a physical exam.  This service lets us provide the care you need with a short phone conversation.  If a prescription is necessary we can send it directly to your pharmacy.  If lab work is needed we can place an order for that and you can then stop by our lab to have the test done at a later time.  If during the course of the call the physician/provider feels a telephone visit is not appropriate, you will not be charged for this service.\"     Rach Peterson complains of    Chief Complaint   Patient presents with     Prenatal Care     I have reviewed and updated the patient's Past Medical History, Social History, Family History and Medication List.    ALLERGIES  No known drug allergies     29 year old female  at 24w0d called for a E-visit, routine prenatal appointment.  Rach is feeling well.   Denies vaginal bleeding,  leakage of fluid,  contractions or cramping.  + fetal movement, which has decreased her anxiety.        No HA, visual changes, RUQ or epigastric pain.     The patient presents with the following concerns:     - Coronavirus prevention: Rach works for [employer] in the [employer] building. She has been working from home this week and will continue to work from home next week. While at her workplace, states that she is in close proximity to a lot of people both coworkers and the public, as it is a public building.  Wondering if she should request to work from home longer to avoid exposure. She does not have any travel plans.     Level II US results-  reviewed normal scan; MFM offered f/u U/S at 32 weeks for reassurance given hx of fetal losses.     Hx of " anxiety: Much of anxiety is around well-being of baby due to history of fetal losses. States her anxiety has slightly worsened with the concerns around Coronavirus, but she has a therapist that she is seeing regularly and feels she has a system in place to address her anxiety when it flares. Denies need for further intervention today.    Answers for HPI/ROS submitted by the patient on 3/17/2020   MYNOR 7 TOTAL SCORE: 6    Hx of herpes: continues to take Valrex daily suppression therapy       Assessment/Plan:  1. Supervision of other normal pregnancy  - Next visit at 28 weeks, EOB with education, in-person. Counseled on recommendation for Tdap; pt willing to receive this at next visit. Encouraged her , who joined the call, to also check to ensure he has received a Tdap vaccine within the last 10 yrs.  Plan for GCT and labs at next visit.  Return to clinic sooner with any questions/ concerns.   - Reviewed why/how to contact provider.   - Reinforced PTL signs/symptoms and normal fetal movement   - Reinforced COVID-19 precautions including: social distancing of at least 6 feet, avoiding gatherings of 10 persons or more, frequent hand hygiene, avoiding discretionary travel, avoiding touching of face, and cleaning and disinfecting frequently touched surfaces daily.  Encouraged household members to follow the same guidelines.  Counseled patient to call the nurse triage line at [phone] promptly to be directed to the appropriate care if any known or suspected exposure to COVID-19 or development of fever and symptoms, such as cough or difficulty breathing.    Reviewed cohort scheduling of ObGyns, CNMS, and residents which may cause a change in provider at future scheduled clinic appointments.  Reinforced current hospital policy restricting visitors to 1 healthy adult (age >18)  per day on labor and delivery, postpartum, and NICU.  At present, doulas are NOT excluded from this restriction.  Also, reinforced clinic  visitor policy restricting any visitors from attending office visits to limit the spread of COVID-19.      2. Anxiety  - Continue care with therapist. Briefly discussed benefits and risks of medication and that this is an option if her anxiety worsens.  Patient declined need for medication today, but pleased to know this is an option if needed in the future.   - Consider f/u U/S at 32 weeks for reassurance given hx of fetal losses, if patient prefers (see last MFM note)    3. HSV (herpes simplex virus) infection  Continue daily suppression therapy with Valtrex    Pt expressed understanding and agreement with the plan for care.  Phone call duration:  20 minutes  Julianne Gregory, VIGNESH, APRN, WHNP

## 2020-04-03 ASSESSMENT — ANXIETY QUESTIONNAIRES
2. NOT BEING ABLE TO STOP OR CONTROL WORRYING: SEVERAL DAYS
5. BEING SO RESTLESS THAT IT IS HARD TO SIT STILL: NOT AT ALL
6. BECOMING EASILY ANNOYED OR IRRITABLE: NOT AT ALL
3. WORRYING TOO MUCH ABOUT DIFFERENT THINGS: SEVERAL DAYS
7. FEELING AFRAID AS IF SOMETHING AWFUL MIGHT HAPPEN: MORE THAN HALF THE DAYS
1. FEELING NERVOUS, ANXIOUS, OR ON EDGE: SEVERAL DAYS
GAD7 TOTAL SCORE: 6
7. FEELING AFRAID AS IF SOMETHING AWFUL MIGHT HAPPEN: MORE THAN HALF THE DAYS
4. TROUBLE RELAXING: SEVERAL DAYS
GAD7 TOTAL SCORE: 6

## 2020-04-17 ENCOUNTER — OFFICE VISIT (OUTPATIENT)
Dept: OBGYN | Facility: CLINIC | Age: 30
End: 2020-04-17
Attending: MIDWIFE
Payer: COMMERCIAL

## 2020-04-17 VITALS
BODY MASS INDEX: 25.62 KG/M2 | HEART RATE: 75 BPM | HEIGHT: 70 IN | WEIGHT: 179 LBS | DIASTOLIC BLOOD PRESSURE: 70 MMHG | SYSTOLIC BLOOD PRESSURE: 118 MMHG

## 2020-04-17 DIAGNOSIS — Z34.80 SUPERVISION OF OTHER NORMAL PREGNANCY: Primary | ICD-10-CM

## 2020-04-17 LAB
BASOPHILS # BLD AUTO: 0 10E9/L (ref 0–0.2)
BASOPHILS NFR BLD AUTO: 0.2 %
DEPRECATED CALCIDIOL+CALCIFEROL SERPL-MC: 31 UG/L (ref 20–75)
DIFFERENTIAL METHOD BLD: ABNORMAL
EOSINOPHIL # BLD AUTO: 0.1 10E9/L (ref 0–0.7)
EOSINOPHIL NFR BLD AUTO: 1 %
ERYTHROCYTE [DISTWIDTH] IN BLOOD BY AUTOMATED COUNT: 12.6 % (ref 10–15)
GLUCOSE 1H P 50 G GLC PO SERPL-MCNC: 108 MG/DL (ref 60–129)
HCT VFR BLD AUTO: 33.8 % (ref 35–47)
HGB BLD-MCNC: 11.5 G/DL (ref 11.7–15.7)
IMM GRANULOCYTES # BLD: 0.1 10E9/L (ref 0–0.4)
IMM GRANULOCYTES NFR BLD: 1.1 %
LYMPHOCYTES # BLD AUTO: 1.5 10E9/L (ref 0.8–5.3)
LYMPHOCYTES NFR BLD AUTO: 17.8 %
MCH RBC QN AUTO: 31.5 PG (ref 26.5–33)
MCHC RBC AUTO-ENTMCNC: 34 G/DL (ref 31.5–36.5)
MCV RBC AUTO: 93 FL (ref 78–100)
MONOCYTES # BLD AUTO: 0.8 10E9/L (ref 0–1.3)
MONOCYTES NFR BLD AUTO: 9.5 %
NEUTROPHILS # BLD AUTO: 5.9 10E9/L (ref 1.6–8.3)
NEUTROPHILS NFR BLD AUTO: 70.4 %
NRBC # BLD AUTO: 0 10*3/UL
NRBC BLD AUTO-RTO: 0 /100
PLATELET # BLD AUTO: 207 10E9/L (ref 150–450)
RBC # BLD AUTO: 3.65 10E12/L (ref 3.8–5.2)
T PALLIDUM AB SER QL: NONREACTIVE
WBC # BLD AUTO: 8.4 10E9/L (ref 4–11)

## 2020-04-17 PROCEDURE — 82950 GLUCOSE TEST: CPT | Performed by: ADVANCED PRACTICE MIDWIFE

## 2020-04-17 PROCEDURE — 36415 COLL VENOUS BLD VENIPUNCTURE: CPT | Performed by: ADVANCED PRACTICE MIDWIFE

## 2020-04-17 PROCEDURE — 82306 VITAMIN D 25 HYDROXY: CPT | Performed by: ADVANCED PRACTICE MIDWIFE

## 2020-04-17 PROCEDURE — 90471 IMMUNIZATION ADMIN: CPT | Mod: ZF

## 2020-04-17 PROCEDURE — 86780 TREPONEMA PALLIDUM: CPT | Performed by: ADVANCED PRACTICE MIDWIFE

## 2020-04-17 PROCEDURE — 25000128 H RX IP 250 OP 636: Mod: ZF

## 2020-04-17 PROCEDURE — G0463 HOSPITAL OUTPT CLINIC VISIT: HCPCS | Mod: ZF

## 2020-04-17 PROCEDURE — 85025 COMPLETE CBC W/AUTO DIFF WBC: CPT | Performed by: ADVANCED PRACTICE MIDWIFE

## 2020-04-17 PROCEDURE — 90715 TDAP VACCINE 7 YRS/> IM: CPT | Mod: ZF

## 2020-04-17 RX ORDER — LANOLIN ALCOHOL/MO/W.PET/CERES
1000 CREAM (GRAM) TOPICAL DAILY
Qty: 60 TABLET | Refills: 3 | Status: SHIPPED | OUTPATIENT
Start: 2020-04-17 | End: 2020-08-25

## 2020-04-17 RX ORDER — MULTIVIT WITH MINERALS/LUTEIN
250 TABLET ORAL DAILY
Qty: 60 TABLET | Refills: 3 | Status: SHIPPED | OUTPATIENT
Start: 2020-04-17 | End: 2020-08-25

## 2020-04-17 RX ORDER — FERROUS SULFATE 325(65) MG
325 TABLET ORAL
Qty: 60 TABLET | Refills: 3 | Status: SHIPPED | OUTPATIENT
Start: 2020-04-17 | End: 2020-08-25

## 2020-04-17 ASSESSMENT — ANXIETY QUESTIONNAIRES
3. WORRYING TOO MUCH ABOUT DIFFERENT THINGS: NOT AT ALL
1. FEELING NERVOUS, ANXIOUS, OR ON EDGE: MORE THAN HALF THE DAYS
5. BEING SO RESTLESS THAT IT IS HARD TO SIT STILL: NOT AT ALL
7. FEELING AFRAID AS IF SOMETHING AWFUL MIGHT HAPPEN: MORE THAN HALF THE DAYS
2. NOT BEING ABLE TO STOP OR CONTROL WORRYING: MORE THAN HALF THE DAYS
6. BECOMING EASILY ANNOYED OR IRRITABLE: NOT AT ALL
GAD7 TOTAL SCORE: 8

## 2020-04-17 ASSESSMENT — PATIENT HEALTH QUESTIONNAIRE - PHQ9
SUM OF ALL RESPONSES TO PHQ QUESTIONS 1-9: 5
5. POOR APPETITE OR OVEREATING: MORE THAN HALF THE DAYS

## 2020-04-17 ASSESSMENT — MIFFLIN-ST. JEOR: SCORE: 1622.19

## 2020-04-17 NOTE — LETTER
2020       RE: Rach Peterson   [address] [place] [address]     Dear Colleague,    Thank you for referring your patient, Rach Peterson, to the [facility] at [facility]. Please see a copy of my visit note below.    Subjective:  29 year old, , 28w0d, presents for prenatal visit.   Reports + fetal movement. Denies leaking of fluid or vaginal bleeding. Denies cramping or contractions.   The patient presents with the following concerns:    - Questions re: working from home letter if needed, epidural timing, bottlefeeding & stopping milk supply    PHQ-9 SCORE 10/9/2019 2020   PHQ-9 Total Score 7 5     Education completed today includes breast eding, [facility] hand out , contraception, counting movements, signs of pre-term labor, when to present to birthplace, post partum depression, GBS, getting enough iron and labor induction.  Birth preferences reviewed: would like to try for unmedicated, but open to epidural if needed. Reviewed all options- ambulation, hydrotherapy, position changes, IV meds. Discussed no nitrous at this time.  Labor support:    [place] Feeding plans: Bottlefeeding, d/t mental health concerns  Contraception planned:  condoms  The following labs were ordered today: GCT, CBC w platelets, Vitamin D and Anti-treponema  Water birth consent form was not given.    Blood type:   ABO   Date Value Ref Range Status   2019 A  Final     RH(D)   Date Value Ref Range Status   2019 Pos  Final     Antibody Screen   Date Value Ref Range Status   2019 Neg  Final     - Rhogam was not given.  - TDAP was given.    A/P:  Encounter Diagnosis   Name Primary?     Supervision of other normal pregnancy Yes     Orders Placed This Encounter   Procedures     TDAP VACCINE (BOOSTRIX)     Vitamin D Deficiency     CBC with platelets differential     Treponema Abs w Reflex to RPR and Titer     Glucose 1 Hour      Orders Placed This Encounter   Medications     ferrous sulfate (FEROSUL) 325 (65 Fe) MG tablet     Sig: Take 1 tablet (325 mg) by mouth daily (with breakfast)     Dispense:  60 tablet     Refill:  3     vitamin C (ASCORBIC ACID) 250 MG tablet     Sig: Take 1 tablet (250 mg) by mouth daily     Dispense:  60 tablet     Refill:  3     cyanocobalamin (VITAMIN B-12) 1000 MCG tablet     Sig: Take 1 tablet (1,000 mcg) by mouth daily     Dispense:  60 tablet     Refill:  3     - Reinforced COVID-19 precautions including: social distancing of at least 6 feet, avoiding gatherings of 10 persons or more, frequent hand hygiene, avoiding discretionary travel, avoiding touching of face, and cleaning and disinfecting frequently touched surfaces daily.  Encouraged household members to follow the same guidelines.    - If suspected exposure to COVID-19 or onset of fever and symptoms, such as cough or difficulty breathing visit [email or web address] promptly to be directed to the appropriate care and, if pregnant, call [phone] to notify your healthcare team.       - Reviewed cohort scheduling of ObGyns, CNMS, and residents which may cause a change in provider at future scheduled clinic appointments.    - Reinforced current hospital policy restricting visitors to 1 healthy adult (age >18)  for the entire duration of stay on labor and delivery and postpartum, and one healthy adult at a time in the NICU.  At present, doulas are NOT excluded from this restriction.  Also, reinforced clinic visitor policy restricting any visitors from attending office visits to limit the spread of COVID-19.    - Recommended once daily PO supplements of Ferrous Sulfate 325mg, Vitamin C 250 mg, and Vitamin B12 1000 mcg. Take the 3 supplements together, vitamin C and B12 help with absorption. Rx sent.  - Notified we are no longer able to support waterbirth and not able to offer nitrous oxide during labor    - Reviewed option for work letter if needed  -  Continue scheduled prenatal care, RTC in 3 weeks for phone visit    GUANAKO McneillM

## 2020-04-17 NOTE — NURSING NOTE
Chief Complaint   Patient presents with     Prenatal Care     28w0d       See LIBRADO Juarez 4/17/2020

## 2020-04-17 NOTE — LETTER
April 17, 2020    To Whom It May Concern:    Rach Peterson is being seen in our clinic for prenatal care.      Her Estimated Date of Delivery: Jul 10, 2020.    Due to being considered high risk during the Covid-19 outbreak, we recommend our patient to work from home to reduce risk of exposure.  Please call us with any quesitons or concerns,      Mamadou Horner CNM

## 2020-04-17 NOTE — PROGRESS NOTES
Subjective:  29 year old, , 28w0d, presents for prenatal visit.   Reports + fetal movement. Denies leaking of fluid or vaginal bleeding. Denies cramping or contractions.   The patient presents with the following concerns:    - Questions re: working from home letter if needed, epidural timing, bottlefeeding & stopping milk supply    PHQ-9 SCORE 10/9/2019 2020   PHQ-9 Total Score 7 5     Education completed today includes breast eding, [facility] hand out , contraception, counting movements, signs of pre-term labor, when to present to birthplace, post partum depression, GBS, getting enough iron and labor induction.  Birth preferences reviewed: would like to try for unmedicated, but open to epidural if needed. Reviewed all options- ambulation, hydrotherapy, position changes, IV meds. Discussed no nitrous at this time.  Labor support:     Feeding plans: Bottlefeeding, d/t mental health concerns  Contraception planned:  condoms  The following labs were ordered today: GCT, CBC w platelets, Vitamin D and Anti-treponema  Water birth consent form was not given.    Blood type:   ABO   Date Value Ref Range Status   2019 A  Final     RH(D)   Date Value Ref Range Status   2019 Pos  Final     Antibody Screen   Date Value Ref Range Status   2019 Neg  Final     - Rhogam was not given.  - TDAP was given.    A/P:  Encounter Diagnosis   Name Primary?     Supervision of other normal pregnancy Yes     Orders Placed This Encounter   Procedures     TDAP VACCINE (BOOSTRIX)     Vitamin D Deficiency     CBC with platelets differential     Treponema Abs w Reflex to RPR and Titer     Glucose 1 Hour     Orders Placed This Encounter   Medications     ferrous sulfate (FEROSUL) 325 (65 Fe) MG tablet     Sig: Take 1 tablet (325 mg) by mouth daily (with breakfast)     Dispense:  60 tablet     Refill:  3     vitamin C (ASCORBIC ACID) 250 MG tablet     Sig: Take 1 tablet (250 mg) by mouth daily     Dispense:  60  tablet     Refill:  3     cyanocobalamin (VITAMIN B-12) 1000 MCG tablet     Sig: Take 1 tablet (1,000 mcg) by mouth daily     Dispense:  60 tablet     Refill:  3     - Reinforced COVID-19 precautions including: social distancing of at least 6 feet, avoiding gatherings of 10 persons or more, frequent hand hygiene, avoiding discretionary travel, avoiding touching of face, and cleaning and disinfecting frequently touched surfaces daily.  Encouraged household members to follow the same guidelines.    - If suspected exposure to COVID-19 or onset of fever and symptoms, such as cough or difficulty breathing visit [email or web address] promptly to be directed to the appropriate care and, if pregnant, call [phone] to notify your healthcare team.       - Reviewed cohort scheduling of ObGyns, CNMS, and residents which may cause a change in provider at future scheduled clinic appointments.    - Reinforced current hospital policy restricting visitors to 1 healthy adult (age >18)  for the entire duration of stay on labor and delivery and postpartum, and one healthy adult at a time in the NICU.  At present, doulas are NOT excluded from this restriction.  Also, reinforced clinic visitor policy restricting any visitors from attending office visits to limit the spread of COVID-19.    - Recommended once daily PO supplements of Ferrous Sulfate 325mg, Vitamin C 250 mg, and Vitamin B12 1000 mcg. Take the 3 supplements together, vitamin C and B12 help with absorption. Rx sent.  - Notified we are no longer able to support waterbirth and not able to offer nitrous oxide during labor    - Reviewed option for work letter if needed  - Continue scheduled prenatal care, RTC in 3 weeks for phone visit    GUANAKO Mcneill CNM

## 2020-05-07 ASSESSMENT — ANXIETY QUESTIONNAIRES
3. WORRYING TOO MUCH ABOUT DIFFERENT THINGS: SEVERAL DAYS
GAD7 TOTAL SCORE: 6
5. BEING SO RESTLESS THAT IT IS HARD TO SIT STILL: NOT AT ALL
7. FEELING AFRAID AS IF SOMETHING AWFUL MIGHT HAPPEN: SEVERAL DAYS
2. NOT BEING ABLE TO STOP OR CONTROL WORRYING: SEVERAL DAYS
7. FEELING AFRAID AS IF SOMETHING AWFUL MIGHT HAPPEN: SEVERAL DAYS
GAD7 TOTAL SCORE: 6
1. FEELING NERVOUS, ANXIOUS, OR ON EDGE: SEVERAL DAYS
4. TROUBLE RELAXING: SEVERAL DAYS
6. BECOMING EASILY ANNOYED OR IRRITABLE: SEVERAL DAYS

## 2020-05-08 ENCOUNTER — VIRTUAL VISIT (OUTPATIENT)
Dept: OBGYN | Facility: CLINIC | Age: 30
End: 2020-05-08
Attending: ADVANCED PRACTICE MIDWIFE
Payer: COMMERCIAL

## 2020-05-08 DIAGNOSIS — F41.9 ANXIETY: ICD-10-CM

## 2020-05-08 DIAGNOSIS — Z34.80 SUPERVISION OF OTHER NORMAL PREGNANCY: Primary | ICD-10-CM

## 2020-05-08 DIAGNOSIS — B00.9 HSV (HERPES SIMPLEX VIRUS) INFECTION: ICD-10-CM

## 2020-05-08 ASSESSMENT — ANXIETY QUESTIONNAIRES: GAD7 TOTAL SCORE: 6

## 2020-05-08 NOTE — PROGRESS NOTES
"Rach Peterson is a 29 year old female who is being evaluated via a billable telephone visit.      The patient has been notified of following:     \"This telephone visit will be conducted via a call between you and your physician/provider. We have found that certain health care needs can be provided without the need for a physical exam.  This service lets us provide the care you need with a short phone conversation.  If a prescription is necessary we can send it directly to your pharmacy.  If lab work is needed we can place an order for that and you can then stop by our lab to have the test done at a later time.    If during the course of the call the physician/provider feels a telephone visit is not appropriate, you will not be charged for this service.\"       I have reviewed and updated the patient's Past Medical History, Social History, Family History and Medication List.    ALLERGIES  No known drug allergies    Rach Peterson complains of    Chief Complaint   Patient presents with     Prenatal Care     Subjective:      29 year old  at 31w0d presentst for a routine prenatal appointment.    no vaginal bleeding or leakage of fluid.  no contractions. pos fetal movement.       No HA, visual changes, RUQ or epigastric pain.   Patient concerns: no   Feeling well overall. Some struggles w sleep, discomfort at times  Doing stationary bike for exercise and prenatal yoga  Reviewed EOB labs with patient.  Reviewed TDAP Previously given  Objective:  There were no vitals filed for this visit., see ob flowsheet  Assessment/Plan     Encounter Diagnoses   Name Primary?     Supervision of other normal pregnancy, WHS CNM Yes     HSV (herpes simplex virus) infection      Anxiety      No orders of the defined types were placed in this encounter.    No orders of the defined types were placed in this encounter.    ABO   Date Value Ref Range Status   2019 A  Final     RH(D)   Date Value Ref Range Status "   12/04/2019 Pos  Final     Antibody Screen   Date Value Ref Range Status   12/04/2019 Neg  Final   , Rhogam  was notgiven.    - Reviewed total weight gain, encouraged continued healthy diet and exercise.  .  Reviewed importance of daily fetal kick count and why/how to contact provider.    - Reviewed why/how to contact provider if headache/visual changes/RUQ or epigastric pain, decreased fetal movement, vaginal bleeding, leakage of fluid or more than 4 contractions in an hour.     Patient education/orders or handouts today:  PTL signs/symptoms   Pt did CBE classes already at [facility]      Return to clinic in 3 weeks and prn if questions or concerns.     Ni Boyle, APRN CNM      Phone call duration:  21 minutes          Answers for HPI/ROS submitted by the patient on 5/7/2020   MYNOR 7 TOTAL SCORE: 6

## 2020-05-15 ENCOUNTER — HOSPITAL ENCOUNTER (OUTPATIENT)
Dept: ULTRASOUND IMAGING | Facility: CLINIC | Age: 30
End: 2020-05-15
Attending: OBSTETRICS & GYNECOLOGY
Payer: COMMERCIAL

## 2020-05-15 ENCOUNTER — OFFICE VISIT (OUTPATIENT)
Dept: MATERNAL FETAL MEDICINE | Facility: CLINIC | Age: 30
End: 2020-05-15
Attending: OBSTETRICS & GYNECOLOGY
Payer: COMMERCIAL

## 2020-05-15 DIAGNOSIS — O35.2XX0 HEREDITARY DISEASE IN FAMILY POSSIBLY AFFECTING FETUS, AFFECTING MANAGEMENT OF MOTHER IN PREGNANCY, SINGLE OR UNSPECIFIED FETUS: Primary | ICD-10-CM

## 2020-05-15 DIAGNOSIS — Z82.79 FAMILY HISTORY OF BIRTH DEFECT: ICD-10-CM

## 2020-05-15 PROCEDURE — 76816 OB US FOLLOW-UP PER FETUS: CPT

## 2020-05-15 NOTE — PROGRESS NOTES
Please see full imaging report from [facility] program under imaging tab.    Normal fetal growth and follow up anatomy. No further MFM follow up indicated at this time.     Yas Oglesby MD  Maternal Fetal Medicine

## 2020-05-28 ASSESSMENT — ANXIETY QUESTIONNAIRES
6. BECOMING EASILY ANNOYED OR IRRITABLE: SEVERAL DAYS
5. BEING SO RESTLESS THAT IT IS HARD TO SIT STILL: NOT AT ALL
1. FEELING NERVOUS, ANXIOUS, OR ON EDGE: SEVERAL DAYS
3. WORRYING TOO MUCH ABOUT DIFFERENT THINGS: SEVERAL DAYS
4. TROUBLE RELAXING: SEVERAL DAYS
GAD7 TOTAL SCORE: 6
2. NOT BEING ABLE TO STOP OR CONTROL WORRYING: SEVERAL DAYS
7. FEELING AFRAID AS IF SOMETHING AWFUL MIGHT HAPPEN: SEVERAL DAYS
7. FEELING AFRAID AS IF SOMETHING AWFUL MIGHT HAPPEN: SEVERAL DAYS

## 2020-05-29 ENCOUNTER — VIRTUAL VISIT (OUTPATIENT)
Dept: OBGYN | Facility: CLINIC | Age: 30
End: 2020-05-29
Attending: ADVANCED PRACTICE MIDWIFE
Payer: COMMERCIAL

## 2020-05-29 DIAGNOSIS — Z34.80 SUPERVISION OF OTHER NORMAL PREGNANCY: Primary | ICD-10-CM

## 2020-05-29 ASSESSMENT — ANXIETY QUESTIONNAIRES: GAD7 TOTAL SCORE: 6

## 2020-05-29 NOTE — LETTER
"2020     RE: Rach Peterson  [address] [place] [address]     Dear Colleague,    Thank you for referring your patient, Rach Peterson, to the [facility] at [facility]. Please see a copy of my visit note below.    The patient has been notified of the following:      \"We have found that certain health care needs can be provided without the need for a face to face visit.  This service lets us provide the care you need with a phone conversation.       I will have full access to your [facility] medical record during this entire phone call.   I will be taking notes for your medical record.      Since this is like an office visit, we will bill your insurance company for this service.       There are potential benefits and risks of telephone visits (e.g. limits to patient confidentiality) that differ from in-person visits.?  Confidentiality still applies for telephone services, and nobody will record the visit.  It is important to be in a quiet, private space that is free of distractions (including cell phone or other devices) during the visit.??      If during the course of the call I believe a telephone visit is not appropriate, you will not be charged for this service\"     Consent has been obtained for this service by care team member: Yes     Subjective:      29 year old  at 34w0d presentst for a routine prenatal appointment.   Denies vaginal bleeding or leakage of fluid.  Denies contractions. + fetal movement.      No HA, visual changes, RUQ or epigastric pain.   Patient concerns:  Feeling well overall.   - Questions re: COVID19 and visitors after baby is born, pediatrician, and last ultrasound growth measurements    Objective:  Sounds well, no apparent distress  See ob flowsheet    Assessment/Plan:  Encounter Diagnosis   Name Primary?     Supervision of other normal pregnancy, WHS CNM Yes     - Reviewed total weight gain, " encouraged continued healthy diet and exercise.    - Reviewed importance of daily fetal kick count and why/how to contact provider.  - Reviewed why/how to contact provider if headache/visual changes/RUQ or epigastric pain, decreased fetal movement, vaginal bleeding, leakage of fluid or more than 4 contractions in an hour.  - Patient education/orders or handouts today: PTL signs/symptoms   - Discussed recommendations for visitors after birth and strict restrictions or precautions to take for visitors.   - Return to clinic in 2 weeks and prn if questions or concerns.     GUANAKO Mcneill CNNAEEM    Call start: 1:00PM  Call finish: 1:12PM  Duration of call: 12 minutes  Answers for HPI/ROS submitted by the patient on 5/29/2020   MYNOR 7 TOTAL SCORE: 6

## 2020-06-01 ASSESSMENT — ANXIETY QUESTIONNAIRES
3. WORRYING TOO MUCH ABOUT DIFFERENT THINGS: SEVERAL DAYS
GAD7 TOTAL SCORE: 6
5. BEING SO RESTLESS THAT IT IS HARD TO SIT STILL: NOT AT ALL
GAD7 TOTAL SCORE: 6
7. FEELING AFRAID AS IF SOMETHING AWFUL MIGHT HAPPEN: SEVERAL DAYS
2. NOT BEING ABLE TO STOP OR CONTROL WORRYING: SEVERAL DAYS
6. BECOMING EASILY ANNOYED OR IRRITABLE: SEVERAL DAYS
1. FEELING NERVOUS, ANXIOUS, OR ON EDGE: SEVERAL DAYS
7. FEELING AFRAID AS IF SOMETHING AWFUL MIGHT HAPPEN: SEVERAL DAYS
4. TROUBLE RELAXING: SEVERAL DAYS

## 2020-06-02 ASSESSMENT — ANXIETY QUESTIONNAIRES: GAD7 TOTAL SCORE: 6

## 2020-06-12 ENCOUNTER — OFFICE VISIT (OUTPATIENT)
Dept: OBGYN | Facility: CLINIC | Age: 30
End: 2020-06-12
Attending: ADVANCED PRACTICE MIDWIFE
Payer: COMMERCIAL

## 2020-06-12 VITALS
SYSTOLIC BLOOD PRESSURE: 137 MMHG | WEIGHT: 191 LBS | HEART RATE: 85 BPM | DIASTOLIC BLOOD PRESSURE: 87 MMHG | BODY MASS INDEX: 27.41 KG/M2

## 2020-06-12 DIAGNOSIS — Z34.80 SUPERVISION OF OTHER NORMAL PREGNANCY: Primary | ICD-10-CM

## 2020-06-12 PROCEDURE — 87653 STREP B DNA AMP PROBE: CPT | Performed by: ADVANCED PRACTICE MIDWIFE

## 2020-06-12 PROCEDURE — G0463 HOSPITAL OUTPT CLINIC VISIT: HCPCS | Mod: ZF

## 2020-06-12 PROCEDURE — 87186 SC STD MICRODIL/AGAR DIL: CPT | Performed by: ADVANCED PRACTICE MIDWIFE

## 2020-06-12 RX ORDER — ACETAMINOPHEN 325 MG/1
650 TABLET ORAL EVERY 6 HOURS PRN
Qty: 100 TABLET | Refills: 0 | Status: SHIPPED | OUTPATIENT
Start: 2020-06-12 | End: 2020-08-25

## 2020-06-12 RX ORDER — AMOXICILLIN 250 MG
1 CAPSULE ORAL DAILY
Qty: 100 TABLET | Refills: 0 | Status: SHIPPED | OUTPATIENT
Start: 2020-06-12 | End: 2020-08-25

## 2020-06-12 RX ORDER — IBUPROFEN 600 MG/1
600 TABLET, FILM COATED ORAL EVERY 6 HOURS PRN
Qty: 60 TABLET | Refills: 0 | Status: SHIPPED | OUTPATIENT
Start: 2020-06-12 | End: 2020-07-03

## 2020-06-12 ASSESSMENT — PAIN SCALES - GENERAL: PAINLEVEL: NO PAIN (0)

## 2020-06-12 NOTE — NURSING NOTE
Chief Complaint   Patient presents with     Prenatal Care     CHAPARRO 36 weeks   Claudine Lock LPN

## 2020-06-12 NOTE — LETTER
2020       RE: Rach Peterson   [address]  [place] [address]     Dear Colleague,    Thank you for referring your patient, Rach Peterson, to the [facility] at [facility]. Please see a copy of my visit note below.    Subjective:      29 year old  at 36w0d presents for a routine prenatal appointment.    Denies cramping/contractions, vaginal bleeding, discharge or leakage of fluid. Reports +fetal movement.  No HA, vision changes, ruq/epigastric pain.      Patient concerns:  Feeling well overall. Has been experiencing more heartburn. Inquiring if she can take Tums.  Questions re: arriving to L&D when in labor.    Has hx of genital herpes. No active lesions or prodromal symptoms. Take 500mg Valtrex qday.     Objective:  Vitals:    20 1315   BP: 137/87   Pulse: 85   Weight: 86.6 kg (191 lb)      See OB flowsheet    Uncertain presentation    Assessment/Plan     Encounter Diagnosis   Name Primary?     Supervision of other normal pregnancy, WHS CNM Yes     Orders Placed This Encounter   Procedures     US OB >14 Weeks Limited wo Fetal Measurement     Orders Placed This Encounter   Medications     acetaminophen (TYLENOL) 325 MG tablet     Sig: Take 2 tablets (650 mg) by mouth every 6 hours as needed for mild pain Start after Delivery.     Dispense:  100 tablet     Refill:  0     senna-docusate (SENOKOT-S/PERICOLACE) 8.6-50 MG tablet     Sig: Take 1 tablet by mouth daily Start after delivery.     Dispense:  100 tablet     Refill:  0     ibuprofen (ADVIL/MOTRIN) 600 MG tablet     Sig: Take 1 tablet (600 mg) by mouth every 6 hours as needed for moderate pain Start after delivery     Dispense:  60 tablet     Refill:  0     Labor signs discussed. Reinforced daily fetal movement counts.  Reviewed why/how to contact provider if headache/visual changes/RUQ or epigastric pain, decreased fetal movement, vaginal bleeding, leakage of  fluid.    - GBS obtained.  - Deferred cbc with plts. Continue iron, vitamin c, b12 supplementation.  - Reviewed recommended postpartum OTC medication. Pt desires prescription. Prescription for tylenol, motrin and senna-docusate sent to pt's preferred pharmacy. Reviewed no use of motrin during pregnancy.   - Recommended patient increase Valtrex dosage to 500mg twice a day for prophylaxis. Instructed to notify provider if active lesion or prodromal symptoms.   Plan BLE on labor admission.   - Difficult to palpate fetal presentation via leopolds. Offered 2:30 ultrasound appt today. Pt prefers us next week.  Order placed. Will schedule for US and CHAPARRO next week.     GUANAKO Agrawal, CHAMPM

## 2020-06-12 NOTE — PROGRESS NOTES
Subjective:      29 year old  at 36w0d presents for a routine prenatal appointment.    Denies cramping/contractions, vaginal bleeding, discharge or leakage of fluid. Reports +fetal movement.  No HA, vision changes, ruq/epigastric pain.      Patient concerns:  Feeling well overall. Has been experiencing more heartburn. Inquiring if she can take Tums.  Questions re: arriving to L&D when in labor.    Has hx of genital herpes. No active lesions or prodromal symptoms. Take 500mg Valtrex qday.     Objective:  Vitals:    20 1315   BP: 137/87   Pulse: 85   Weight: 86.6 kg (191 lb)      See OB flowsheet    Uncertain presentation    Assessment/Plan     Encounter Diagnosis   Name Primary?     Supervision of other normal pregnancy, WHS CNM Yes     Orders Placed This Encounter   Procedures     US OB >14 Weeks Limited wo Fetal Measurement     Orders Placed This Encounter   Medications     acetaminophen (TYLENOL) 325 MG tablet     Sig: Take 2 tablets (650 mg) by mouth every 6 hours as needed for mild pain Start after Delivery.     Dispense:  100 tablet     Refill:  0     senna-docusate (SENOKOT-S/PERICOLACE) 8.6-50 MG tablet     Sig: Take 1 tablet by mouth daily Start after delivery.     Dispense:  100 tablet     Refill:  0     ibuprofen (ADVIL/MOTRIN) 600 MG tablet     Sig: Take 1 tablet (600 mg) by mouth every 6 hours as needed for moderate pain Start after delivery     Dispense:  60 tablet     Refill:  0     Labor signs discussed. Reinforced daily fetal movement counts.  Reviewed why/how to contact provider if headache/visual changes/RUQ or epigastric pain, decreased fetal movement, vaginal bleeding, leakage of fluid.    - GBS obtained.  - Deferred cbc with plts. Continue iron, vitamin c, b12 supplementation.  - Reviewed recommended postpartum OTC medication. Pt desires prescription. Prescription for tylenol, motrin and senna-docusate sent to pt's preferred pharmacy. Reviewed no use of motrin during pregnancy.   -  Recommended patient increase Valtrex dosage to 500mg twice a day for prophylaxis. Instructed to notify provider if active lesion or prodromal symptoms.   Plan BLE on labor admission.   - Difficult to palpate fetal presentation via leopolds. Offered 2:30 ultrasound appt today. Pt prefers us next week.  Order placed. Will schedule for US and CHAPARRO next week.     GUANAKO Agrawal, CHAMPM

## 2020-06-13 LAB
GP B STREP DNA SPEC QL NAA+PROBE: POSITIVE
SPECIMEN SOURCE: ABNORMAL

## 2020-06-15 PROBLEM — O99.820 GBS (GROUP B STREPTOCOCCUS CARRIER), +RV CULTURE, CURRENTLY PREGNANT: Status: ACTIVE | Noted: 2020-06-15

## 2020-06-16 LAB
BACTERIA SPEC CULT: ABNORMAL
SPECIMEN SOURCE: ABNORMAL

## 2020-06-16 ASSESSMENT — ANXIETY QUESTIONNAIRES
3. WORRYING TOO MUCH ABOUT DIFFERENT THINGS: MORE THAN HALF THE DAYS
6. BECOMING EASILY ANNOYED OR IRRITABLE: MORE THAN HALF THE DAYS
2. NOT BEING ABLE TO STOP OR CONTROL WORRYING: MORE THAN HALF THE DAYS
1. FEELING NERVOUS, ANXIOUS, OR ON EDGE: MORE THAN HALF THE DAYS
GAD7 TOTAL SCORE: 12
GAD7 TOTAL SCORE: 12
7. FEELING AFRAID AS IF SOMETHING AWFUL MIGHT HAPPEN: MORE THAN HALF THE DAYS
5. BEING SO RESTLESS THAT IT IS HARD TO SIT STILL: NOT AT ALL
4. TROUBLE RELAXING: MORE THAN HALF THE DAYS
7. FEELING AFRAID AS IF SOMETHING AWFUL MIGHT HAPPEN: MORE THAN HALF THE DAYS

## 2020-06-17 ENCOUNTER — ANCILLARY PROCEDURE (OUTPATIENT)
Dept: ULTRASOUND IMAGING | Facility: CLINIC | Age: 30
End: 2020-06-17
Attending: ADVANCED PRACTICE MIDWIFE
Payer: COMMERCIAL

## 2020-06-17 ENCOUNTER — OFFICE VISIT (OUTPATIENT)
Dept: OBGYN | Facility: CLINIC | Age: 30
End: 2020-06-17
Attending: MIDWIFE
Payer: COMMERCIAL

## 2020-06-17 VITALS
SYSTOLIC BLOOD PRESSURE: 133 MMHG | DIASTOLIC BLOOD PRESSURE: 88 MMHG | HEART RATE: 72 BPM | BODY MASS INDEX: 27.69 KG/M2 | WEIGHT: 193 LBS

## 2020-06-17 DIAGNOSIS — Z34.80 SUPERVISION OF OTHER NORMAL PREGNANCY: ICD-10-CM

## 2020-06-17 DIAGNOSIS — F41.9 ANXIETY: ICD-10-CM

## 2020-06-17 DIAGNOSIS — B00.9 HSV (HERPES SIMPLEX VIRUS) INFECTION: ICD-10-CM

## 2020-06-17 DIAGNOSIS — O99.820 GBS (GROUP B STREPTOCOCCUS CARRIER), +RV CULTURE, CURRENTLY PREGNANT: Primary | ICD-10-CM

## 2020-06-17 PROCEDURE — G0463 HOSPITAL OUTPT CLINIC VISIT: HCPCS | Mod: ZF

## 2020-06-17 PROCEDURE — 76815 OB US LIMITED FETUS(S): CPT

## 2020-06-17 ASSESSMENT — ANXIETY QUESTIONNAIRES: GAD7 TOTAL SCORE: 12

## 2020-06-17 ASSESSMENT — PAIN SCALES - GENERAL: PAINLEVEL: NO PAIN (0)

## 2020-06-17 NOTE — LETTER
2020       RE: Rach Peterson  [address]  [place] [address]     Dear Colleague,    Thank you for referring your patient, Rach Peterson, to the [facility] at [facility]. Please see a copy of my visit note below.    Answers for HPI/ROS submitted by the patient on 2020   MYNOR 7 TOTAL SCORE: 12    Subjective:     29 year old  at 36w4d presents for a routine prenatal appointment.  No vaginal bleeding,  leakage of fluid, or change in vaginal discharge.  No contractions.  No fetal movement.       No HA, visual changes, RUQ or epigastric pain.   Patient concerns: Feeling well overall.     - Reviewed GBS positive results and treatment during active labor or ROM.  - US shows cephalic, normal fluid.   - Anxiety- discussed that she went to a pregnancy loss grief group after her 16 week loss. Was really helpful, but now feels she heard so many awful stories that it is causing more anxiety. Doing prenatal yoga for mindfulness practice.    - Taking prophylactic dose of Valtrex    Objective:  Vitals:    20 0946   BP: 133/88   Pulse: 72   Weight: 87.5 kg (193 lb)    See OB flowsheet    Assessment/Plan     Encounter Diagnoses   Name Primary?     GBS (group B Streptococcus carrier), +RV culture, currently pregnant Yes     Supervision of other normal pregnancy, WHS CNM      Anxiety      HSV (herpes simplex virus) infection      No orders of the defined types were placed in this encounter.      PHQ-9 SCORE 10/9/2019 2020   PHQ-9 Total Score 7 5     PHQ 10/9/2019 2020   PHQ-9 Total Score 7 5   Q9: Thoughts of better off dead/self-harm past 2 weeks Not at all Not at all     GBS screening: positive  Labor signs discussed. ROM signs discussed.   Reinforced daily fetal movement counts.  Reviewed why/how to contact provider if headache/visual changes/RUQ or epigastric pain, sudden onset severe N&V or hand face swelling,  decreased fetal movement, vaginal bleeding, leakage of fluid.    Return for visit in 1 week, prefers in clinic visit   Call prn if questions or concerns.     GUANAKO LemaM

## 2020-06-17 NOTE — NURSING NOTE
Chief Complaint   Patient presents with     Prenatal Care     CHAPARRO 36 weeks and 5 days   Claudine Lock LPN

## 2020-06-17 NOTE — PROGRESS NOTES
Answers for HPI/ROS submitted by the patient on 2020   MYNOR 7 TOTAL SCORE: 12    Subjective:     29 year old  at 36w4d presents for a routine prenatal appointment.  No vaginal bleeding,  leakage of fluid, or change in vaginal discharge.  No contractions.  No fetal movement.       No HA, visual changes, RUQ or epigastric pain.   Patient concerns: Feeling well overall.     - Reviewed GBS positive results and treatment during active labor or ROM.  - US shows cephalic, normal fluid.   - Anxiety- discussed that she went to a pregnancy loss grief group after her 16 week loss. Was really helpful, but now feels she heard so many awful stories that it is causing more anxiety. Doing prenatal yoga for mindfulness practice.    - Taking prophylactic dose of Valtrex    Objective:  Vitals:    20 0946   BP: 133/88   Pulse: 72   Weight: 87.5 kg (193 lb)    See OB flowsheet    Assessment/Plan     Encounter Diagnoses   Name Primary?     GBS (group B Streptococcus carrier), +RV culture, currently pregnant Yes     Supervision of other normal pregnancy, WHS CNM      Anxiety      HSV (herpes simplex virus) infection      No orders of the defined types were placed in this encounter.      PHQ-9 SCORE 10/9/2019 2020   PHQ-9 Total Score 7 5     PHQ 10/9/2019 2020   PHQ-9 Total Score 7 5   Q9: Thoughts of better off dead/self-harm past 2 weeks Not at all Not at all     GBS screening: positive  Labor signs discussed. ROM signs discussed.   Reinforced daily fetal movement counts.  Reviewed why/how to contact provider if headache/visual changes/RUQ or epigastric pain, sudden onset severe N&V or hand face swelling, decreased fetal movement, vaginal bleeding, leakage of fluid.    Return for visit in 1 week, prefers in clinic visit   Call prn if questions or concerns.     GUANAKO Lema CNM

## 2020-06-25 NOTE — PROGRESS NOTES
"Answers for HPI/ROS submitted by the patient on 2020   MYNOR 7 TOTAL SCORE: 12  Subjective:     29 year old  at 38w0d presents for routine prenatal visit.   No vaginal bleeding or leakage of fluid.  No contractions.  Good fetal movement, some uncomfortable strong kicks.    No HA, visual changes, RUQ or epigastric pain.   Patient concerns: Feeling well overall.  - Noticing hands feel tight in morning, then better after moving stretching.      - Is taking suppressive HSV medication   - Not planning to breast feed due to mental health concerns. Answered questions about bottles/formula in hospital.     Objective:  Vitals:    20 0905   BP: 130/88   Pulse: 83   Weight: 87.2 kg (192 lb 4.8 oz)   Height: 1.778 m (5' 10\")    See OB flowsheet  Assessment/Plan     Encounter Diagnoses   Name Primary?     Supervision of other normal pregnancy, WHS CNM Yes     GBS (group B Streptococcus carrier), +RV culture, currently pregnant      HSV (herpes simplex virus) infection      Anxiety      No orders of the defined types were placed in this encounter.    - Reviewed postdates testing including BPP => 41 weeks and rationale for induction of labor based on results.   Patient desires induction at 41 weeks.   - Reviewed why/how to contact provider if headache/visual changes/RUQ or epigastric pain, decreased fetal movement, vaginal bleeding, leakage of fluid or strong/regular contractions.   Patient education/orders or handouts today:  Sign/symptoms of labor and When to call for labor or other concerns    Return to clinic in 1 week and prn if questions or concerns.   GUANAKO Lema CNM    "

## 2020-06-26 ENCOUNTER — OFFICE VISIT (OUTPATIENT)
Dept: OBGYN | Facility: CLINIC | Age: 30
End: 2020-06-26
Attending: MIDWIFE
Payer: COMMERCIAL

## 2020-06-26 VITALS
WEIGHT: 192.3 LBS | DIASTOLIC BLOOD PRESSURE: 88 MMHG | HEART RATE: 83 BPM | HEIGHT: 70 IN | SYSTOLIC BLOOD PRESSURE: 130 MMHG | BODY MASS INDEX: 27.53 KG/M2

## 2020-06-26 DIAGNOSIS — B00.9 HSV (HERPES SIMPLEX VIRUS) INFECTION: ICD-10-CM

## 2020-06-26 DIAGNOSIS — O99.820 GBS (GROUP B STREPTOCOCCUS CARRIER), +RV CULTURE, CURRENTLY PREGNANT: ICD-10-CM

## 2020-06-26 DIAGNOSIS — Z34.80 SUPERVISION OF OTHER NORMAL PREGNANCY: Primary | ICD-10-CM

## 2020-06-26 DIAGNOSIS — F41.9 ANXIETY: ICD-10-CM

## 2020-06-26 PROCEDURE — G0463 HOSPITAL OUTPT CLINIC VISIT: HCPCS | Mod: ZF

## 2020-06-26 ASSESSMENT — PAIN SCALES - GENERAL: PAINLEVEL: NO PAIN (0)

## 2020-06-26 ASSESSMENT — MIFFLIN-ST. JEOR: SCORE: 1682.52

## 2020-06-26 NOTE — LETTER
"2020       RE: Rach Peterson   [address] [place] [address]     Dear Colleague,    Thank you for referring your patient, Rach Peterson, to the [facility] at [facility]. Please see a copy of my visit note below.    Answers for HPI/ROS submitted by the patient on 2020   MYNOR 7 TOTAL SCORE: 12  Subjective:     29 year old  at 38w0d presents for routine prenatal visit.   No vaginal bleeding or leakage of fluid.  No contractions.  Good fetal movement, some uncomfortable strong kicks.    No HA, visual changes, RUQ or epigastric pain.   Patient concerns: Feeling well overall.  - Noticing hands feel tight in morning, then better after moving stretching.      - Is taking suppressive HSV medication   - Not planning to breast feed due to mental health concerns. Answered questions about bottles/formula in hospital.     Objective:  Vitals:    20 0905   BP: 130/88   Pulse: 83   Weight: 87.2 kg (192 lb 4.8 oz)   Height: 1.778 m (5' 10\")    See OB flowsheet  Assessment/Plan     Encounter Diagnoses   Name Primary?     Supervision of other normal pregnancy, S CNM Yes     GBS (group B Streptococcus carrier), +RV culture, currently pregnant      HSV (herpes simplex virus) infection      Anxiety      No orders of the defined types were placed in this encounter.    - Reviewed postdates testing including BPP => 41 weeks and rationale for induction of labor based on results.   Patient desires induction at 41 weeks.   - Reviewed why/how to contact provider if headache/visual changes/RUQ or epigastric pain, decreased fetal movement, vaginal bleeding, leakage of fluid or strong/regular contractions.   Patient education/orders or handouts today:  Sign/symptoms of labor and When to call for labor or other concerns    Return to clinic in 1 week and prn if questions or concerns.   GUANAKO LemaM        " no

## 2020-06-29 ASSESSMENT — ANXIETY QUESTIONNAIRES
7. FEELING AFRAID AS IF SOMETHING AWFUL MIGHT HAPPEN: MORE THAN HALF THE DAYS
5. BEING SO RESTLESS THAT IT IS HARD TO SIT STILL: NOT AT ALL
6. BECOMING EASILY ANNOYED OR IRRITABLE: MORE THAN HALF THE DAYS
1. FEELING NERVOUS, ANXIOUS, OR ON EDGE: MORE THAN HALF THE DAYS
GAD7 TOTAL SCORE: 12
3. WORRYING TOO MUCH ABOUT DIFFERENT THINGS: MORE THAN HALF THE DAYS
4. TROUBLE RELAXING: MORE THAN HALF THE DAYS
2. NOT BEING ABLE TO STOP OR CONTROL WORRYING: MORE THAN HALF THE DAYS
4. TROUBLE RELAXING: MORE THAN HALF THE DAYS
1. FEELING NERVOUS, ANXIOUS, OR ON EDGE: MORE THAN HALF THE DAYS
2. NOT BEING ABLE TO STOP OR CONTROL WORRYING: MORE THAN HALF THE DAYS
6. BECOMING EASILY ANNOYED OR IRRITABLE: MORE THAN HALF THE DAYS
5. BEING SO RESTLESS THAT IT IS HARD TO SIT STILL: NOT AT ALL
7. FEELING AFRAID AS IF SOMETHING AWFUL MIGHT HAPPEN: MORE THAN HALF THE DAYS
3. WORRYING TOO MUCH ABOUT DIFFERENT THINGS: MORE THAN HALF THE DAYS
7. FEELING AFRAID AS IF SOMETHING AWFUL MIGHT HAPPEN: MORE THAN HALF THE DAYS
7. FEELING AFRAID AS IF SOMETHING AWFUL MIGHT HAPPEN: MORE THAN HALF THE DAYS
GAD7 TOTAL SCORE: 12

## 2020-06-30 ASSESSMENT — ANXIETY QUESTIONNAIRES
GAD7 TOTAL SCORE: 12
GAD7 TOTAL SCORE: 12

## 2020-07-02 NOTE — PROGRESS NOTES
"Subjective:     29 year old  at 39w0d presents for routine prenatal visit.       Denies vaginal bleeding or leakage of fluid.  Denies contractions.  Endorses fetal movement.        No HA, visual changes, RUQ or epigastric pain.   Patient concerns: Worried about something going wrong with baby - anxiety has been worse for past week. Has taken child birth ed classes.  Sees her therapist every other week.   Feeling well overall.     Objective:  Vitals:    20 0954   BP: 132/87   BP Location: Left arm   Patient Position: Chair   Pulse: 62   Weight: 86.6 kg (190 lb 14.4 oz)   Height: 1.778 m (5' 10\")   See OB flowsheet    Assessment/Plan     Encounter Diagnoses   Name Primary?     Supervision of other normal pregnancy, WHS CNM Yes     HSV (herpes simplex virus) infection      Anxiety      GBS (group B Streptococcus carrier), +RV culture, currently pregnant      - Discussed MBSR, EOs, somatic therapy, increasing therapy visits.   - Reviewed postdates testing including BPP => 41 weeks and rationale for induction of labor based on results.   Patient desires induction.  - Reviewed why/how to contact provider if headache/visual changes/RUQ or epigastric pain, decreased fetal movement, vaginal bleeding, leakage of fluid or strong/regular contractions.   Patient education/orders or handouts today:  Sign/symptoms of labor, Postdates testing discussion, NST and Induction of labor  Return to clinic in 1 week and prn if questions or concerns.     Monserrat Madison CNM        Answers for HPI/ROS submitted by the patient on 2020   MYNOR 7 TOTAL SCORE: 12    "

## 2020-07-03 ENCOUNTER — OFFICE VISIT (OUTPATIENT)
Dept: OBGYN | Facility: CLINIC | Age: 30
End: 2020-07-03
Attending: ADVANCED PRACTICE MIDWIFE
Payer: COMMERCIAL

## 2020-07-03 VITALS
WEIGHT: 190.9 LBS | HEART RATE: 62 BPM | DIASTOLIC BLOOD PRESSURE: 87 MMHG | SYSTOLIC BLOOD PRESSURE: 132 MMHG | HEIGHT: 70 IN | BODY MASS INDEX: 27.33 KG/M2

## 2020-07-03 DIAGNOSIS — B00.9 HSV (HERPES SIMPLEX VIRUS) INFECTION: ICD-10-CM

## 2020-07-03 DIAGNOSIS — F41.9 ANXIETY: ICD-10-CM

## 2020-07-03 DIAGNOSIS — O99.820 GBS (GROUP B STREPTOCOCCUS CARRIER), +RV CULTURE, CURRENTLY PREGNANT: ICD-10-CM

## 2020-07-03 DIAGNOSIS — Z34.80 SUPERVISION OF OTHER NORMAL PREGNANCY: Primary | ICD-10-CM

## 2020-07-03 PROCEDURE — G0463 HOSPITAL OUTPT CLINIC VISIT: HCPCS | Mod: ZF

## 2020-07-03 ASSESSMENT — MIFFLIN-ST. JEOR: SCORE: 1676.17

## 2020-07-03 NOTE — NURSING NOTE
Chief Complaint   Patient presents with     Prenatal Care     39w0d       See LIBRADO Juarez 7/3/2020

## 2020-07-03 NOTE — LETTER
"7/3/2020       RE: Rach Peterson   [address] [place] [address]     Dear Colleague,    Thank you for referring your patient, Rach Peterson, to the [facility] at [facility]. Please see a copy of my visit note below.    Subjective:     29 year old  at 39w0d presents for routine prenatal visit.       Denies vaginal bleeding or leakage of fluid.  Denies contractions.  Endorses fetal movement.        No HA, visual changes, RUQ or epigastric pain.   Patient concerns: Worried about something going wrong with baby - anxiety has been worse for past week. Has taken child birth ed classes.  Sees her therapist every other week.   Feeling well overall.     Objective:  Vitals:    20 0954   BP: 132/87   BP Location: Left arm   Patient Position: Chair   Pulse: 62   Weight: 86.6 kg (190 lb 14.4 oz)   Height: 1.778 m (5' 10\")   See OB flowsheet    Assessment/Plan     Encounter Diagnoses   Name Primary?     Supervision of other normal pregnancy, [facility] CNM Yes     HSV (herpes simplex virus) infection      Anxiety      GBS (group B Streptococcus carrier), +RV culture, currently pregnant      - Discussed MBSR, EOs, somatic therapy, increasing therapy visits.   - Reviewed postdates testing including BPP => 41 weeks and rationale for induction of labor based on results.   Patient desires induction.  - Reviewed why/how to contact provider if headache/visual changes/RUQ or epigastric pain, decreased fetal movement, vaginal bleeding, leakage of fluid or strong/regular contractions.   Patient education/orders or handouts today:  Sign/symptoms of labor, Postdates testing discussion, NST and Induction of labor  Return to clinic in 1 week and prn if questions or concerns.     Monserrat Madison CNM        Answers for HPI/ROS submitted by the patient on 2020   MYNOR 7 TOTAL SCORE: 12            "

## 2020-07-10 ENCOUNTER — HOSPITAL ENCOUNTER (INPATIENT)
Facility: CLINIC | Age: 30
LOS: 5 days | Discharge: HOME | End: 2020-07-15
Attending: ADVANCED PRACTICE MIDWIFE | Admitting: ADVANCED PRACTICE MIDWIFE
Payer: COMMERCIAL

## 2020-07-10 ENCOUNTER — OFFICE VISIT (OUTPATIENT)
Dept: OBGYN | Facility: CLINIC | Age: 30
End: 2020-07-10
Attending: ADVANCED PRACTICE MIDWIFE
Payer: COMMERCIAL

## 2020-07-10 VITALS
BODY MASS INDEX: 27.17 KG/M2 | DIASTOLIC BLOOD PRESSURE: 93 MMHG | WEIGHT: 189.8 LBS | SYSTOLIC BLOOD PRESSURE: 144 MMHG | HEIGHT: 70 IN | HEART RATE: 74 BPM

## 2020-07-10 DIAGNOSIS — O99.820 GBS (GROUP B STREPTOCOCCUS CARRIER), +RV CULTURE, CURRENTLY PREGNANT: ICD-10-CM

## 2020-07-10 DIAGNOSIS — R03.0 ELEVATED BP WITHOUT DIAGNOSIS OF HYPERTENSION: ICD-10-CM

## 2020-07-10 DIAGNOSIS — F41.9 ANXIETY: ICD-10-CM

## 2020-07-10 DIAGNOSIS — B00.9 HSV (HERPES SIMPLEX VIRUS) INFECTION: ICD-10-CM

## 2020-07-10 DIAGNOSIS — Z34.80 SUPERVISION OF OTHER NORMAL PREGNANCY: Primary | ICD-10-CM

## 2020-07-10 PROBLEM — O09.299 HISTORY OF FETAL ANOMALY IN PRIOR PREGNANCY, CURRENTLY PREGNANT: Status: ACTIVE | Noted: 2020-07-10

## 2020-07-10 LAB
ABO + RH BLD: NORMAL
ABO + RH BLD: NORMAL
ALT SERPL W P-5'-P-CCNC: 91 U/L (ref 0–50)
ALT SERPL W P-5'-P-CCNC: 91 U/L (ref 0–50)
ANION GAP SERPL CALCULATED.3IONS-SCNC: 7 MMOL/L (ref 3–14)
ANION GAP SERPL CALCULATED.3IONS-SCNC: 8 MMOL/L (ref 3–14)
AST SERPL W P-5'-P-CCNC: 62 U/L (ref 0–45)
AST SERPL W P-5'-P-CCNC: 64 U/L (ref 0–45)
BLD GP AB SCN SERPL QL: NORMAL
BLOOD BANK CMNT PATIENT-IMP: NORMAL
BUN SERPL-MCNC: 13 MG/DL (ref 7–30)
BUN SERPL-MCNC: 15 MG/DL (ref 7–30)
CALCIUM SERPL-MCNC: 8.2 MG/DL (ref 8.5–10.1)
CALCIUM SERPL-MCNC: 9.1 MG/DL (ref 8.5–10.1)
CHLORIDE SERPL-SCNC: 107 MMOL/L (ref 94–109)
CHLORIDE SERPL-SCNC: 108 MMOL/L (ref 94–109)
CO2 SERPL-SCNC: 21 MMOL/L (ref 20–32)
CO2 SERPL-SCNC: 21 MMOL/L (ref 20–32)
CREAT SERPL-MCNC: 0.8 MG/DL (ref 0.52–1.04)
CREAT SERPL-MCNC: 0.82 MG/DL (ref 0.52–1.04)
CREAT UR-MCNC: 54 MG/DL
ERYTHROCYTE [DISTWIDTH] IN BLOOD BY AUTOMATED COUNT: 12.7 % (ref 10–15)
ERYTHROCYTE [DISTWIDTH] IN BLOOD BY AUTOMATED COUNT: 12.8 % (ref 10–15)
GFR SERPL CREATININE-BSD FRML MDRD: >90 ML/MIN/{1.73_M2}
GFR SERPL CREATININE-BSD FRML MDRD: >90 ML/MIN/{1.73_M2}
GLUCOSE SERPL-MCNC: 119 MG/DL (ref 70–99)
GLUCOSE SERPL-MCNC: 72 MG/DL (ref 70–99)
HCT VFR BLD AUTO: 33.4 % (ref 35–47)
HCT VFR BLD AUTO: 36.4 % (ref 35–47)
HGB BLD-MCNC: 11.7 G/DL (ref 11.7–15.7)
HGB BLD-MCNC: 12.7 G/DL (ref 11.7–15.7)
MCH RBC QN AUTO: 32.5 PG (ref 26.5–33)
MCH RBC QN AUTO: 33.1 PG (ref 26.5–33)
MCHC RBC AUTO-ENTMCNC: 34.9 G/DL (ref 31.5–36.5)
MCHC RBC AUTO-ENTMCNC: 35 G/DL (ref 31.5–36.5)
MCV RBC AUTO: 93 FL (ref 78–100)
MCV RBC AUTO: 94 FL (ref 78–100)
PLATELET # BLD AUTO: 165 10E9/L (ref 150–450)
PLATELET # BLD AUTO: 181 10E9/L (ref 150–450)
POTASSIUM SERPL-SCNC: 4 MMOL/L (ref 3.4–5.3)
POTASSIUM SERPL-SCNC: 4.3 MMOL/L (ref 3.4–5.3)
PROT UR-MCNC: 0.23 G/L
PROT/CREAT 24H UR: 0.43 G/G CR (ref 0–0.2)
RBC # BLD AUTO: 3.54 10E12/L (ref 3.8–5.2)
RBC # BLD AUTO: 3.91 10E12/L (ref 3.8–5.2)
SARS-COV-2 PCR COMMENT: NORMAL
SARS-COV-2 RNA SPEC QL NAA+PROBE: NEGATIVE
SARS-COV-2 RNA SPEC QL NAA+PROBE: NORMAL
SODIUM SERPL-SCNC: 136 MMOL/L (ref 133–144)
SODIUM SERPL-SCNC: 136 MMOL/L (ref 133–144)
SPECIMEN EXP DATE BLD: NORMAL
SPECIMEN SOURCE: NORMAL
SPECIMEN SOURCE: NORMAL
URATE SERPL-MCNC: 5.3 MG/DL (ref 2.6–6)
URATE SERPL-MCNC: 5.6 MG/DL (ref 2.6–6)
WBC # BLD AUTO: 10.2 10E9/L (ref 4–11)
WBC # BLD AUTO: 9.2 10E9/L (ref 4–11)

## 2020-07-10 PROCEDURE — 25000128 H RX IP 250 OP 636: Performed by: OBSTETRICS & GYNECOLOGY

## 2020-07-10 PROCEDURE — 85027 COMPLETE CBC AUTOMATED: CPT | Performed by: ADVANCED PRACTICE MIDWIFE

## 2020-07-10 PROCEDURE — 25000125 ZZHC RX 250: Performed by: OBSTETRICS & GYNECOLOGY

## 2020-07-10 PROCEDURE — 84460 ALANINE AMINO (ALT) (SGPT): CPT | Performed by: ADVANCED PRACTICE MIDWIFE

## 2020-07-10 PROCEDURE — 86850 RBC ANTIBODY SCREEN: CPT | Performed by: ADVANCED PRACTICE MIDWIFE

## 2020-07-10 PROCEDURE — 84450 TRANSFERASE (AST) (SGOT): CPT | Performed by: ADVANCED PRACTICE MIDWIFE

## 2020-07-10 PROCEDURE — C9803 HOPD COVID-19 SPEC COLLECT: HCPCS

## 2020-07-10 PROCEDURE — 84156 ASSAY OF PROTEIN URINE: CPT | Performed by: ADVANCED PRACTICE MIDWIFE

## 2020-07-10 PROCEDURE — G0463 HOSPITAL OUTPT CLINIC VISIT: HCPCS | Mod: ZF

## 2020-07-10 PROCEDURE — 25800030 ZZH RX IP 258 OP 636: Performed by: ADVANCED PRACTICE MIDWIFE

## 2020-07-10 PROCEDURE — 59025 FETAL NON-STRESS TEST: CPT

## 2020-07-10 PROCEDURE — G0463 HOSPITAL OUTPT CLINIC VISIT: HCPCS | Mod: 25

## 2020-07-10 PROCEDURE — U0003 INFECTIOUS AGENT DETECTION BY NUCLEIC ACID (DNA OR RNA); SEVERE ACUTE RESPIRATORY SYNDROME CORONAVIRUS 2 (SARS-COV-2) (CORONAVIRUS DISEASE [COVID-19]), AMPLIFIED PROBE TECHNIQUE, MAKING USE OF HIGH THROUGHPUT TECHNOLOGIES AS DESCRIBED BY CMS-2020-01-R: HCPCS | Performed by: ADVANCED PRACTICE MIDWIFE

## 2020-07-10 PROCEDURE — 86901 BLOOD TYPING SEROLOGIC RH(D): CPT | Performed by: ADVANCED PRACTICE MIDWIFE

## 2020-07-10 PROCEDURE — 25000128 H RX IP 250 OP 636: Performed by: ADVANCED PRACTICE MIDWIFE

## 2020-07-10 PROCEDURE — 80048 BASIC METABOLIC PNL TOTAL CA: CPT | Performed by: ADVANCED PRACTICE MIDWIFE

## 2020-07-10 PROCEDURE — 36415 COLL VENOUS BLD VENIPUNCTURE: CPT | Performed by: ADVANCED PRACTICE MIDWIFE

## 2020-07-10 PROCEDURE — 86900 BLOOD TYPING SEROLOGIC ABO: CPT | Performed by: ADVANCED PRACTICE MIDWIFE

## 2020-07-10 PROCEDURE — 84550 ASSAY OF BLOOD/URIC ACID: CPT | Performed by: ADVANCED PRACTICE MIDWIFE

## 2020-07-10 PROCEDURE — 12000001 ZZH R&B MED SURG/OB UMMC

## 2020-07-10 PROCEDURE — 25000132 ZZH RX MED GY IP 250 OP 250 PS 637: Performed by: ADVANCED PRACTICE MIDWIFE

## 2020-07-10 PROCEDURE — 25800030 ZZH RX IP 258 OP 636: Performed by: OBSTETRICS & GYNECOLOGY

## 2020-07-10 RX ORDER — LIDOCAINE 40 MG/G
CREAM TOPICAL
Status: DISCONTINUED | OUTPATIENT
Start: 2020-07-10 | End: 2020-07-15 | Stop reason: HOSPADM

## 2020-07-10 RX ORDER — LABETALOL 20 MG/4 ML (5 MG/ML) INTRAVENOUS SYRINGE
80 EVERY 10 MIN PRN
Status: DISCONTINUED | OUTPATIENT
Start: 2020-07-10 | End: 2020-07-15 | Stop reason: HOSPADM

## 2020-07-10 RX ORDER — OXYTOCIN/0.9 % SODIUM CHLORIDE 30/500 ML
100-340 PLASTIC BAG, INJECTION (ML) INTRAVENOUS CONTINUOUS PRN
Status: COMPLETED | OUTPATIENT
Start: 2020-07-10 | End: 2020-07-11

## 2020-07-10 RX ORDER — IBUPROFEN 800 MG/1
800 TABLET, FILM COATED ORAL
Status: DISCONTINUED | OUTPATIENT
Start: 2020-07-10 | End: 2020-07-11

## 2020-07-10 RX ORDER — MAGNESIUM SULFATE HEPTAHYDRATE 500 MG/ML
4 INJECTION, SOLUTION INTRAMUSCULAR; INTRAVENOUS
Status: DISCONTINUED | OUTPATIENT
Start: 2020-07-10 | End: 2020-07-15 | Stop reason: HOSPADM

## 2020-07-10 RX ORDER — LABETALOL 20 MG/4 ML (5 MG/ML) INTRAVENOUS SYRINGE
40 EVERY 10 MIN PRN
Status: DISCONTINUED | OUTPATIENT
Start: 2020-07-10 | End: 2020-07-10

## 2020-07-10 RX ORDER — MAGNESIUM SULFATE IN WATER 40 MG/ML
2 INJECTION, SOLUTION INTRAVENOUS CONTINUOUS
Status: DISPENSED | OUTPATIENT
Start: 2020-07-10 | End: 2020-07-12

## 2020-07-10 RX ORDER — MAGNESIUM SULFATE HEPTAHYDRATE 40 MG/ML
4 INJECTION, SOLUTION INTRAVENOUS ONCE
Status: COMPLETED | OUTPATIENT
Start: 2020-07-10 | End: 2020-07-10

## 2020-07-10 RX ORDER — METHYLERGONOVINE MALEATE 0.2 MG/ML
200 INJECTION INTRAVENOUS
Status: DISCONTINUED | OUTPATIENT
Start: 2020-07-10 | End: 2020-07-10

## 2020-07-10 RX ORDER — PENICILLIN G POTASSIUM 5000000 [IU]/1
5 INJECTION, POWDER, FOR SOLUTION INTRAMUSCULAR; INTRAVENOUS ONCE
Status: COMPLETED | OUTPATIENT
Start: 2020-07-10 | End: 2020-07-10

## 2020-07-10 RX ORDER — CARBOPROST TROMETHAMINE 250 UG/ML
250 INJECTION, SOLUTION INTRAMUSCULAR
Status: DISCONTINUED | OUTPATIENT
Start: 2020-07-10 | End: 2020-07-11

## 2020-07-10 RX ORDER — CALCIUM GLUCONATE 94 MG/ML
1 INJECTION, SOLUTION INTRAVENOUS
Status: DISCONTINUED | OUTPATIENT
Start: 2020-07-10 | End: 2020-07-15 | Stop reason: HOSPADM

## 2020-07-10 RX ORDER — OXYCODONE AND ACETAMINOPHEN 5; 325 MG/1; MG/1
1 TABLET ORAL
Status: DISCONTINUED | OUTPATIENT
Start: 2020-07-10 | End: 2020-07-11

## 2020-07-10 RX ORDER — OXYTOCIN/0.9 % SODIUM CHLORIDE 30/500 ML
1-24 PLASTIC BAG, INJECTION (ML) INTRAVENOUS CONTINUOUS
Status: DISCONTINUED | OUTPATIENT
Start: 2020-07-10 | End: 2020-07-11

## 2020-07-10 RX ORDER — LABETALOL 20 MG/4 ML (5 MG/ML) INTRAVENOUS SYRINGE
40 EVERY 10 MIN PRN
Status: DISCONTINUED | OUTPATIENT
Start: 2020-07-10 | End: 2020-07-15 | Stop reason: HOSPADM

## 2020-07-10 RX ORDER — VALACYCLOVIR HYDROCHLORIDE 500 MG/1
500 TABLET, FILM COATED ORAL EVERY 12 HOURS SCHEDULED
Status: DISCONTINUED | OUTPATIENT
Start: 2020-07-10 | End: 2020-07-15 | Stop reason: HOSPADM

## 2020-07-10 RX ORDER — MAGNESIUM SULFATE HEPTAHYDRATE 40 MG/ML
2 INJECTION, SOLUTION INTRAVENOUS
Status: DISCONTINUED | OUTPATIENT
Start: 2020-07-10 | End: 2020-07-10

## 2020-07-10 RX ORDER — FENTANYL CITRATE 50 UG/ML
50-100 INJECTION, SOLUTION INTRAMUSCULAR; INTRAVENOUS
Status: DISCONTINUED | OUTPATIENT
Start: 2020-07-10 | End: 2020-07-11

## 2020-07-10 RX ORDER — NALOXONE HYDROCHLORIDE 0.4 MG/ML
.1-.4 INJECTION, SOLUTION INTRAMUSCULAR; INTRAVENOUS; SUBCUTANEOUS
Status: DISCONTINUED | OUTPATIENT
Start: 2020-07-10 | End: 2020-07-11

## 2020-07-10 RX ORDER — LORAZEPAM 2 MG/ML
2 INJECTION INTRAMUSCULAR
Status: DISCONTINUED | OUTPATIENT
Start: 2020-07-10 | End: 2020-07-15 | Stop reason: HOSPADM

## 2020-07-10 RX ORDER — LORAZEPAM 2 MG/ML
2 INJECTION INTRAMUSCULAR
Status: DISCONTINUED | OUTPATIENT
Start: 2020-07-10 | End: 2020-07-10

## 2020-07-10 RX ORDER — MAGNESIUM SULFATE HEPTAHYDRATE 40 MG/ML
4 INJECTION, SOLUTION INTRAVENOUS
Status: DISCONTINUED | OUTPATIENT
Start: 2020-07-10 | End: 2020-07-15 | Stop reason: HOSPADM

## 2020-07-10 RX ORDER — MAGNESIUM SULFATE HEPTAHYDRATE 40 MG/ML
4 INJECTION, SOLUTION INTRAVENOUS
Status: DISCONTINUED | OUTPATIENT
Start: 2020-07-10 | End: 2020-07-10

## 2020-07-10 RX ORDER — NIFEDIPINE 10 MG/1
10 CAPSULE ORAL
Status: DISCONTINUED | OUTPATIENT
Start: 2020-07-10 | End: 2020-07-10

## 2020-07-10 RX ORDER — ACETAMINOPHEN 325 MG/1
650 TABLET ORAL EVERY 4 HOURS PRN
Status: DISCONTINUED | OUTPATIENT
Start: 2020-07-10 | End: 2020-07-11

## 2020-07-10 RX ORDER — ONDANSETRON 2 MG/ML
4 INJECTION INTRAMUSCULAR; INTRAVENOUS EVERY 6 HOURS PRN
Status: DISCONTINUED | OUTPATIENT
Start: 2020-07-10 | End: 2020-07-11

## 2020-07-10 RX ORDER — MAGNESIUM SULFATE HEPTAHYDRATE 500 MG/ML
4 INJECTION, SOLUTION INTRAMUSCULAR; INTRAVENOUS
Status: DISCONTINUED | OUTPATIENT
Start: 2020-07-10 | End: 2020-07-14

## 2020-07-10 RX ORDER — LABETALOL 20 MG/4 ML (5 MG/ML) INTRAVENOUS SYRINGE
20 EVERY 10 MIN PRN
Status: DISCONTINUED | OUTPATIENT
Start: 2020-07-10 | End: 2020-07-15 | Stop reason: HOSPADM

## 2020-07-10 RX ORDER — SODIUM CHLORIDE, SODIUM LACTATE, POTASSIUM CHLORIDE, CALCIUM CHLORIDE 600; 310; 30; 20 MG/100ML; MG/100ML; MG/100ML; MG/100ML
10-125 INJECTION, SOLUTION INTRAVENOUS CONTINUOUS
Status: DISCONTINUED | OUTPATIENT
Start: 2020-07-10 | End: 2020-07-15 | Stop reason: HOSPADM

## 2020-07-10 RX ORDER — LABETALOL 20 MG/4 ML (5 MG/ML) INTRAVENOUS SYRINGE
80 EVERY 10 MIN PRN
Status: DISCONTINUED | OUTPATIENT
Start: 2020-07-10 | End: 2020-07-10

## 2020-07-10 RX ORDER — OXYTOCIN 10 [USP'U]/ML
10 INJECTION, SOLUTION INTRAMUSCULAR; INTRAVENOUS
Status: DISCONTINUED | OUTPATIENT
Start: 2020-07-10 | End: 2020-07-11

## 2020-07-10 RX ORDER — NIFEDIPINE 10 MG/1
10 CAPSULE ORAL
Status: DISCONTINUED | OUTPATIENT
Start: 2020-07-10 | End: 2020-07-15 | Stop reason: HOSPADM

## 2020-07-10 RX ORDER — CITRIC ACID/SODIUM CITRATE 334-500MG
30 SOLUTION, ORAL ORAL ONCE
Status: DISCONTINUED | OUTPATIENT
Start: 2020-07-10 | End: 2020-07-11

## 2020-07-10 RX ORDER — LABETALOL 20 MG/4 ML (5 MG/ML) INTRAVENOUS SYRINGE
20 EVERY 10 MIN PRN
Status: DISCONTINUED | OUTPATIENT
Start: 2020-07-10 | End: 2020-07-10

## 2020-07-10 RX ORDER — MAGNESIUM SULFATE HEPTAHYDRATE 40 MG/ML
2 INJECTION, SOLUTION INTRAVENOUS
Status: DISCONTINUED | OUTPATIENT
Start: 2020-07-10 | End: 2020-07-15 | Stop reason: HOSPADM

## 2020-07-10 RX ADMIN — LABETALOL 20 MG/4 ML (5 MG/ML) INTRAVENOUS SYRINGE 40 MG: at 18:55

## 2020-07-10 RX ADMIN — SODIUM CHLORIDE, POTASSIUM CHLORIDE, SODIUM LACTATE AND CALCIUM CHLORIDE 25 ML/HR: 600; 310; 30; 20 INJECTION, SOLUTION INTRAVENOUS at 19:46

## 2020-07-10 RX ADMIN — MAGNESIUM SULFATE IN WATER 2 G/HR: 40 INJECTION, SOLUTION INTRAVENOUS at 20:35

## 2020-07-10 RX ADMIN — LABETALOL 20 MG/4 ML (5 MG/ML) INTRAVENOUS SYRINGE 20 MG: at 18:39

## 2020-07-10 RX ADMIN — Medication 2.5 MILLION UNITS: at 22:08

## 2020-07-10 RX ADMIN — PENICILLIN G POTASSIUM 5 MILLION UNITS: 5000000 POWDER, FOR SOLUTION INTRAMUSCULAR; INTRAPLEURAL; INTRATHECAL; INTRAVENOUS at 13:56

## 2020-07-10 RX ADMIN — Medication 2 MILLI-UNITS/MIN: at 20:54

## 2020-07-10 RX ADMIN — VALACYCLOVIR HYDROCHLORIDE 500 MG: 500 TABLET, FILM COATED ORAL at 18:03

## 2020-07-10 RX ADMIN — Medication 2.5 MILLION UNITS: at 18:03

## 2020-07-10 RX ADMIN — MAGNESIUM SULFATE IN WATER 4 G: 40 INJECTION, SOLUTION INTRAVENOUS at 19:43

## 2020-07-10 ASSESSMENT — MIFFLIN-ST. JEOR
SCORE: 1671.18
SCORE: 1667.55

## 2020-07-10 NOTE — PROGRESS NOTES
"Subjective:     29 year old  at 40w0d presents for routine prenatal visit.            no vaginal bleeding or leakage of fluid.  no contractions.  pos fetal movement.        No HA, visual changes, RUQ or epigastric pain.   Patient concerns:   Feeling well overall physically, very very anxious about worries around baby and placenta function.  Objective:  Vitals:    07/10/20 1111   BP: (!) 144/93   BP Location: Left arm   Patient Position: Chair   Pulse: 74   Weight: 86.1 kg (189 lb 12.8 oz)   Height: 1.778 m (5' 10\")    See OB flowsheet  Assessment/Plan     Encounter Diagnoses   Name Primary?     Supervision of other normal pregnancy, [facility] CNM Yes     HSV (herpes simplex virus) infection      Anxiety      GBS (group B Streptococcus carrier), +RV culture, currently pregnant      No orders of the defined types were placed in this encounter.    No orders of the defined types were placed in this encounter.      Discussed HTN and need for transport to labor unit for PreE/GHTN eval, assume IOL today  Ni Boyle, APRN CNM      Answers for HPI/ROS submitted by the patient on 2020   MYNOR 7 TOTAL SCORE: 12    "

## 2020-07-10 NOTE — PROGRESS NOTES
"Labor progress note    S: Pt noted sitting up on birthing ball, coping well with contractions but states getting more intense.  Tolerated regular diet. No HA, visual changes, RUQ or epigastric pain.  Pt's partner continues to be supportive.     O:  Blood pressure (!) 139/97, temperature 98.2  F (36.8  C), temperature source Oral, resp. rate 18, height 1.778 m (5' 10\"), weight 85.7 kg (189 lb), last menstrual period 10/04/2019, not currently breastfeeding.  General appearance: uncomfortable with contractions but coping well.  Contractions: Every 2-6 minutes. 60-90 seconds duration.  Palpate: moderate.  Soft resting tone.   FHT: Baseline 130 with moderate variability. Accelerations are present. no decelerations present.  ROM: not ruptured.  PELVIC EXAM:deferred  [place] Score:Total: 1013 on admit    Pitocin- none,  Antibiotics- PCN  IV saline lock    Noted COVID negative.         # Pain Assessment:    - Rach is experiencing pain due to labor. Pain management was discussed with Rach and her significant other and the plan was created in a collaborative fashion.  Rach's response to the current recommendations: engaged  - Coping well, declines additional supports or pharmacologic intervention.         A:  29 year old  with IUP @ 40w0d Pre Eclampsia without severe features  Early labor  Fetal Heart Rate Tracing category one  GBS- positive- antibiotics started  Anxiety  History of fetal loss  History of HSV, no prodromal s/s, on suppressive dosing    Patient Active Problem List   Diagnosis     HSV (herpes simplex virus) infection     Supervision of other normal pregnancy, [facility] CNM     Anxiety     GBS (group B Streptococcus carrier), +RV culture, currently pregnant     Elevated BP without diagnosis of hypertension     History of fetal anomaly in prior pregnancy, currently pregnant         P:  -Ambulation, hydration, position changes, birthing ball/sling and tub options to facilitate labor.    -Pharmacologic " pain management options of Nitrous Oxide, Fentanyl IV and epidural reviewed with pt. Pt is interested in continued positioning/non pharmacologic supports.   -Plan reassess SVE ~4 hours after initial exam and consider labor augmentation with Pitocin and AROM prn  -Continue prophylactic IV antibiotic PCN for positive GBS status.   - Continue close surveillance of blood pressure and Pre E s/s.  Plan repeat Pre E labs ~2000 and prn.  Consider MD consult prn if worsening hypertensive disorder and c/section prn if remote from delivery.   -Reevaluate in 2 hours and prn  Denise MUJICA, CNM

## 2020-07-10 NOTE — PROGRESS NOTES
"Labor progress note    S: Pt has been up on birthing ball, coping with contractions with movement and partner support.   Is about to eat dinner and then will agree to E for plan of care discussion.   No HA, visual changes, RUQ or epigastric pain.      O:  Blood pressure (!) 139/97, temperature 98.2  F (36.8  C), temperature source Oral, resp. rate 18, height 1.778 m (5' 10\"), weight 85.7 kg (189 lb), last menstrual period 10/04/2019, not currently breastfeeding.  General appearance: uncomfortable with some contractions, coping well  Contractions: Every 4-7 minutes.  seconds duration.  Palpate: moderate.  Soft resting tone.   FHT: Baseline 140 with moderate variability. Accelerations are present. no decelerations present.  ROM: not ruptured. .  PELVIC EXAM:deferred  [place] Score:Total: 10/13 on admit    Pitocin- none,  Antibiotics- PCN  IV saline lock        # Pain Assessment:    - Rach is experiencing pain due to early labor. Pain management was discussed with Rach and her significant other and the plan was created in a collaborative fashion.  Rach's response to the current recommendations: engaged  - Coping well at present        A:  29 year old  with IUP @ 40w0d Pre Eclampsia without severe features   Early labor  Fetal Heart Rate Tracing category one  GBS- positive- antibiotics started  History of fetal loss  Anxiety  History of HSV, no prodromal s/s, on suppressive dosing, negative BLE on admit    Patient Active Problem List   Diagnosis     HSV (herpes simplex virus) infection     Supervision of other normal pregnancy, [facility] CN     Anxiety     GBS (group B Streptococcus carrier), +RV culture, currently pregnant     Elevated BP without diagnosis of hypertension     History of fetal anomaly in prior pregnancy, currently pregnant         P:  -Ambulation, hydration, position changes, birthing ball/sling and tub options to facilitate labor.  Again reviewed if severe range elevated BP or change " in maternal/fetal status may suggest more bedrest.   -Pain medication options of Nitrous Oxide, Fentanyl IV and epidural reviewed with pt. Pt is interested in continued non pharmacologic options at present.  May consider epidural prn   -Consider labor augmentation with Pitocin or AROM prn  -Continue prophylactic IV antibiotic PCN for positive GBS status.   - Continue close surveillance of blood pressure and worsening s/s of Pre E. Repeat Pre E labs scheduled for 2000 and prn.  Plan MD consult prn if worsening hypertensive disorder and consider c/section prn if remote from delivery.   - Continue BID Valtrex dosing and pt aware will update team prn if prodromal s/s  -Reevaluate in 1 hours for SVE for plan of care and prn    Denise MUJICA, ANTWON      ADDENDUM 7/10/2020 6:21 PM - paged by bedside RN that pt had severe range BP again and again but not before 15 minutes.  At 15 minutes BP was noted to be mild range.  Will report off to on coming CNM who will consult with MD.  Pt is aware if sustained severe range BP requiring treatment or symptoms will be candidate for Magnesium Sulfate and transfer to MD team.  GUANAKO Andrews CNNAEEM    ADDENDUM 7/10/2020 6:38 PM - paged by bedside RN that pt had another severe range BP.  Reviewed plan to treat with Labetalol and Magnesium Sulfate therapy.  On coming CNM will update MD team and transfer. GUANAKO Andrews CNM

## 2020-07-10 NOTE — LETTER
"7/10/2020       RE: Rach Peterson   [address] [place] [address]     Dear Colleague,    Thank you for referring your patient, Rach Peterson, to the [facility] at [facility]. Please see a copy of my visit note below.    Subjective:     29 year old  at 40w0d presents for routine prenatal visit.            no vaginal bleeding or leakage of fluid.  no contractions.  pos fetal movement.        No HA, visual changes, RUQ or epigastric pain.   Patient concerns:   Feeling well overall physically, very very anxious about worries around baby and placenta function.  Objective:  Vitals:    07/10/20 1111   BP: (!) 144/93   BP Location: Left arm   Patient Position: Chair   Pulse: 74   Weight: 86.1 kg (189 lb 12.8 oz)   Height: 1.778 m (5' 10\")    See OB flowsheet  Assessment/Plan     Encounter Diagnoses   Name Primary?     Supervision of other normal pregnancy, [facility] CNM Yes     HSV (herpes simplex virus) infection      Anxiety      GBS (group B Streptococcus carrier), +RV culture, currently pregnant      No orders of the defined types were placed in this encounter.    No orders of the defined types were placed in this encounter.      Discussed HTN and need for transport to labor unit for PreE/GHTN eval, assume IOL today  GUANAKO Ocampo CNM      Answers for HPI/ROS submitted by the patient on 2020   MYNOR 7 TOTAL SCORE: 12        "

## 2020-07-10 NOTE — PLAN OF CARE
Data: Patient presented to [facility] at 1153.   Reason for maternal/fetal assessment per patient is Rule Out Pre-eclampsia  .  Patient is a . Prenatal record reviewed.      OB History    Para Term  AB Living   3 0 0 0 2 0   SAB TAB Ectopic Multiple Live Births   1 1 0 0 0      # Outcome Date GA Lbr T[place]anda/2nd Weight Sex Delivery Anes PTL Lv   3 Current            2 SAB 10/04/19           1 TAB 19 15w6d       FD      Birth Comments: 15 week D&E for fetal omphalocele   . Medical history:   Past Medical History:   Diagnosis Date     Anxiety     as adult, therapy, no meds or hosp     Herpes simplex without mention of complication 2011     Knee pain, right      Syncope and collapse     Cardiology workup with Dr Brady, wandering atrial pacer, no activity restritction and no need for abx prophylaxis (mild pulm flow murmur)   . Gestational Age 40w0d. VSS. Fetal movement present. Patient denies cramping, backache, vaginal discharge, pelvic pressure, UTI symptoms, GI problems, bloody show, vaginal bleeding, edema, headache, visual disturbances, epigastric or URQ pain, abdominal pain, rupture of membranes. States that she is feeling well overall.  Sent from clinic for elevated blood pressures.  Action: Verbal consent for EFM. Triage assessment completed. EFM applied. Uterine assessment per toco. Fetal assessment: Presumed adequate fetal oxygenation documented (see flow record).   Response: FANNY Estes CNM informed of patient arrival and intermittent severe range BPs. Plan per provider is admit for IOL. Patient verbalized agreement with plan. Patient transferred to room 444 ambulatory, oriented to room and call light.

## 2020-07-10 NOTE — PLAN OF CARE
Pt settled in 444, BPs now in mildly elevated range.  Denies headache, vision change, SOB, epigastric pain.  DTRs +2 bilateral, +1 beat clonus bilateral.  FANNY OAKESM to bedside for SVE at 1320: 4-5/80/0.  Plan expectant management at this time.  Pt gregory every 1-4 minutes, not painfully.  Penicillin started for GBS positive.

## 2020-07-10 NOTE — NURSING NOTE
Chief Complaint   Patient presents with     Prenatal Care     40w0d, would like to discuss induction       See LIBRADO Juarez 7/10/2020

## 2020-07-10 NOTE — H&P
ADMIT NOTE  =================  40w0d    Rach Peterson is a 29 year old female with an Patient's last menstrual period was 10/04/2019. and Estimated Date of Delivery: Jul 10, 2020 is [facility] on 7/10/2020 at 12:41 PM for induction of labor.  Indication: Gestational Hypertension vs Pre Eclampsia.     HPI  ================  Pt sent over from [facility] clinic for Severe range SBP with follow up mild range blood pressure at term.  Has no s/s of Pre E  - no HA, visual changes, RUQ or epigastric pain, no increase in swelling.  Has baseline anxiety that has worsened throughout her pregnancy.  She has a personal history of  D&C at 15 weeks for giant omphalocele and SAB after that last year.  Is just hopeful to keep baby safe. Has been working closely with therapist to cope with anxiety.  Her partner Tim is here and very supportive.     - Aware of GBS positive - plan PCN if labor or ROM  - H/o HSV - has been taking Valtrex daily suppression, started twice daily dosing at 37 weeks. No prodromal s/s. Is agreeable to  bright light exam with SVE.   - Desires bottlefeeding NOT breastfeeding  - Denies fever, cough, SOB or chest pain. Denies having contact with anyone who is Covid-19 positive. Agreeable to Covid-19 testing    Contractions- not felt by patient  Fetal movement- active  ROM- no.  Vaginal bleeding- none  GBS- positive  FOB- is involved, Present and supporitive  Other labor support- None planned at present.     Weight gain- 189 - 152 lbs, Total weight gain- 37 lbs  Height- 70in  BMI- 22  First prenatal visit at 8 weeks, Total visits- 13    PROBLEM LIST  =================  Patient Active Problem List    Diagnosis Date Noted     Elevated BP without diagnosis of hypertension 07/10/2020     Priority: Medium     History of fetal anomaly in prior pregnancy, currently pregnant 07/10/2020     Priority: Medium     7/10/2020 - Omphalocele in previous pregnancy - child not living       GBS (group B  Streptococcus carrier), +RV culture, currently pregnant 06/15/2020     Priority: Medium     6/12/20- pos GBS, treat in labor. Susceptible to all       Anxiety      Priority: Medium     History as adult, therapy, no meds or hosp  12/26: current therapist, reg visits. Struggling with recent loss and fears with this preg       Supervision of other normal pregnancy, ERIKA KAPOOR 12/10/2019     Priority: Medium     1st trimester screening 1/3/2020  [facility] pt  Partner's name:   [X ] Entered on Epic list  [ ] NOB folder  [ ] First tri screen  ordered; declined  [ ] QS/AFP ordered declined  [X ] Fetal anatomy US , nml, repeat at 22 amd 32 wks for reassurance given last preg  [ X ] Rubella immune  [X ] plan utox in labor: NO  [ ] EOB folder  [ ] FLU shot  [ X ] GCT, passed  [ ] Waterbirth declines,consent done  [X ] TDAP given  [x ] GBS pos  [X] OTC PP meds sent  [X ] PP Contraception plan: condoms  [X] Labor plans: hopes for no epidural, open  [ X] : NO plans  [X] Infant feeding plan: Bottlefeeding, d/t mental health concerns             HSV (herpes simplex virus) infection 05/25/2018     Priority: Medium     Takes daily suppression         HISTORIES  ============  Allergies   Allergen Reactions     No Known Drug Allergies      Past Medical History:   Diagnosis Date     Anxiety     as adult, therapy, no meds or hosp     Herpes simplex without mention of complication 12/1/2011     Knee pain, right      Syncope and collapse 2006    Cardiology workup with Dr Brady, wandering atrial pacer, no activity restritction and no need for abx prophylaxis (mild pulm flow murmur)     Past Surgical History:   Procedure Laterality Date     DILATION AND EVACUATION N/A 7/3/2019    Procedure: Dilation And Evacuation;  Surgeon: Josefa Kwan MD;  Location: [facility] OR     HC REMOVE TONSILS/ADENOIDS,<11 Y/O      T & A <13y.o.   .  Family History   Problem Relation Age of Onset     Hypertension Mother      Cancer Maternal Aunt          Melanoma     Social History     Tobacco Use     Smoking status: Never Smoker     Smokeless tobacco: Never Used     Tobacco comment: Non smoking home   Substance Use Topics     Alcohol use: Not Currently     Comment: four drinks once every two weeks.     OB History    Para Term  AB Living   3 0 0 0 2 0   SAB TAB Ectopic Multiple Live Births   1 1 0 0 0      # Outcome Date GA Lbr Tawanda/2nd Weight Sex Delivery Anes PTL Lv   3 Current            2 SAB 10/04/19           1 TAB 19 15w6d       FD      Birth Comments: 15 week D&E for fetal omphalocele        LABS:   ===========  Prenatal Labs:  Rhogam not indicated   Lab Results   Component Value Date    ABO A 07/10/2020    RH Pos 07/10/2020    AS Neg 07/10/2020    RUQIGG 15 2019    HEPBANG Nonreactive 2019    HGB 12.7 07/10/2020    HIAGAB Nonreactive 2019    GLU1 108 2020     Rubella IMMUNE  Lab Results   Component Value Date    GBS Positive 2020     Other labs:  COVID-19 PCR Results    COVID-19 PCR Results   No data to display.         COVID-19 Antibody Results, Testing for Immunity    COVID-19 Antibody Results, Testing for Immunity   No data to display.            Results for orders placed or performed during the hospital encounter of 07/10/20 (from the past 24 hour(s))   Protein  random urine with Creat Ratio   Result Value Ref Range    Protein Random Urine 0.23 g/L    Protein Total Urine g/gr Creatinine 0.43 (H) 0 - 0.2 g/g Cr   Creatinine urine calculation only   Result Value Ref Range    Creatinine Urine 54 mg/dL   CBC with platelets   Result Value Ref Range    WBC 9.2 4.0 - 11.0 10e9/L    RBC Count 3.91 3.8 - 5.2 10e12/L    Hemoglobin 12.7 11.7 - 15.7 g/dL    Hematocrit 36.4 35.0 - 47.0 %    MCV 93 78 - 100 fl    MCH 32.5 26.5 - 33.0 pg    MCHC 34.9 31.5 - 36.5 g/dL    RDW 12.7 10.0 - 15.0 %    Platelet Count 181 150 - 450 10e9/L   AST   Result Value Ref Range    AST 62 (H) 0 - 45 U/L   ALT   Result Value Ref Range     "ALT 91 (H) 0 - 50 U/L   Basic metabolic panel   Result Value Ref Range    Sodium 136 133 - 144 mmol/L    Potassium 4.3 3.4 - 5.3 mmol/L    Chloride 108 94 - 109 mmol/L    Carbon Dioxide 21 20 - 32 mmol/L    Anion Gap 7 3 - 14 mmol/L    Glucose 72 70 - 99 mg/dL    Urea Nitrogen 13 7 - 30 mg/dL    Creatinine 0.82 0.52 - 1.04 mg/dL    GFR Estimate >90 >60 mL/min/[1.73_m2]    GFR Estimate If Black >90 >60 mL/min/[1.73_m2]    Calcium 9.1 8.5 - 10.1 mg/dL   Uric acid   Result Value Ref Range    Uric Acid 5.6 2.6 - 6.0 mg/dL   ABO/Rh type and screen   Result Value Ref Range    ABO A     RH(D) Pos     Antibody Screen Neg     Test Valid Only At          [facility]    Specimen Expires 07/13/2020        ROS  =========  Pt denies significant respiratory, cardiovacular, GI, or muscular/skeletalcomplaints.    See RN data base ROS.       PHYSICAL EXAM:  ===============  BP (!) 165/86   Temp 98.2  F (36.8  C) (Oral)   Resp 18   Ht 1.778 m (5' 10\")   Wt 85.7 kg (189 lb)   LMP 10/04/2019   BMI 27.12 kg/m    General appearance: comfortable  GENERAL APPEARANCE: healthy, alert and no distress  RESP: lungs clear to auscultation - no rales, rhonchi or wheezes  CV: regular rates and rhythm, normal S1 S2, no S3 or S4 and no murmur,and no varicosities  ABDOMEN:  soft, nontender, no epigastric pain  SKIN: no suspicious lesions or rashes  NEURO: Denies headache, blurred vision, other vision changes  PSYCH: mentation appears normal. and affect normal/bright  Legs: Reflexes normal bilaterally, No clonus bilaterally, No edema and Negative Amarilys's      Abdomen: gravid, vertex fetus per Leopold's, non-tender between contractions.   Cephalic presentation confirmed by BSUS in transverse abdominal view  EFW-  [id number] by Leopold's    CONTRACTIONS: every 3-5 minutes x  seconds and moderate to palpation.  Soft resting tone  FETAL HEART TONES: continuous EFM- baseline 130 with moderate variability " and positive accelerations. No decelerations.  PELVIC EXAM: 4.5/ 80%/ Mid/ soft/ 0.   Negative Bright Light Exam, so prodromal s/s.   PAULSON SCORE: 10  BLOODY SHOW: yes noted on glove s/p SVE   ROM:no  FLUID: none  ROMPLUS: not done    # Pain Assessment:  Current Pain Score 7/10/2020   Patient currently in pain? joseluis denton pain level was assessed and she currently denies pain.      Results for orders placed or performed during the hospital encounter of 07/10/20   CBC with platelets     Status: None   Result Value Ref Range    WBC 9.2 4.0 - 11.0 10e9/L    RBC Count 3.91 3.8 - 5.2 10e12/L    Hemoglobin 12.7 11.7 - 15.7 g/dL    Hematocrit 36.4 35.0 - 47.0 %    MCV 93 78 - 100 fl    MCH 32.5 26.5 - 33.0 pg    MCHC 34.9 31.5 - 36.5 g/dL    RDW 12.7 10.0 - 15.0 %    Platelet Count 181 150 - 450 10e9/L   AST     Status: Abnormal   Result Value Ref Range    AST 62 (H) 0 - 45 U/L   ALT     Status: Abnormal   Result Value Ref Range    ALT 91 (H) 0 - 50 U/L   Basic metabolic panel     Status: None   Result Value Ref Range    Sodium 136 133 - 144 mmol/L    Potassium 4.3 3.4 - 5.3 mmol/L    Chloride 108 94 - 109 mmol/L    Carbon Dioxide 21 20 - 32 mmol/L    Anion Gap 7 3 - 14 mmol/L    Glucose 72 70 - 99 mg/dL    Urea Nitrogen 13 7 - 30 mg/dL    Creatinine 0.82 0.52 - 1.04 mg/dL    GFR Estimate >90 >60 mL/min/[1.73_m2]    GFR Estimate If Black >90 >60 mL/min/[1.73_m2]    Calcium 9.1 8.5 - 10.1 mg/dL   Uric acid     Status: None   Result Value Ref Range    Uric Acid 5.6 2.6 - 6.0 mg/dL   Protein  random urine with Creat Ratio     Status: Abnormal   Result Value Ref Range    Protein Random Urine 0.23 g/L    Protein Total Urine g/gr Creatinine 0.43 (H) 0 - 0.2 g/g Cr   Creatinine urine calculation only     Status: None   Result Value Ref Range    Creatinine Urine 54 mg/dL   ABO/Rh type and screen     Status: None   Result Value Ref Range    ABO A     RH(D) Pos     Antibody Screen Neg     Test Valid Only At           [facility]    Specimen Expires 2020          ASSESSMENT:  ==============  29 year old  with IUP @ 40w0d admitted for induction of labor.  Indication: preeclampsia without severe features at term   Noted In early labor   Fetal Heart Tones - category one  GBS- positive  H/o HSV  Anxiety  History of fetal loss    Patient Active Problem List   Diagnosis     HSV (herpes simplex virus) infection     Supervision of other normal pregnancy, [facility] CNM     Anxiety     GBS (group B Streptococcus carrier), +RV culture, currently pregnant     Elevated BP without diagnosis of hypertension     History of fetal anomaly in prior pregnancy, currently pregnant       PLAN:  ===========  - Admit - see IP orders.  -Pain medication options reviewed. Pt is interested in non pharmacologic/mobility but open to epidural prn.  -Reviewed early labor with pt and her partner!  Pt is really not feeling much discomfort but is open to reassess SVE in ~4 hours and if no change will consider labor augmentation with Pitocin or AROM d/t Pre Eclampsia.  -Prophylactic IV antibiotic for positive GBS status reviewed with pt. Agreeable to PCN, will start now.  - Continue close surveillance of blood pressure. Repeat Pre E labs 8 hours and prn. Reviewed with pt and her partner possible Magnesium Sulfate therapy and transfer to MD team if sustained severe range BPs or change in status.  MD consult prn if worsening hypertensive disorder and consider c/section prn if remote from delivery. Pt and her partner agreeable.   -Ambulation, hydration, position changes, birthing ball and tub options to facilitate labor reviewed with pt. Reviewed if BP elevated or any worsening or maternal or fetal status may strongly recommend bedrest.   -Continue BID dosing of Valtrex.  Reassess bright light exam prn.  Will update providers prn if prodromal s/s.   -MD on call Nima consulted and agrees with plan.  - Consider social  work consult prn   -Reassess in 2 hours and prn  Denise Estes, APRN CNM

## 2020-07-11 LAB
ALT SERPL W P-5'-P-CCNC: 112 U/L (ref 0–50)
AST SERPL W P-5'-P-CCNC: 96 U/L (ref 0–45)
CREAT SERPL-MCNC: 0.9 MG/DL (ref 0.52–1.04)
GFR SERPL CREATININE-BSD FRML MDRD: 86 ML/MIN/{1.73_M2}
HGB BLD-MCNC: 10.8 G/DL (ref 11.7–15.7)

## 2020-07-11 PROCEDURE — 25000128 H RX IP 250 OP 636: Performed by: ADVANCED PRACTICE MIDWIFE

## 2020-07-11 PROCEDURE — 72200001 ZZH LABOR CARE VAGINAL DELIVERY SINGLE

## 2020-07-11 PROCEDURE — 25000132 ZZH RX MED GY IP 250 OP 250 PS 637: Performed by: STUDENT IN AN ORGANIZED HEALTH CARE EDUCATION/TRAINING PROGRAM

## 2020-07-11 PROCEDURE — 0HQ9XZZ REPAIR PERINEUM SKIN, EXTERNAL APPROACH: ICD-10-PCS | Performed by: ADVANCED PRACTICE MIDWIFE

## 2020-07-11 PROCEDURE — 25000128 H RX IP 250 OP 636

## 2020-07-11 PROCEDURE — 84460 ALANINE AMINO (ALT) (SGPT): CPT | Performed by: STUDENT IN AN ORGANIZED HEALTH CARE EDUCATION/TRAINING PROGRAM

## 2020-07-11 PROCEDURE — 25800030 ZZH RX IP 258 OP 636: Performed by: OBSTETRICS & GYNECOLOGY

## 2020-07-11 PROCEDURE — 36415 COLL VENOUS BLD VENIPUNCTURE: CPT | Performed by: STUDENT IN AN ORGANIZED HEALTH CARE EDUCATION/TRAINING PROGRAM

## 2020-07-11 PROCEDURE — 25000125 ZZHC RX 250

## 2020-07-11 PROCEDURE — 12000001 ZZH R&B MED SURG/OB UMMC

## 2020-07-11 PROCEDURE — 25800030 ZZH RX IP 258 OP 636: Performed by: STUDENT IN AN ORGANIZED HEALTH CARE EDUCATION/TRAINING PROGRAM

## 2020-07-11 PROCEDURE — 84450 TRANSFERASE (AST) (SGOT): CPT | Performed by: STUDENT IN AN ORGANIZED HEALTH CARE EDUCATION/TRAINING PROGRAM

## 2020-07-11 PROCEDURE — 25000128 H RX IP 250 OP 636: Performed by: STUDENT IN AN ORGANIZED HEALTH CARE EDUCATION/TRAINING PROGRAM

## 2020-07-11 PROCEDURE — 85018 HEMOGLOBIN: CPT | Performed by: STUDENT IN AN ORGANIZED HEALTH CARE EDUCATION/TRAINING PROGRAM

## 2020-07-11 PROCEDURE — 82565 ASSAY OF CREATININE: CPT | Performed by: STUDENT IN AN ORGANIZED HEALTH CARE EDUCATION/TRAINING PROGRAM

## 2020-07-11 PROCEDURE — 25000132 ZZH RX MED GY IP 250 OP 250 PS 637: Performed by: ADVANCED PRACTICE MIDWIFE

## 2020-07-11 PROCEDURE — 25000125 ZZHC RX 250: Performed by: ADVANCED PRACTICE MIDWIFE

## 2020-07-11 RX ORDER — NALOXONE HYDROCHLORIDE 0.4 MG/ML
.1-.4 INJECTION, SOLUTION INTRAMUSCULAR; INTRAVENOUS; SUBCUTANEOUS
Status: DISCONTINUED | OUTPATIENT
Start: 2020-07-11 | End: 2020-07-15 | Stop reason: HOSPADM

## 2020-07-11 RX ORDER — IBUPROFEN 800 MG/1
800 TABLET, FILM COATED ORAL EVERY 6 HOURS PRN
Status: DISCONTINUED | OUTPATIENT
Start: 2020-07-11 | End: 2020-07-15 | Stop reason: HOSPADM

## 2020-07-11 RX ORDER — OXYCODONE HYDROCHLORIDE 5 MG/1
5 TABLET ORAL EVERY 4 HOURS PRN
Status: DISCONTINUED | OUTPATIENT
Start: 2020-07-11 | End: 2020-07-15 | Stop reason: HOSPADM

## 2020-07-11 RX ORDER — LIDOCAINE HYDROCHLORIDE 10 MG/ML
INJECTION, SOLUTION EPIDURAL; INFILTRATION; INTRACAUDAL; PERINEURAL
Status: COMPLETED
Start: 2020-07-11 | End: 2020-07-11

## 2020-07-11 RX ORDER — BISACODYL 10 MG
10 SUPPOSITORY, RECTAL RECTAL DAILY PRN
Status: DISCONTINUED | OUTPATIENT
Start: 2020-07-13 | End: 2020-07-15 | Stop reason: HOSPADM

## 2020-07-11 RX ORDER — OXYTOCIN 10 [USP'U]/ML
INJECTION, SOLUTION INTRAMUSCULAR; INTRAVENOUS
Status: DISCONTINUED
Start: 2020-07-11 | End: 2020-07-11 | Stop reason: WASHOUT

## 2020-07-11 RX ORDER — OXYTOCIN 10 [USP'U]/ML
10 INJECTION, SOLUTION INTRAMUSCULAR; INTRAVENOUS
Status: DISCONTINUED | OUTPATIENT
Start: 2020-07-11 | End: 2020-07-15 | Stop reason: HOSPADM

## 2020-07-11 RX ORDER — OXYTOCIN/0.9 % SODIUM CHLORIDE 30/500 ML
100 PLASTIC BAG, INJECTION (ML) INTRAVENOUS CONTINUOUS
Status: DISCONTINUED | OUTPATIENT
Start: 2020-07-11 | End: 2020-07-15 | Stop reason: HOSPADM

## 2020-07-11 RX ORDER — ACETAMINOPHEN 325 MG/1
650 TABLET ORAL EVERY 4 HOURS PRN
Status: DISCONTINUED | OUTPATIENT
Start: 2020-07-11 | End: 2020-07-15 | Stop reason: HOSPADM

## 2020-07-11 RX ORDER — OXYTOCIN/0.9 % SODIUM CHLORIDE 30/500 ML
340 PLASTIC BAG, INJECTION (ML) INTRAVENOUS CONTINUOUS PRN
Status: DISCONTINUED | OUTPATIENT
Start: 2020-07-11 | End: 2020-07-15 | Stop reason: HOSPADM

## 2020-07-11 RX ORDER — AMOXICILLIN 250 MG
1 CAPSULE ORAL 2 TIMES DAILY
Status: DISCONTINUED | OUTPATIENT
Start: 2020-07-11 | End: 2020-07-15 | Stop reason: HOSPADM

## 2020-07-11 RX ORDER — HYDROCORTISONE 2.5 %
CREAM (GRAM) TOPICAL 3 TIMES DAILY PRN
Status: DISCONTINUED | OUTPATIENT
Start: 2020-07-11 | End: 2020-07-15 | Stop reason: HOSPADM

## 2020-07-11 RX ORDER — AMOXICILLIN 250 MG
2 CAPSULE ORAL 2 TIMES DAILY
Status: DISCONTINUED | OUTPATIENT
Start: 2020-07-11 | End: 2020-07-15 | Stop reason: HOSPADM

## 2020-07-11 RX ORDER — MISOPROSTOL 200 UG/1
TABLET ORAL
Status: DISCONTINUED
Start: 2020-07-11 | End: 2020-07-11 | Stop reason: HOSPADM

## 2020-07-11 RX ORDER — OXYTOCIN/0.9 % SODIUM CHLORIDE 30/500 ML
PLASTIC BAG, INJECTION (ML) INTRAVENOUS
Status: DISCONTINUED
Start: 2020-07-11 | End: 2020-07-11 | Stop reason: HOSPADM

## 2020-07-11 RX ORDER — MODIFIED LANOLIN
OINTMENT (GRAM) TOPICAL
Status: DISCONTINUED | OUTPATIENT
Start: 2020-07-11 | End: 2020-07-15 | Stop reason: HOSPADM

## 2020-07-11 RX ADMIN — ACETAMINOPHEN 650 MG: 325 TABLET, FILM COATED ORAL at 14:45

## 2020-07-11 RX ADMIN — VALACYCLOVIR HYDROCHLORIDE 500 MG: 500 TABLET, FILM COATED ORAL at 19:12

## 2020-07-11 RX ADMIN — ACETAMINOPHEN 650 MG: 325 TABLET, FILM COATED ORAL at 09:41

## 2020-07-11 RX ADMIN — ACETAMINOPHEN 650 MG: 325 TABLET, FILM COATED ORAL at 05:51

## 2020-07-11 RX ADMIN — MAGNESIUM SULFATE IN WATER 2 G/HR: 40 INJECTION, SOLUTION INTRAVENOUS at 06:32

## 2020-07-11 RX ADMIN — LABETALOL 20 MG/4 ML (5 MG/ML) INTRAVENOUS SYRINGE 20 MG: at 04:12

## 2020-07-11 RX ADMIN — ACETAMINOPHEN 650 MG: 325 TABLET, FILM COATED ORAL at 19:12

## 2020-07-11 RX ADMIN — SODIUM CHLORIDE, POTASSIUM CHLORIDE, SODIUM LACTATE AND CALCIUM CHLORIDE 10 ML/HR: 600; 310; 30; 20 INJECTION, SOLUTION INTRAVENOUS at 01:32

## 2020-07-11 RX ADMIN — Medication 340 ML/HR: at 01:10

## 2020-07-11 RX ADMIN — LIDOCAINE HYDROCHLORIDE 10 ML: 10 INJECTION, SOLUTION EPIDURAL; INFILTRATION; INTRACAUDAL; PERINEURAL at 01:17

## 2020-07-11 RX ADMIN — DOCUSATE SODIUM 50 MG AND SENNOSIDES 8.6 MG 1 TABLET: 8.6; 5 TABLET, FILM COATED ORAL at 19:12

## 2020-07-11 RX ADMIN — SODIUM CHLORIDE, POTASSIUM CHLORIDE, SODIUM LACTATE AND CALCIUM CHLORIDE 75 ML/HR: 600; 310; 30; 20 INJECTION, SOLUTION INTRAVENOUS at 11:46

## 2020-07-11 RX ADMIN — IBUPROFEN 800 MG: 800 TABLET ORAL at 17:59

## 2020-07-11 RX ADMIN — DOCUSATE SODIUM 50 MG AND SENNOSIDES 8.6 MG 1 TABLET: 8.6; 5 TABLET, FILM COATED ORAL at 09:41

## 2020-07-11 RX ADMIN — IBUPROFEN 800 MG: 800 TABLET ORAL at 11:45

## 2020-07-11 RX ADMIN — VALACYCLOVIR HYDROCHLORIDE 500 MG: 500 TABLET, FILM COATED ORAL at 09:41

## 2020-07-11 RX ADMIN — MAGNESIUM SULFATE IN WATER 2 G/HR: 40 INJECTION, SOLUTION INTRAVENOUS at 16:30

## 2020-07-11 RX ADMIN — IBUPROFEN 800 MG: 800 TABLET ORAL at 05:51

## 2020-07-11 RX ADMIN — LABETALOL 20 MG/4 ML (5 MG/ML) INTRAVENOUS SYRINGE 20 MG: at 02:40

## 2020-07-11 NOTE — PROVIDER NOTIFICATION
07/11/20 0411   Provider Notification   Provider Name/Title Dr. Kennedy    Method of Notification Phone   Notification Reason Vital Signs Change     Patient had sustained severe range blood pressures again. Per Dr. Kennedy give another 20 mg of labetalol since it has been over an hour since last dose. Please monitor BP q10 min over one hour and if stable then can transfer to [facility].

## 2020-07-11 NOTE — PROGRESS NOTES
"Blood pressure (!) 165/89, temperature 98.1  F (36.7  C), temperature source Oral, resp. rate 18, height 1.778 m (5' 10\"), weight 85.7 kg (189 lb), last menstrual period 10/04/2019, not currently breastfeeding.  Patient Vitals for the past 24 hrs:   BP Temp Temp src Resp Height Weight   07/10/20 1852 (!) 165/89 -- -- -- -- --   07/10/20 1830 (!) 167/106 -- -- -- -- --   07/10/20 1814 (!) 157/97 -- -- -- -- --   07/10/20 1809 (!) 164/91 -- -- -- -- --   07/10/20 1758 (!) 185/107 98.1  F (36.7  C) Oral 18 -- --   07/10/20 1354 (!) 139/97 -- -- -- -- --   07/10/20 1304 (!) 140/92 -- -- -- -- --   07/10/20 1241 (!) 165/86 -- -- -- -- --   07/10/20 1224 (!) 158/99 -- -- -- -- --   07/10/20 1213 -- 98.2  F (36.8  C) Oral 18 1.778 m (5' 10\") 85.7 kg (189 lb)     Sustained severe range BP after treatment.  General appearance: comfortable, sitting in bed.  CONTRACTIONS: mild, every 2-6 minutes  Pitocin- none,  Antibiotics- PCN  FETAL HEART TONES: continuous EFM- baseline 130 with moderate variability and positive accelerations. No decelerations.  ROM: not ruptured  PELVIC EXAM: deferred    ASSESSMENT:  ==============  IUP @ 40w0d in early labor with pre-eclampsia with SF   Fetal Heart Rate Tracing category one  GBS- positive- adeq tx    PLAN:  ===========  Discussed continued elevated blood pressures despite treatment.  Recommend transfer to MD care.  Pt and partner agreeable.  Answered patient and partner's questions/concerns.    MD team updated.  Dr. Hernandez in to discuss with patient and partner.    GUANAKO McneillM    "

## 2020-07-11 NOTE — PLAN OF CARE
BP's mild range. Pt had dizziness this morning, which has since resolved. Complaining of dull HA, Reflexes 3+, 1 beat of clonus in left lower extremity. 2+ edema in BLE. Pt denies visual changes or RUQ pain. Voiding without difficulty and tolerating regular diet. Mg infusing at 2 g/hr with LR at 125 ml/hr. Formula feeding infant per pt request. Pain controlled with Tylenol and Ibuprofen. Bonding well with infant. Continue plan of care.

## 2020-07-11 NOTE — PLAN OF CARE
Patient arrived to [facility] unit via wheelchair at 0525,with belongings, accompanied by spouse/ significant other, with infant in arms. Received report from GARY Sanders and checked bands. Unit and room orientation completd. Call light given; no concerns present at this time. Continue with plan of care.

## 2020-07-11 NOTE — PLAN OF CARE
of baby girl at 0108, unmedicated delivery. Oxytocin started after delivery of baby. Placenta delivered without complication at 0114. Mag infusing. 1st degree lac with repair. . Family bonding observed. Mother plans to formula feed.

## 2020-07-11 NOTE — PROVIDER NOTIFICATION
FANNY Estes notified via electronic page then phone that patient had two severe range BPs followed by 157/97.  Plan to hold labetalol and recheck in 15 minutes.

## 2020-07-11 NOTE — PROGRESS NOTES
Postpartum Magnesium Check    S: Patient feeling okay this morning. Tolerating magnesium. No headache, vision changes, upper abdominal pain, chest pain or SOB. Just tired.    O:   Patient Vitals for the past 24 hrs:   BP Temp Temp src Pulse Heart Rate Resp SpO2 Height Weight   07/11/20 0600 (!) 157/95 -- -- -- -- -- -- -- --   07/11/20 0530 (!) 149/98 98.4  F (36.9  C) Oral -- 72 16 98 % -- --   07/11/20 0514 (!) 144/91 -- -- -- -- -- -- -- --   07/11/20 0506 (!) 149/93 -- -- -- -- -- 97 % -- --   07/11/20 0454 (!) 144/90 -- -- -- -- -- 98 % -- --   07/11/20 0444 (!) 155/94 -- -- -- -- 16 -- -- --   07/11/20 0434 (!) 152/94 -- -- -- -- -- 98 % -- --   07/11/20 0424 (!) 153/90 -- -- -- -- -- 98 % -- --   07/11/20 0407 (!) 166/97 -- -- -- -- -- -- -- --   07/11/20 0352 (!) 163/92 -- -- -- -- -- -- -- --   07/11/20 0333 (!) 151/90 -- -- -- -- -- -- -- --   07/11/20 0323 (!) 150/91 -- -- -- -- -- 99 % -- --   07/11/20 0313 (!) 146/86 -- -- -- -- -- 99 % -- --   07/11/20 0305 (!) 148/93 -- -- -- -- -- 99 % -- --   07/11/20 0253 (!) 155/100 -- -- -- -- -- -- -- --   07/11/20 0236 (!) 173/96 -- -- -- -- -- -- -- --   07/11/20 0219 (!) 175/98 -- -- -- -- -- -- -- --   07/11/20 0215 (!) 173/100 -- -- -- -- -- 99 % -- --   07/11/20 0200 (!) 149/98 -- -- -- -- -- 98 % -- --   07/11/20 0145 (!) 146/93 -- -- -- -- -- 98 % -- --   07/11/20 0130 (!) 151/100 98.2  F (36.8  C) Oral -- -- -- 97 % -- --   07/11/20 0117 (!) 145/94 -- -- -- -- -- -- -- --   07/11/20 0002 (!) 148/77 -- -- -- -- -- 98 % -- --   07/10/20 2256 (!) 148/83 -- -- -- -- -- 97 % -- --   07/10/20 2200 -- -- -- -- -- -- 98 % -- --   07/10/20 2146 137/76 98.1  F (36.7  C) Oral -- -- -- 98 % -- --   07/10/20 2130 -- -- -- -- -- -- 97 % -- --   07/10/20 2113 (!) 141/83 -- -- -- -- -- -- -- --   07/10/20 2100 -- -- -- -- -- -- 98 % -- --   07/10/20 2042 (!) 144/84 -- -- -- -- -- 97 % -- --   07/10/20 2038 139/81 -- -- -- -- -- -- -- --   07/10/20 2033 (!) 176/90 -- --  "-- -- -- -- -- --   07/10/20 2026 129/74 -- -- -- -- -- 98 % -- --   07/10/20 2022 139/74 -- -- -- -- -- 98 % -- --   07/10/20 2017 138/76 -- -- -- -- -- 98 % -- --   07/10/20 2012 130/74 -- -- -- -- -- 98 % -- --   07/10/20 2008 134/75 -- -- -- -- -- -- -- --   07/10/20 2003 132/79 -- -- -- -- 16 98 % -- --   07/10/20 1957 (!) 164/80 -- -- -- -- 18 98 % -- --   07/10/20 1950 (!) 153/85 -- -- -- -- 18 98 % -- --   07/10/20 194 (!) 158/82 -- -- -- -- -- -- -- --   07/10/20 193 (!) 142/92 -- -- -- -- -- -- -- --   07/10/20 192 (!) 153/97 -- -- -- -- -- -- -- --   07/10/20 191 (!) 152/94 -- -- -- -- -- -- -- --   07/10/20 185 (!) 165/89 -- -- -- -- -- -- -- --   07/10/20 1830 (!) 167/106 -- -- -- -- -- -- -- --   07/10/20 181 (!) 157/97 -- -- -- -- -- -- -- --   07/10/20 1809 (!) 164/91 -- -- -- -- -- -- -- --   07/10/20 1758 (!) 185/107 98.1  F (36.7  C) Oral -- -- 18 -- -- --   07/10/20 1354 (!) 139/97 -- -- -- -- -- -- -- --   07/10/20 1304 (!) 140/92 -- -- -- -- -- -- -- --   07/10/20 1241 (!) 165/86 -- -- -- -- -- -- -- --   07/10/20 1224 (!) 158/99 -- -- -- -- -- -- -- --   07/10/20 1213 -- 98.2  F (36.8  C) Oral -- -- 18 -- 1.778 m (5' 10\") 85.7 kg (189 lb)       Gen: appears tired, but comfortable, NAD  Resp: CTAB  CV: RRR with no murmurs  Abdomen: soft, nontender  Ext: warm, well perfused, no edema, 2+ patellar and biceps reflexes. 0 beat of clonus.     I/O last 3 completed shifts:  In: 2565.07 [P.O.:2350; I.V.:215.07]  Out: 2200 [Urine:2200]    A/P: Rach Peterson is a  female s/p . Doing well. No s/s of worsening preeclampsia or magnesium toxicity.     # Pre-eclampsia with severe features:   - No signs or symptoms of magnesium toxicity or worsening pre-eclampsia  - HELLP labs notable for ALT 91, AST 62. Repeat labs ordered.  - Blood pressure most recently normal range. Treated with IV labetalol 20, 40 upon admission, then 20 x2 postpartum, last at (0414, ). IV antihypertensives PRN " for BP >160/110  - Continue IV magnesium sulfate 2g/h   - Plan to discontinue IV Magnesium at 24h postpartum  - Continue q4h Mag checks; next at 1030    # Postpartum:   - Continue routine postpartum care  - Pain: well controlled with current regimen  - ABLA: Hgb 11.7 prior to delivery, . AM hgb ordered  - GI: stool softeners and antiemetics PRN  - : Voiding. Bleeding light  - Feeding: Breastfeeding  - Contraception: need to discuss    Azalea Kennedy MD, MPH  Obstetrics and Gyncology, PGY-3  July 11, 2020 , 6:18 AM

## 2020-07-11 NOTE — PROVIDER NOTIFICATION
FANNY Estes CNM notified via electronic page of /[phone].  Plan made to give 20 mg IV labetalol.  20 mg labetalol given at 1841.

## 2020-07-11 NOTE — PLAN OF CARE
BP elevated. Has needed treatment for severe range pressures x2 post delivery. Denies signs/symptoms of pre-e. Reflexes +3, 1 beat clonus. Afebrile. Able to ambulate to bathroom with steady gait, voided pericare and fresh pads applied. Formula feeding baby. Denies pain. Ice pack on perineum. Mag and Pit continue to infuse per order. Plan for transfer to [facility] once BP stabilize, Alise VEGA called and updated.

## 2020-07-11 NOTE — L&D DELIVERY NOTE
DELIVERY NOTE:  Rach Peterson is a 29 year old  at 40w1d who was [facility] d/t pre-eclampsia.  Received Pitocin for augmentation. Developed severe range BPs and transferred to MD service.  Called to room for delivery as MD team in the OR. Pt with increased rectal pressure and urge to push. On exam, was found to be C/C/+1. Began pushing at that time. Pushed effectively with RN and CNM coaching. Head delivered OA with compound hand and restituted to YEN. Shoulders easily delivered under maternal effort.  Live female  delivered at 0108 under no anesthesia.  Spontaneous breath, vigorous cry, well flexed, HR>100. Infant directly to maternal abdomen, skin to skin. Delayed cord clamping for > 3 minutes then clamped x2 and cut by FOB. 20 units of pitocin infusing after baby with pt's consent. Cord blood obtained for typing. Intact placenta spontaneously delivered via Wilkins at 0114.  3 vessel cord. Fundus midline and firm with massage. Vagina, perineum, and rectum inspected,  1st degree laceration noted. Infiltrated with 1% lidocaine and repaired with 3-0 vicryl suture in the usual fashion. Hemostasis noted. Mother and infant stable; continued skin to skin. Good family bonding observed.     Delivery Note:   IUP at 40 weeks gestation delivered on 2020.     delivery of a viable Female infant.  Weight : 8 pounds   Apgars of 9 at 1 minute and 9 at 5 minutes.  Labor was augmented.  Medications administered  in labor:  Pain Rx None; Antibiotics Yes: PCN and IV antibiotics infused greater than 4 hours; Other n/a  Perineum: 1st degree  Placenta-mechanism: spontaneous, intact,  with a 3 vessel cord. IV oxytocin was given After delivery of baby  Quantitative Blood Loss was 411cc.   Complications of labor and delivery: Category two FHT tracing, Nuchal hand and Preeclampsia with severe features  Anticipated Discharge Date: 2020  Birth attendants: ANTWON Del Angel  GUANAKO Horner CN        Delivery Summary    Rach Peterson MRN# [id number]   Age: 29 year old YOB: 1990     ASSESSMENT & PLAN:          Labor Event Times    Labor onset date:  7/10/20 Onset time:   9:00 PM   Dilation complete date:  20 Complete time:  12:39 AM   Start pushing date/time:  2020 0042      Labor Events     labor?:  No   steroids:  None  Labor Type:  Augmentation  Predominate monitoring during 1st stage:  continuous electronic fetal monitoring     Antibiotics received during labor?:  Yes  Reason for Antibiotics:  GBS  Antibiotics received for GBS:  Penicillin  Antibiotics Given (GBS):  Greater than 4 hours prior to delivery     Rupture date/time: 20   Rupture type:  Spontaneous rupture of membranes occuring during spontaneous labor or augmentation  Fluid color:  Clear  Fluid odor:  Normal     Augmentation:  Oxytocin  Augmentation date/time:  7/10/20 2100   Indications for augmentation:  Preeclampsia   Labor partogram used?:  no      Delivery/Placenta Date and Time    Delivery Date:  20 Delivery Time:   1:08 AM   Placenta Date/Time:  2020  1:14 AM  Oxytocin given at the time of delivery:  after delivery of baby     Vaginal Counts     Initial count performed by 2 team members:   Two Team Members   Marilyn Horner RN       Needles Suture Taylors Sponges Instruments   Initial counts 2  5    Added to count       Final counts       Placed during labor Accounted for at the end of labor   NA NA   NA NA   NA NA           Apgars     1 Minute 5 Minute 10 Minute 15 Minute 20 Minute   Skin color: 1  1       Heart rate: 2  2       Reflex irritability: 2  2       Muscle tone: 2  2       Respiratory effort: 2  2       Total: 9  9       Apgars assigned by:  NATY GONSALES RN     Cord    Vessels:  3 Vessels Complications:  None   Cord Blood Disposition:  Lab Gases Sent?:  No      Skin to Skin and Feeding Plan    Skin to skin initiation date/time:  1/7/1841    Skin to skin with:  Mother  Skin to skin end date/time:        Delivery (Maternal) (Provider to Complete) ([id number])       Blood Loss  Mother: Rach Peterson #[id number]   Start of Mother's Information    IO Blood Loss  07/10/20 2100 - 07/11/20 0138    Delivery QBL (mL) Hospital Encounter 411 mL    Total  411 mL         End of Mother's Information  Mother: Rach Peterson #[id number]         Delivery - Provider to Complete ([id number])     Other personnel:   Provider Role   Marilyn Saeed, RN Delivery Nurse   Martina Du RN Registered Nurse         Placenta    Date/Time:  7/11/2020  1:14 AM     Anesthesia    Method:  Local               GUANAKO Mcneill CNM

## 2020-07-11 NOTE — PROVIDER NOTIFICATION
FREDO Horner and Dr Hernandez at bedside discussing plan to turn care to MDs and start magnesium.  Pt states agreement with plan.

## 2020-07-11 NOTE — PROGRESS NOTES
Rach Peterson  : 1990  MRN: [id number]    Postpartum Magnesium Check    S: Patient feeling well. Tolerating magnesium. Felt groggy with a slight headache when waking up this morning, but has since resolved. No headache, vision changes, upper abdominal pain, chest pain or SOB.     O:   Patient Vitals for the past 24 hrs:   BP Temp Temp src Pulse Heart Rate Resp SpO2 Height Weight   20 0943 128/84 -- -- -- -- 16 98 % -- --   20 0803 135/87 98  F (36.7  C) Oral 68 -- 16 98 % -- --   20 0631 130/81 -- -- -- -- -- -- -- --   20 0600 (!) 157/95 -- -- -- -- -- -- -- --   20 0530 (!) 149/98 98.4  F (36.9  C) Oral -- 72 16 98 % -- --   20 0514 (!) 144/ -- -- -- -- -- -- -- --   20 0506 (!) 149/93 -- -- -- -- -- 97 % -- --   20 0454 (!) 144/ -- -- -- -- -- 98 % -- --   20 0444 (!) 155/94 -- -- -- -- 16 -- -- --   20 0434 (!) 152/94 -- -- -- -- -- 98 % -- --   20 0424 (!) 153/90 -- -- -- -- -- 98 % -- --   20 0407 (!) 166/97 -- -- -- -- -- -- -- --   20 0352 (!) 163/92 -- -- -- -- -- -- -- --   20 0333 (!) 151/90 -- -- -- -- -- -- -- --   20 0323 (!) 150/91 -- -- -- -- -- 99 % -- --   20 0313 (!) 146/86 -- -- -- -- -- 99 % -- --   20 0305 (!) 148/93 -- -- -- -- -- 99 % -- --   20 0253 (!) 155/100 -- -- -- -- -- -- -- --   20 0236 (!) 173/96 -- -- -- -- -- -- -- --   20 0219 (!) 175/98 -- -- -- -- -- -- -- --   20 0215 (!) 173/100 -- -- -- -- -- 99 % -- --   20 0200 (!) 149/98 -- -- -- -- -- 98 % -- --   20 0145 (!) 146/93 -- -- -- -- -- 98 % -- --   20 0130 (!) 151/100 98.2  F (36.8  C) Oral -- -- -- 97 % -- --   20 0117 (!) 145/94 -- -- -- -- -- -- -- --   20 0002 (!) 148/77 -- -- -- -- -- 98 % -- --   07/10/20 2256 (!) 148/83 -- -- -- -- -- 97 % -- --   07/10/20 2200 -- -- -- -- -- -- 98 % -- --   07/10/20 2146 137/76 98.1  F (36.7  C) Oral -- -- -- 98  "% -- --   07/10/20 2130 -- -- -- -- -- -- 97 % -- --   07/10/20 2113 (!) 141/83 -- -- -- -- -- -- -- --   07/10/20 2100 -- -- -- -- -- -- 98 % -- --   07/10/20 2042 (!) 144/84 -- -- -- -- -- 97 % -- --   07/10/20 2038 139/81 -- -- -- -- -- -- -- --   07/10/20 2033 (!) 176/90 -- -- -- -- -- -- -- --   07/10/20 2026 129/74 -- -- -- -- -- 98 % -- --   07/10/20 2022 139/74 -- -- -- -- -- 98 % -- --   07/10/20 2017 138/76 -- -- -- -- -- 98 % -- --   07/10/20 2012 130/74 -- -- -- -- -- 98 % -- --   07/10/20 2008 134/75 -- -- -- -- -- -- -- --   07/10/20 2003 132/79 -- -- -- -- 16 98 % -- --   07/10/20 1957 (!) 164/80 -- -- -- -- 18 98 % -- --   07/10/20 1950 (!) 153/85 -- -- -- -- 18 98 % -- --   07/10/20 1946 (!) 158/82 -- -- -- -- -- -- -- --   07/10/20 1937 (!) 142/92 -- -- -- -- -- -- -- --   07/10/20 1925 (!) 153/97 -- -- -- -- -- -- -- --   07/10/20 1915 (!) 152/94 -- -- -- -- -- -- -- --   07/10/20 1852 (!) 165/89 -- -- -- -- -- -- -- --   07/10/20 1830 (!) 167/106 -- -- -- -- -- -- -- --   07/10/20 1814 (!) 157/97 -- -- -- -- -- -- -- --   07/10/20 1809 (!) 164/91 -- -- -- -- -- -- -- --   07/10/20 1758 (!) 185/107 98.1  F (36.7  C) Oral -- -- 18 -- -- --   07/10/20 1354 (!) 139/97 -- -- -- -- -- -- -- --   07/10/20 1304 (!) 140/92 -- -- -- -- -- -- -- --   07/10/20 1241 (!) 165/86 -- -- -- -- -- -- -- --   07/10/20 1224 (!) 158/99 -- -- -- -- -- -- -- --   07/10/20 1213 -- 98.2  F (36.8  C) Oral -- -- 18 -- 1.778 m (5' 10\") 85.7 kg (189 lb)       Gen: appears well, comfortable with baby in arms, NAD  Resp: CTAB  CV: RRR with no murmurs  Abdomen: soft, nontender  Ext: warm, well perfused, no edema  Neuro: 2+ patellar and biceps reflexes. No clonus.     I/O  I/O last 3 completed shifts:  In: 4288.46 [P.O.:2850; I.V.:1438.46]  Out: 3494 [Urine:3000; Blood:494]  I/O this shift:  In: 240 [P.O.:240]  Out: 1000 [Urine:1000]    Labs:  Recent Labs   Lab 07/10/20  2007 07/10/20  1219   HGB 11.7 12.7    181   CR 0.80 " 0.82   AST 64* 62*   ALT 91* 91*     A/P: Rach Peterson is a  female PPD#0 s/p . Doing well. No s/s of worsening preeclampsia or magnesium toxicity.      # Pre-eclampsia with severe features:   - No signs or symptoms of magnesium toxicity or worsening pre-eclampsia  - HELLP labs notable for ALT 91, AST 62. Repeat labs ordered.  - Blood pressure most recently normal range. Treated with IV labetalol 20, 40 upon admission, then 20 x2 postpartum, last at (0414, ). IV antihypertensives PRN for BP >160/110  - Continue IV magnesium sulfate 2g/h   - Plan to discontinue IV Magnesium at 24h postpartum  - Continue q4h Mag checks; next at 1530     # Postpartum:   - Continue routine postpartum care  - Pain: well controlled with current regimen  - ABLA: Hgb 11.7 prior to delivery, . AM hgb ordered  - GI: stool softeners and antiemetics PRN  - : Voiding. Bleeding light  - Feeding: Breastfeeding  - Contraception: need to discuss    María Gonzalez MD  Obstetrics and Gynecology, PGY-1  2020, 11:28 AM     Appreciate note by Dr. Gonzalez. Patient has been seen and examined by me separate from the resident, agree with above note. Bps mild range, reviewed possible need to start medications if increases. Continue magnesium through tonight. Repeat labs with rising LFTs, creat. Will monitor closely.       Josefa Kwan MD  6:19 PM

## 2020-07-11 NOTE — PROVIDER NOTIFICATION
FREDO Horner CNM notified via electronic page that /89 10 minutes after labetalol given, so 40mg of labetalol given per orders.

## 2020-07-11 NOTE — PLAN OF CARE
Data: Rach Peterson transferred to [id number] via wheelchair at 0520. Baby transferred via parent's arms.  Action: Receiving unit notified of transfer: Yes. Patient and family notified of room change. Report given to Alise at 0525. Belongings sent to receiving unit. Accompanied by Registered Nurse and . Oriented patient to surroundings. Call light within reach. ID bands double-checked with receiving RN.  Response: Patient tolerated transfer and is stable.

## 2020-07-11 NOTE — PROVIDER NOTIFICATION
07/10/20 2026   Provider Notification   Provider Name/Title Dr. Hernandez   Method of Notification At Bedside   Notification Reason SVE     SVE 5/100/-1. Discussed augmentation with oxytocin given pre-e dx. Patient agreeable.

## 2020-07-11 NOTE — DISCHARGE SUMMARY
[facility] Discharge Summary    Rach Peterson MRN# [id number]   Age: 29 year old YOB: 1990     Date of Admission:  7/10/2020  Date of Discharge:  7/15/2020  Admitting Physician:  Nikkie Hernandez MD  Discharge Physician:  Jessica Mendoza MD    Admit Dx:   - Intrauterine pregnancy at 40w0d   - Pre-eclampsia without severe features  - H/o HSV  - Anxiety    Discharge Dx:  - Same as above, s/p   - Pre-eclampsia with severe features  - H/o HSV  - Anxiety    Procedures:  - Spontaneous vaginal delivery  - IV magnesium    Admit HPI/Labor Course:  Rach Peterson is a 29 year old  at 40w0d who [facility] d/t pre-eclampsia to CNM service. Received Pitocin for augmentation. Developed severe range BPs and transferred to MD service, IV magnesium started and severe range BP treated and improved with IV labetalol.  CNM called to room for delivery as MD team in the OR. Head delivered OA with compound hand and restituted to YEN. Shoulders easily delivered under maternal effort.  Live female  delivered at 0108 under no anesthesia. Intact placenta spontaneously. Vagina, perineum, and rectum inspected,  1st degree laceration noted. Infiltrated with 1% lidocaine and repaired with 3-0 vicryl suture in the usual fashion.  ml. Please see her Admission H&P and Delivery Summary for further details.    Postpartum Course:  Her postpartum course was complicated by pre-eclampsia with severe features. She was continued on IV magnesium for 24h postpartum. She had elevated liver enzymes that were downtrending postpartum. She was started on Nifedipine XL that was titrated to 90 mg and hydrochlorothiazide 25 mg with improvement in blood pressure. She diuresed well. On PPD#4, she was meeting all of her postpartum goals and deemed stable for discharge. She was voiding without difficulty, tolerating a regular diet without nausea and vomiting, her  pain was well controlled on oral pain medicines and her lochia was appropriate.    ABLA.  Clinically stable.  Her hemoglobin prior to delivery was 12.7 and after delivery was 10.9.  Her Rh status was positive, and Rhogam was not indicated.     Discharge Medications:     Review of your medicines      START taking      Dose / Directions   hydrochlorothiazide 25 MG tablet  Commonly known as:  HYDRODIURIL      Dose:  25 mg  Take 1 tablet (25 mg) by mouth every 24 hours  Quantity:  45 tablet  Refills:  0     ibuprofen 600 MG tablet  Commonly known as:  ADVIL/MOTRIN      Dose:  600 mg  Take 1 tablet (600 mg) by mouth every 6 hours as needed for moderate pain Start after delivery  Quantity:  60 tablet  Refills:  0     NIFEdipine ER 90 MG 24 hr tablet  Commonly known as:  ADALAT CC      Dose:  90 mg  Take 1 tablet (90 mg) by mouth daily  Quantity:  45 tablet  Refills:  0        CONTINUE these medicines which have NOT CHANGED      Dose / Directions   acetaminophen 325 MG tablet  Commonly known as:  TYLENOL  Used for:  Supervision of other normal pregnancy, [facility] CNM      Dose:  650 mg  Take 2 tablets (650 mg) by mouth every 6 hours as needed for mild pain Start after Delivery.  Quantity:  100 tablet  Refills:  0     CALCIUM PO      Refills:  0     cyanocobalamin 1000 MCG tablet  Commonly known as:  VITAMIN B-12  Used for:  Supervision of other normal pregnancy      Dose:  1,000 mcg  Take 1 tablet (1,000 mcg) by mouth daily  Quantity:  60 tablet  Refills:  3     ferrous sulfate 325 (65 Fe) MG tablet  Commonly known as:  FEROSUL  Used for:  Supervision of other normal pregnancy      Dose:  325 mg  Take 1 tablet (325 mg) by mouth daily (with breakfast)  Quantity:  60 tablet  Refills:  3     Prenatal Vitamin 27-0.8 MG Tabs      Refills:  0     senna-docusate 8.6-50 MG tablet  Commonly known as:  SENOKOT-S/PERICOLACE  Used for:  Supervision of other normal pregnancy, S CNM      Dose:  1 tablet  Take 1 tablet by mouth daily Start  after delivery.  Quantity:  100 tablet  Refills:  0     valACYclovir 500 MG tablet  Commonly known as:  VALTREX  Used for:  Herpes simplex type 2 infection      Dose:  500 mg  Take 1 tablet (500 mg) by mouth daily  Quantity:  90 tablet  Refills:  3     vitamin C 250 MG tablet  Commonly known as:  ASCORBIC ACID  Used for:  Supervision of other normal pregnancy      Dose:  250 mg  Take 1 tablet (250 mg) by mouth daily  Quantity:  60 tablet  Refills:  3           Where to get your medicines      These medications were sent to [facility] [address]  [address] [address]    Phone:  [phone]     hydrochlorothiazide 25 MG tablet    ibuprofen 600 MG tablet    NIFEdipine ER 90 MG 24 hr tablet           Discharge/Disposition:  Rach Peterson was discharged to home in stable condition with the following instructions/medications:  1) Call for temperature > 100.4, bright red vaginal bleeding >1 pad an hour x 2 hours, foul smelling vaginal discharge, pain not controlled by usual oral pain meds, persistent nausea and vomiting not controlled on medications  2) She desired condoms for contraception.  3) For feeding she decided to bottle feed.  4) She was instructed to follow-up with her primary OB in 6 weeks for a routine postpartum visit and 1 week for a blood pressure check. Parameters to call were discussed with patient (>160/105, or <100/60)    Azalea Kennedy MD, MPH  OBGYN PGY-3  7/15/2020 6:57 AM     [facility] staff:  Appreciate note by Dr. Kennedy.  I have seen and examined the patient without the resident. I have reviewed, edited, and agree with the note.        Jessica Mendoza MD, FACOG  7/15/2020  7:47 AM

## 2020-07-11 NOTE — PLAN OF CARE
[id number]  Patient blood pressures elevated 140/150s/90s. Last check 130/81. Reflexes +3, 1 beat clonus. Fundus firm and midline. Light lochia. Mag/LR infusing. Denies side effects of magnesium expect on/off headache. Monitoring I &Os. Voided once since delivery. Declined breastfeeding or pumping. Continue with plan of care

## 2020-07-11 NOTE — PROVIDER NOTIFICATION
07/11/20 0035   Provider Notification   Provider Name/Title FREDO Horner CNM   Method of Notification In Department   Request Evaluate in Person   Notification Reason Pain  (Patient feeling urge to push)     RN performed SVE, head low and cervix felt in front. Patient feeling urge to push. ANTWON called to bedside as MD team in surgery.

## 2020-07-11 NOTE — PROGRESS NOTES
Rach Peterson  : 1990  MRN: [id number]    Postpartum Progress Note    S: Patient is overall feeling well, feels much better off the Magnesium. Headache resolved when magnesium was stopped. Denies HA, vision changes, chest pain, shortness of breath or RUQ pain.    O:   Patient Vitals for the past 24 hrs:   BP Temp Temp src Heart Rate Resp SpO2 Weight   20 0817 (!) 141/97 97.7  F (36.5  C) Oral 71 16 -- --   20 0500 -- -- -- -- -- -- 80.5 kg (177 lb 6.4 oz)   20 0455 (!) 144/92 97.8  F (36.6  C) Oral 67 16 -- --   20 0130 (!) 144/98 -- -- -- -- -- --   20 0115 (!) 154/89 97.9  F (36.6  C) Oral 65 16 -- --   20 2059 133/80 98  F (36.7  C) Oral 74 16 100 % --   20 1640 (!) 139/94 97.8  F (36.6  C) Oral 69 18 99 % --   20 1438 130/87 97.5  F (36.4  C) Oral -- 16 -- --   20 1148 (!) 140/91 -- -- -- 16 98 % --   20 0943 128/84 -- -- -- 16 98 % --     Gen: appears well, comfortable with baby in arms, NAD  Resp: breathing comfortably on room air  CV: regular rate, well perfused  Abdomen: soft, nontender  Ext: warm, well perfused, no edema    I/O  I/O last 3 completed shifts:  In: 5053.75 [P.O.:4330; I.V.:723.75]  Out: 4350 [Urine:4350]  No intake/output data recorded.    Labs:  Recent Labs   Lab 20  0720  1216 07/10/20  2007 07/10/20  1219   HGB 11.0* 10.8* 11.7 12.7   *  --  165 181   CR 0.92 0.90 0.80 0.82   AST 56* 96* 64* 62*   ALT 84* 112* 91* 91*     A/P: Rach Peterson is a  female PPD#1 s/p . Doing well.     # Pre-eclampsia with severe features  - No signs or symptoms of worsening pre-eclampsia  - HELLP labs notable for ALT 91, AST 62. Repeat labs ordered.  - Blood pressure most recently normal to low mild range range. Consider starting 30 mg Procardia XL if high mild range BPs  - Treated with IV labetalol 20, 40 upon admission, then 20 x2 postpartum, last at (0414, ). IV antihypertensives PRN for BP  >160/110  - S/p 24 hours Mag  - D#1 Procardia 30 mg XL     # Postpartum   - Continue routine postpartum care  - Pain: well controlled with current regimen  - ABLA: Hgb 11.7 prior to delivery, . AM hgb ordered  - GI: stool softeners and antiemetics PRN  - : Voiding. Bleeding light  - Feeding: Breastfeeding  - Contraception: need to discuss    Myrna Lam MD  OB/GYN PGY-3  07/12/20 8:21 AM

## 2020-07-11 NOTE — PROGRESS NOTES
Rach Peterson  : 1990  MRN: [id number]    Postpartum Magnesium Check    S: Patient feeling well. Tolerating magnesium. Feeling a dull headache that she attributes to Mag, but otherwise feeling well. No vision changes, upper abdominal pain, chest pain or SOB.     O:   Patient Vitals for the past 24 hrs:   BP Temp Temp src Pulse Heart Rate Resp SpO2   20 1438 130/87 97.5  F (36.4  C) Oral -- -- 16 --   20 1148 (!) 140/91 -- -- -- -- 16 98 %   20 0943 128/84 -- -- -- -- 16 98 %   20 0803 135/87 98  F (36.7  C) Oral 68 -- 16 98 %   20 0631 130/81 -- -- -- -- -- --   20 0600 (!) 157/95 -- -- -- -- -- --   20 0530 (!) 149/98 98.4  F (36.9  C) Oral -- 72 16 98 %   20 0514 (!) 144/91 -- -- -- -- -- --   20 0506 (!) 149/93 -- -- -- -- -- 97 %   20 0454 (!) 144/90 -- -- -- -- -- 98 %   20 0444 (!) 155/94 -- -- -- -- 16 --   20 0434 (!) 152/94 -- -- -- -- -- 98 %   20 0424 (!) 153/90 -- -- -- -- -- 98 %   20 0407 (!) 166/97 -- -- -- -- -- --   20 0352 (!) 163/92 -- -- -- -- -- --   20 0333 (!) 151/90 -- -- -- -- -- --   20 0323 (!) 150/91 -- -- -- -- -- 99 %   20 0313 (!) 146/86 -- -- -- -- -- 99 %   20 0305 (!) 148/93 -- -- -- -- -- 99 %   20 0253 (!) 155/100 -- -- -- -- -- --   20 0236 (!) 173/96 -- -- -- -- -- --   20 0219 (!) 175/98 -- -- -- -- -- --   20 0215 (!) 173/100 -- -- -- -- -- 99 %   20 0200 (!) 149/98 -- -- -- -- -- 98 %   20 0145 (!) 146/93 -- -- -- -- -- 98 %   20 0130 (!) 151/100 98.2  F (36.8  C) Oral -- -- -- 97 %   207 (!) 145/94 -- -- -- -- -- --   20 0002 (!) 148/77 -- -- -- -- -- 98 %   07/10/20 2256 (!) 148/83 -- -- -- -- -- 97 %   07/10/20 2200 -- -- -- -- -- -- 98 %   07/10/20 2146 137/76 98.1  F (36.7  C) Oral -- -- -- 98 %   07/10/20 2130 -- -- -- -- -- -- 97 %   07/10/20 2113 (!) 141/83 -- -- -- -- -- --    07/10/20 2100 -- -- -- -- -- -- 98 %   07/10/20 2042 (!) 144/84 -- -- -- -- -- 97 %   07/10/20 2038 139/81 -- -- -- -- -- --   07/10/20 2033 (!) 176/90 -- -- -- -- -- --   07/10/20 2026 129/74 -- -- -- -- -- 98 %   07/10/20 2022 139/74 -- -- -- -- -- 98 %   07/10/20 2017 138/76 -- -- -- -- -- 98 %   07/10/20 2012 130/74 -- -- -- -- -- 98 %   07/10/20 2008 134/75 -- -- -- -- -- --   07/10/20 2003 132/79 -- -- -- -- 16 98 %   07/10/20 1957 (!) 164/80 -- -- -- -- 18 98 %   07/10/20 1950 (!) 153/85 -- -- -- -- 18 98 %   07/10/20 1946 (!) 158/82 -- -- -- -- -- --   07/10/20 193 (!) 142/92 -- -- -- -- -- --   07/10/20 192 (!) 153/97 -- -- -- -- -- --   07/10/20 1915 (!) 152/94 -- -- -- -- -- --   07/10/20 185 (!) 165/89 -- -- -- -- -- --   07/10/20 1830 (!) 167/106 -- -- -- -- -- --   07/10/20 1814 (!) 157/97 -- -- -- -- -- --   07/10/20 180 (!) 164/91 -- -- -- -- -- --   07/10/20 1758 (!) 185/107 98.1  F (36.7  C) Oral -- -- 18 --       Gen: appears well, comfortable with baby in arms, NAD  Resp: CTAB  CV: RRR with no murmurs  Abdomen: soft, nontender  Ext: warm, well perfused, no edema  Neuro: 2+ patellar and biceps reflexes. No clonus.     I/O  I/O last 3 completed shifts:  In: 6192.21 [P.O.:4030; I.V.:2162.21]  Out: 5494 [Urine:5000; Blood:494]  No intake/output data recorded.    Labs:  Recent Labs   Lab 20  1216 07/10/20  2007 07/10/20  1219   HGB 10.8* 11.7 12.7   PLT  --  165 181   CR 0.90 0.80 0.82   AST 96* 64* 62*   * 91* 91*     A/P: aRch Peterson is a  female PPD#0 s/p . Doing well. No s/s of worsening preeclampsia or magnesium toxicity.      # Pre-eclampsia with severe features:   - No signs or symptoms of magnesium toxicity or worsening pre-eclampsia  - HELLP labs notable for ALT 91, AST 62. Repeat labs ordered.  - Blood pressure most recently normal range. Treated with IV labetalol 20, 40 upon admission, then 20 x2 postpartum, last at (0414, ). IV antihypertensives  PRN for BP >160/110  - Continue IV magnesium sulfate 2g/h   - Plan to discontinue IV Magnesium at 24h postpartum  - Continue q4h Mag checks; next at 1930     # Postpartum:   - Continue routine postpartum care  - Pain: well controlled with current regimen  - ABLA: Hgb 11.7 prior to delivery, . AM hgb ordered  - GI: stool softeners and antiemetics PRN  - : Voiding. Bleeding light  - Feeding: Breastfeeding  - Contraception: need to discuss    María Gonzalez MD  Obstetrics and Gynecology, PGY-1  July 11, 2020, 11:28 AM     Appreciate note by Dr. Gonzalez. Patient has been seen and examined by me separate from the resident, agree with above note.     Josefa Kwan MD  6:26 PM

## 2020-07-11 NOTE — PROGRESS NOTES
"OBGYN Attending Transfer Note    S: Nervous about BP. \"I feel fine.\" No HA, vision changes, RUQ pain.     O:   Vitals:    07/10/20 1809 07/10/20 1814 07/10/20 1830 07/10/20 1852   BP: (!) 164/91 (!) 157/97 (!) 167/106 (!) 165/89   Resp:       Temp:       TempSrc:       Weight:       Height:         Gen: NAD  SVE: 4-5/80/0 at 1300 per Denise Estes CNM  FHT: 130/moderate/+accels/no decels  Rough and Ready: q4-9m    Component      Latest Ref Rng & Units 7/10/2020   WBC      4.0 - 11.0 10e9/L 9.2   RBC Count      3.8 - 5.2 10e12/L 3.91   Hemoglobin      11.7 - 15.7 g/dL 12.7   Hematocrit      35.0 - 47.0 % 36.4   MCV      78 - 100 fl 93   MCH      26.5 - 33.0 pg 32.5   MCHC      31.5 - 36.5 g/dL 34.9   RDW      10.0 - 15.0 % 12.7   Platelet Count      150 - 450 10e9/L 181   ABO       A   RH(D)       Pos   Antibody Screen       Neg     Component      Latest Ref Rng & Units 7/10/2020   Sodium      133 - 144 mmol/L 136   Potassium      3.4 - 5.3 mmol/L 4.3   Chloride      94 - 109 mmol/L 108   Carbon Dioxide      20 - 32 mmol/L 21   Anion Gap      3 - 14 mmol/L 7   Glucose      70 - 99 mg/dL 72   Urea Nitrogen      7 - 30 mg/dL 13   Creatinine      0.52 - 1.04 mg/dL 0.82     Component      Latest Ref Rng & Units 7/10/2020   Protein Random Urine      g/L 0.23   Protein Total Urine g/gr Creatinine      0 - 0.2 g/g Cr 0.43 (H)   AST      0 - 45 U/L 62 (H)   ALT      0 - 50 U/L 91 (H)   Uric Acid      2.6 - 6.0 mg/dL 5.6     A/P: 30 y/o  at 40w0d admitted to the midwifery service for IOL for gestational hypertension, now with pre-eclampsia with severe features based on sustained SR BP. Happily accept transfer.    1. IOL:   -presented in early labor, currently being expectantly managed. Ctx spaced apart, will discuss augmentation with oxytocin    2. Pre-e with SF:  -UPC 0.43, AST/ALT elevated though not twice normal  -discussed with Rach and her partner indications for, duration of magnesium therapy and expected side " effects  -s/p labetalol 20mg x 1 and 40mg x 1; continue hypertensive protocol and treat BP > 160/110    3. GBS POS:  -continue PCN per protocol    4. H/o HSV:  -has been using valtrex suppression since 37 weeks, bright light exam performed on admission normal    5. Pain management:  -undecided as to epidural    Anticipate .    Nikkie Hernandez MD, MSCI    [facility]/OBGYN

## 2020-07-11 NOTE — PROVIDER NOTIFICATION
07/11/20 0236   Provider Notification   Provider Name/Title Dr. Kennedy/Dr. Hernandez   Method of Notification In Department   Request Evaluate-Remote   Notification Reason Vital Signs Change     Sustained severe range pressures 15 minutes apart. Plan to give 20 mg of labetalol.

## 2020-07-11 NOTE — PROVIDER NOTIFICATION
07/11/20 0005   Provider Notification   Provider Name/Title Dr. Kennedy   Method of Notification In Department   Request Evaluate - Remote   Notification Reason Decels;Variability Change     Strip review. ED with min variability. Plan for RN SVE.

## 2020-07-11 NOTE — PROGRESS NOTES
Rach Peterson      MRN#: [id number]  Age: 29 year old      YOB: 1990      CNM Social Rounds    Pt noted sitting up in bed, has slight HA she thinks is from the Magnesium Sulfate but otherwise feeling well. Can't believe how quickly her labor went and that she did it without pain medication! Was so happy to get to have a midwife catch her baby since the doctor were in the OR but is also happy to have MD care for continued elevated BP.  Thinks her baby is perfect and is so excited to have her here. Is undecided who she will see in office postpartum - aware MD team is always available for consult prn if chooses to see midwives. It seems like pt will most likely be discharged with anti hypertensive medications and feels ok with that.   Reviewed CNM social rounds/involvement postpartum. Pt verbalized understanding of CNM social role.   Pt has no unidentified unmet needs at this time.  Denise MUJICA, CHAMPM

## 2020-07-11 NOTE — PROGRESS NOTES
"OBGYN Attending Progress Note    S: Feels like labor progress has stopped.    O:  BP (!) 176/90   Temp 98.1  F (36.7  C) (Oral)   Resp 16   Ht 1.778 m (5' 10\")   Wt 85.7 kg (189 lb)   LMP 10/04/2019   SpO2 98%   BMI 27.12 kg/m    Gen: NAD  SVE: 5/100/-1, intact BOW  FHT: 130/mod/+accels/no decels  Lucky: q4-10m     A/P: 30 y/o  at 40w0d admitted to the midwifery service for IOL for gestational hypertension, transferred to MD service because of development of pre-eclampsia with severe features based on sustained SR BP.      1. IOL:   -presented in early labor, currently being expectantly managed.   -Will augment with oxytocin per protocol     2. Pre-e with SF:  -UPC 0.43, AST/ALT elevated though not twice normal  -discussed with Rach and her partner indications for, duration of magnesium therapy and expected side effects  -s/p labetalol 20mg x 1 and 40mg x 1; continue hypertensive protocol and treat BP > 160/110     3. GBS POS:  -continue PCN per protocol     4. H/o HSV:  -has been using valtrex suppression since 37 weeks, bright light exam performed on admission normal     5. Pain management:  -undecided as to epidural     Anticipate .     Nikkie Hernandez MD, MSCI    [facility]/OBGYN  "

## 2020-07-11 NOTE — PLAN OF CARE
VSS and postpartum assessments WDL, except for elevated BP. Fundus firm at U/1. Scant lochia. Denies vision changes, nausea/vomiting, chest pain, shortness of breath or RUQ pain. Pt reports headache that resolves with pain medication. Voiding without difficulty and tolerating regular diet. Up and ambulating with steady gait. Independent with cares. Bonding well with infant. Formula feeding. Pain managed with tylenol and ibuprofen.  present and supportive. Will continue with postpartum cares and education per plan of care.

## 2020-07-12 LAB
ALT SERPL W P-5'-P-CCNC: 84 U/L (ref 0–50)
AST SERPL W P-5'-P-CCNC: 56 U/L (ref 0–45)
CREAT SERPL-MCNC: 0.92 MG/DL (ref 0.52–1.04)
GFR SERPL CREATININE-BSD FRML MDRD: 85 ML/MIN/{1.73_M2}
HGB BLD-MCNC: 11 G/DL (ref 11.7–15.7)
PLATELET # BLD AUTO: 144 10E9/L (ref 150–450)

## 2020-07-12 PROCEDURE — 25000132 ZZH RX MED GY IP 250 OP 250 PS 637: Performed by: STUDENT IN AN ORGANIZED HEALTH CARE EDUCATION/TRAINING PROGRAM

## 2020-07-12 PROCEDURE — 84460 ALANINE AMINO (ALT) (SGPT): CPT | Performed by: STUDENT IN AN ORGANIZED HEALTH CARE EDUCATION/TRAINING PROGRAM

## 2020-07-12 PROCEDURE — 82565 ASSAY OF CREATININE: CPT | Performed by: STUDENT IN AN ORGANIZED HEALTH CARE EDUCATION/TRAINING PROGRAM

## 2020-07-12 PROCEDURE — 85049 AUTOMATED PLATELET COUNT: CPT | Performed by: STUDENT IN AN ORGANIZED HEALTH CARE EDUCATION/TRAINING PROGRAM

## 2020-07-12 PROCEDURE — 85018 HEMOGLOBIN: CPT | Performed by: STUDENT IN AN ORGANIZED HEALTH CARE EDUCATION/TRAINING PROGRAM

## 2020-07-12 PROCEDURE — 25000132 ZZH RX MED GY IP 250 OP 250 PS 637

## 2020-07-12 PROCEDURE — 84450 TRANSFERASE (AST) (SGOT): CPT | Performed by: STUDENT IN AN ORGANIZED HEALTH CARE EDUCATION/TRAINING PROGRAM

## 2020-07-12 PROCEDURE — 12000001 ZZH R&B MED SURG/OB UMMC

## 2020-07-12 PROCEDURE — 36415 COLL VENOUS BLD VENIPUNCTURE: CPT | Performed by: STUDENT IN AN ORGANIZED HEALTH CARE EDUCATION/TRAINING PROGRAM

## 2020-07-12 RX ORDER — NIFEDIPINE 30 MG/1
30 TABLET, EXTENDED RELEASE ORAL DAILY
Status: DISCONTINUED | OUTPATIENT
Start: 2020-07-12 | End: 2020-07-13

## 2020-07-12 RX ADMIN — ACETAMINOPHEN 650 MG: 325 TABLET, FILM COATED ORAL at 05:40

## 2020-07-12 RX ADMIN — DOCUSATE SODIUM 50 MG AND SENNOSIDES 8.6 MG 2 TABLET: 8.6; 5 TABLET, FILM COATED ORAL at 08:19

## 2020-07-12 RX ADMIN — ACETAMINOPHEN 650 MG: 325 TABLET, FILM COATED ORAL at 01:21

## 2020-07-12 RX ADMIN — IBUPROFEN 800 MG: 800 TABLET ORAL at 01:21

## 2020-07-12 RX ADMIN — NIFEDIPINE 30 MG: 30 TABLET, FILM COATED, EXTENDED RELEASE ORAL at 08:19

## 2020-07-12 RX ADMIN — VALACYCLOVIR HYDROCHLORIDE 500 MG: 500 TABLET, FILM COATED ORAL at 08:19

## 2020-07-12 ASSESSMENT — MIFFLIN-ST. JEOR: SCORE: 1614.93

## 2020-07-12 NOTE — PLAN OF CARE
Patient has been stable denies any pain/discomfort. No HA, blurring of vision,SOB and RUQ/epigastric pain. BP were on mild  ranges. I and O monitored and recorded.  Independent with self and baby cares. Will continue with plan of care.

## 2020-07-12 NOTE — PLAN OF CARE
Data: Patient has had elevated BPs this shift in the 140s-150s/90s. G3 ordered nifedipine 30 mg daily to start this morning. All other vital signs within normal limits. Patient had HA that was relieved after magnesium was stopped at 0100. +3 reflexes bilaterally; 1 beat of clonus in right foot. Patient denies vision changes, RUQ pain; +1 edema bilaterally in feet. Postpartum checks within normal limits - fundus firm at U/1. Patient eating and drinking normally. Patient able to empty bladder independently and is up ambulating. No apparent signs of infection. Patient performing self cares and is able to care for infant.  Action: Patient medicated during the shift for pain. See MAR. Patient stated she was comfortable.   Response: Positive attachment behaviors observed with infant. Support person, , present.   Plan: Continue with plan of care.

## 2020-07-12 NOTE — PROVIDER NOTIFICATION
07/12/20 0136   Provider Notification   Provider Name/Title Dr. Lam (G3)   Method of Notification Electronic Page   Request Evaluate-Remote   Notification Reason Vital Signs Change;Status Update     Hey FYI pt had BP of 154/89 and repeat was 144/98. Mag just stopped at 0100. Still has HA. Thanks!

## 2020-07-13 LAB
ALT SERPL W P-5'-P-CCNC: 143 U/L (ref 0–50)
AST SERPL W P-5'-P-CCNC: 115 U/L (ref 0–45)
CREAT SERPL-MCNC: 0.72 MG/DL (ref 0.52–1.04)
GFR SERPL CREATININE-BSD FRML MDRD: >90 ML/MIN/{1.73_M2}
HGB BLD-MCNC: 10.9 G/DL (ref 11.7–15.7)
PLATELET # BLD AUTO: 151 10E9/L (ref 150–450)

## 2020-07-13 PROCEDURE — 85018 HEMOGLOBIN: CPT | Performed by: STUDENT IN AN ORGANIZED HEALTH CARE EDUCATION/TRAINING PROGRAM

## 2020-07-13 PROCEDURE — 25000132 ZZH RX MED GY IP 250 OP 250 PS 637: Performed by: STUDENT IN AN ORGANIZED HEALTH CARE EDUCATION/TRAINING PROGRAM

## 2020-07-13 PROCEDURE — 36415 COLL VENOUS BLD VENIPUNCTURE: CPT | Performed by: STUDENT IN AN ORGANIZED HEALTH CARE EDUCATION/TRAINING PROGRAM

## 2020-07-13 PROCEDURE — 82565 ASSAY OF CREATININE: CPT | Performed by: STUDENT IN AN ORGANIZED HEALTH CARE EDUCATION/TRAINING PROGRAM

## 2020-07-13 PROCEDURE — 84460 ALANINE AMINO (ALT) (SGPT): CPT | Performed by: STUDENT IN AN ORGANIZED HEALTH CARE EDUCATION/TRAINING PROGRAM

## 2020-07-13 PROCEDURE — 85049 AUTOMATED PLATELET COUNT: CPT | Performed by: STUDENT IN AN ORGANIZED HEALTH CARE EDUCATION/TRAINING PROGRAM

## 2020-07-13 PROCEDURE — 12000001 ZZH R&B MED SURG/OB UMMC

## 2020-07-13 PROCEDURE — 84450 TRANSFERASE (AST) (SGOT): CPT | Performed by: STUDENT IN AN ORGANIZED HEALTH CARE EDUCATION/TRAINING PROGRAM

## 2020-07-13 RX ORDER — NIFEDIPINE 30 MG/1
60 TABLET, EXTENDED RELEASE ORAL DAILY
Status: DISCONTINUED | OUTPATIENT
Start: 2020-07-13 | End: 2020-07-14

## 2020-07-13 RX ORDER — FUROSEMIDE 20 MG/1
10 TABLET ORAL ONCE
Status: COMPLETED | OUTPATIENT
Start: 2020-07-13 | End: 2020-07-13

## 2020-07-13 RX ADMIN — Medication 10 MG: at 20:14

## 2020-07-13 RX ADMIN — ACETAMINOPHEN 650 MG: 325 TABLET, FILM COATED ORAL at 02:05

## 2020-07-13 RX ADMIN — VALACYCLOVIR HYDROCHLORIDE 500 MG: 500 TABLET, FILM COATED ORAL at 20:14

## 2020-07-13 RX ADMIN — ACETAMINOPHEN 650 MG: 325 TABLET, FILM COATED ORAL at 16:49

## 2020-07-13 RX ADMIN — NIFEDIPINE 60 MG: 30 TABLET, FILM COATED, EXTENDED RELEASE ORAL at 08:07

## 2020-07-13 RX ADMIN — IBUPROFEN 800 MG: 800 TABLET ORAL at 16:49

## 2020-07-13 RX ADMIN — VALACYCLOVIR HYDROCHLORIDE 500 MG: 500 TABLET, FILM COATED ORAL at 11:06

## 2020-07-13 RX ADMIN — VALACYCLOVIR HYDROCHLORIDE 500 MG: 500 TABLET, FILM COATED ORAL at 02:05

## 2020-07-13 ASSESSMENT — MIFFLIN-ST. JEOR: SCORE: 1574.56

## 2020-07-13 NOTE — PLAN OF CARE
BPs elevated this morning. Procardia changed to 60mg from 30mg. Bp mildly elevated this afternoon.  Denies HA, SOB, epigastric pain or blurry vision. I&Os done. Very teary eyed this morning d/t no discharge today but understands reasonings. FOB here and very attentive to pt and baby. +2 reflexes and 1 beat clonus bilaterally. Will continue to monitor.

## 2020-07-13 NOTE — PLAN OF CARE
Data: BPs have remained elevated overnight, with the highest being 158/93. All other vital signs within normal limits. Patient denies HA, vision changes, or epigastric pain; +1 bilateral edema in feet. +2 to +3 reflexes bilaterally; 1 beat of clonus present. Postpartum checks within normal limits - fundus firm at U/3. Patient eating and drinking normally. Patient able to empty bladder independently and is up ambulating. No apparent signs of infection. Patient performing self cares and is able to care for infant.  Action: Patient medicated during the shift for pain. See MAR. Patient stated she was comfortable.   Response: Positive attachment behaviors observed with infant. Support person, , present.   Plan: Continue with plan of care.

## 2020-07-13 NOTE — PROVIDER NOTIFICATION
Just FYI, Bp 154/100 now so no IV labetalol. ALT/AST trending back up. Denies HA, SOB, epigastric pain, blurry vision. Will recheck BP again in 30mins.  Dr. Liu/G2 text paged and updated.

## 2020-07-13 NOTE — PROVIDER NOTIFICATION
07/13/20 0856   Provider Notification   Provider Name/Title DR Liu/G2   Method of Notification Electronic Page   Request Evaluate-Remote   Notification Reason Vital Signs Change   Current /114

## 2020-07-13 NOTE — PROVIDER NOTIFICATION
/108 and 170/107. Will do another BP in 15mins. Do you want me to give Procardia now or wait for next reading in 15mins? Text paged and updated Dr. Liu/g2

## 2020-07-13 NOTE — PROVIDER NOTIFICATION
07/13/20 0020   Provider Notification   Provider Name/Title Dr. Peres (G2)   Method of Notification Electronic Page   Request Evaluate-Remote   Notification Reason Status Update     Hey just FYI pt's /93; repeat was 146/91. No other sx. Thanks!

## 2020-07-13 NOTE — PROGRESS NOTES
Rach Peterson  : 1990  MRN: [id number]    Postpartum Progress Note    S: Patient doing well this morning. Headache has resolved. She is ambulating without difficulty. Voiding. No complaints this morning. Sleeping well. Relieved that blood pressure seems to be stabilizing.     O:   Patient Vitals for the past 24 hrs:   BP Temp Temp src Heart Rate Resp Weight   20 0600 -- -- -- -- -- 76.4 kg (168 lb 8 oz)   20 0400 (!) 144/89 98.3  F (36.8  C) Oral 63 16 --   20 0015 (!) 146/91 -- -- -- -- --   20 0000 (!) 158/93 98.2  F (36.8  C) Oral 63 16 --   20 2005 (!) 142/92 98.1  F (36.7  C) Oral 75 16 --   20 1607 (!) 141/90 98.2  F (36.8  C) Oral 74 16 --   20 1259 (!) 138/97 -- -- 79 16 --   20 0817 (!) 141/97 97.7  F (36.5  C) Oral 71 16 --     Gen: appears well, NAD  Resp: breathing comfortably on room air  CV: regular rate, well perfused  Abdomen: soft, nontender  Ext: warm, well perfused, no edema    I/O  UOP 2450 ml yesterday, 1500 ml since midnight    A/P: Rach Peterson is a  female PPD#2 s/p . Doing well.     # Pre-eclampsia with severe features  - No signs or symptoms of worsening pre-eclampsia  - HELLP labs notable for ALT 91>112>84, AST 62>96>56. Repeat labs ordered.  - Treated with IV labetalol 20, 40 upon admission, then 20 x2 postpartum, last at (0414, ). IV antihypertensives PRN for BP >160/110  - S/p 24 hours Mag  - D#2 Procardia 30 mg XL, high MR x1 at 0000, increase if above goal      # Postpartum   - Continue routine postpartum care  - Pain: well controlled with current regimen  - ABLA: Hgb 11.7 prior to delivery, . PPD#1 Hgb 11  - GI: stool softeners and antiemetics PRN  - : Voiding. Bleeding light  - Feeding: bottlefeeding  - Contraception: condoms    Dispo: DC today pending stability of blood pressure on current dose of Procardia.    Azalea Kennedy MD, MPH  OBGYN PGY-3  2020 6:55 AM     BP (!) 156/99    "Pulse 72   Temp 98  F (36.7  C) (Oral)   Resp 16   Ht 1.778 m (5' 10\")   Wt 76.4 kg (168 lb 8 oz)   LMP 10/04/2019   SpO2 100%   Breastfeeding Unknown   BMI 24.18 kg/m        (56)  ALT  143 (84     plts 151 (144)  hgb 10.9  Cr 0.72    The patient was seen and examined by me separately from the team.  I have reviewed and agree with the above note.  She is tearful this morning, really wanted to go home today, upset that her BP is elevated again, thinks that she is \"failing.\"  Reviewed Pre-eclampsia/HELLP syndrome and its usual course, why her presentation is not unusual.  Discussed that I do not feel comfortable with her going home today.  I would like to see her BP better controlled and her LFTs trending down before discharge.  She was agreeable to this plan.  Continue routine care today.    Allyssa Nicole MD, FACOG      "

## 2020-07-14 LAB
ALT SERPL W P-5'-P-CCNC: 141 U/L (ref 0–50)
AST SERPL W P-5'-P-CCNC: 81 U/L (ref 0–45)
CREAT SERPL-MCNC: 0.73 MG/DL (ref 0.52–1.04)
GFR SERPL CREATININE-BSD FRML MDRD: >90 ML/MIN/{1.73_M2}

## 2020-07-14 PROCEDURE — 25000132 ZZH RX MED GY IP 250 OP 250 PS 637: Performed by: STUDENT IN AN ORGANIZED HEALTH CARE EDUCATION/TRAINING PROGRAM

## 2020-07-14 PROCEDURE — 36415 COLL VENOUS BLD VENIPUNCTURE: CPT | Performed by: STUDENT IN AN ORGANIZED HEALTH CARE EDUCATION/TRAINING PROGRAM

## 2020-07-14 PROCEDURE — 84460 ALANINE AMINO (ALT) (SGPT): CPT | Performed by: STUDENT IN AN ORGANIZED HEALTH CARE EDUCATION/TRAINING PROGRAM

## 2020-07-14 PROCEDURE — 12000001 ZZH R&B MED SURG/OB UMMC

## 2020-07-14 PROCEDURE — 82565 ASSAY OF CREATININE: CPT | Performed by: STUDENT IN AN ORGANIZED HEALTH CARE EDUCATION/TRAINING PROGRAM

## 2020-07-14 PROCEDURE — 84450 TRANSFERASE (AST) (SGOT): CPT | Performed by: STUDENT IN AN ORGANIZED HEALTH CARE EDUCATION/TRAINING PROGRAM

## 2020-07-14 RX ORDER — HYDROCHLOROTHIAZIDE 25 MG/1
25 TABLET ORAL EVERY 24 HOURS
Status: DISCONTINUED | OUTPATIENT
Start: 2020-07-14 | End: 2020-07-15 | Stop reason: HOSPADM

## 2020-07-14 RX ORDER — NIFEDIPINE 30 MG/1
90 TABLET, EXTENDED RELEASE ORAL DAILY
Status: DISCONTINUED | OUTPATIENT
Start: 2020-07-14 | End: 2020-07-15 | Stop reason: HOSPADM

## 2020-07-14 RX ORDER — IBUPROFEN 600 MG/1
600 TABLET, FILM COATED ORAL EVERY 6 HOURS PRN
Qty: 60 TABLET | Refills: 0 | Status: SHIPPED | OUTPATIENT
Start: 2020-07-14 | End: 2020-08-25

## 2020-07-14 RX ADMIN — IBUPROFEN 800 MG: 800 TABLET ORAL at 19:56

## 2020-07-14 RX ADMIN — NIFEDIPINE 90 MG: 30 TABLET, FILM COATED, EXTENDED RELEASE ORAL at 06:31

## 2020-07-14 RX ADMIN — VALACYCLOVIR HYDROCHLORIDE 500 MG: 500 TABLET, FILM COATED ORAL at 08:54

## 2020-07-14 RX ADMIN — HYDROCHLOROTHIAZIDE 25 MG: 25 TABLET ORAL at 14:08

## 2020-07-14 RX ADMIN — VALACYCLOVIR HYDROCHLORIDE 500 MG: 500 TABLET, FILM COATED ORAL at 23:00

## 2020-07-14 ASSESSMENT — MIFFLIN-ST. JEOR: SCORE: 1561.41

## 2020-07-14 NOTE — PLAN OF CARE
Bps 120-140s/90-100s. Denies pre-e symptoms.  Brisk reflexes, 1 beat clonus.  MDs updated and would like patient to stay additional day for monitoring given elevated BP in AM.  MDs ordered hydrochlorothiazide dose for this afternoon.  Voiding good amounts.  Sad she can't go but understand reason to stay additional day for monitoring.

## 2020-07-14 NOTE — PROVIDER NOTIFICATION
07/14/20 0850 07/14/20 1200   Vitals   BP (!) 147/109 (!) 123/95   Temp 98.3  F (36.8  C) 98.2  F (36.8  C)   Temp src Oral Oral   Pulse 97 103   Heart Rate 97 103   Resp 16 16   Updated MD on Bps and asked about discharge plan

## 2020-07-14 NOTE — PROGRESS NOTES
Rach Peterson  : 1990  MRN: [id number]    Postpartum Progress Note    S: Patient doing okay. She is quite anxious about her blood pressure and that her Procardia was increased again. She is eager to be able to go home. No headache. Voiding large volumes. Pain is minimal and bleeding light.     O:   Patient Vitals for the past 24 hrs:   BP Temp Temp src Pulse Resp Weight   20 0557 (!) 151/103 98.2  F (36.8  C) Oral 73 14 75.1 kg (165 lb 9.6 oz)   20 0147 (!) 147/98 98.1  F (36.7  C) Oral 74 14 --   20 2100 (!) 144/99 -- -- 81 -- --   20 1700 (!) 151/96 98.1  F (36.7  C) Oral 84 17 --   20 1321 (!) 148/99 -- -- 90 16 --   20 0926 (!) 156/99 -- -- 72 16 --   20 0856 (!) 157/114 -- -- -- -- --     Gen: appears well, NAD  Resp: breathing comfortably on room air  CV: regular rate, well perfused  Abdomen: soft, nontender  Ext: warm, well perfused, no edema    I/O  UOP 5750 ml yesterday, 1000 ml since midnight    AST   Date Value Ref Range Status   2020 81 (H) 0 - 45 U/L Final   2020 115 (H) 0 - 45 U/L Final   2020 56 (H) 0 - 45 U/L Final   2020 96 (H) 0 - 45 U/L Final     ALT   Date Value Ref Range Status   2020 141 (H) 0 - 50 U/L Final   2020 143 (H) 0 - 50 U/L Final   2020 84 (H) 0 - 50 U/L Final   2020 112 (H) 0 - 50 U/L Final     Creatinine   Date Value Ref Range Status   2020 0.73 0.52 - 1.04 mg/dL Final   2020 0.72 0.52 - 1.04 mg/dL Final   2020 0.92 0.52 - 1.04 mg/dL Final   2020 0.90 0.52 - 1.04 mg/dL Final         A/P: Rach Peterson is a  female PPD#3 s/p . Doing well.     # Pre-eclampsia with severe features  - Liver enzymes increased yesterday, repeat shows mild improvement. Blood pressure high mild range on current dose, increased again this AM  - HELLP labs notable for ALT 91>112>84>143>141, AST 62>96>56>115>81. Repeat labs as noted.  - Treated with IV labetalol  20, 40 upon admission, then 20 x2 postpartum, last at (0414, 7/11). IV antihypertensives PRN for BP >160/110  - S/p 24 hours Mag  - 1D Procardia XL 30 > 1D 60 mg, s/p PO lasix x1 > D#1 90 mg     # Postpartum   - Continue routine postpartum care  - Pain: well controlled with current regimen  - ABLA: Hgb 11.7 prior to delivery, . PPD#1 Hgb 11  - GI: stool softeners and antiemetics PRN  - : Voiding. Bleeding light  - Feeding: bottlefeeding  - Contraception: condoms    Dispo: DC pending stability of blood pressure and improvement in LFTs.     Azalea Kennedy MD, MPH  OBGYN PGY-3  7/14/2020 8:08 AM     Appreciate Dr. Kennedy's note above, patient also seen and examined by me. I agree with the note as edited above.  Follow BP this morning, for possible discharge later today.  Katelynn Rodas MD

## 2020-07-14 NOTE — PROGRESS NOTES
CNM round:  Pt and  very happy with full team care. Pt feeling well and hopeful about discharge today. Happy to hear mild improvement with LFTs.  Feeling well up and walking, bottle feeding  without difficulty, bleeding min. Knows she can see CNM team postpartum as well.  Ni Boyle, APRN CNM

## 2020-07-15 VITALS
TEMPERATURE: 98 F | SYSTOLIC BLOOD PRESSURE: 144 MMHG | BODY MASS INDEX: 23.54 KG/M2 | WEIGHT: 164.4 LBS | HEART RATE: 99 BPM | OXYGEN SATURATION: 100 % | HEIGHT: 70 IN | RESPIRATION RATE: 18 BRPM | DIASTOLIC BLOOD PRESSURE: 99 MMHG

## 2020-07-15 LAB
ALT SERPL W P-5'-P-CCNC: 126 U/L (ref 0–50)
AST SERPL W P-5'-P-CCNC: 56 U/L (ref 0–45)

## 2020-07-15 PROCEDURE — 25000132 ZZH RX MED GY IP 250 OP 250 PS 637: Performed by: STUDENT IN AN ORGANIZED HEALTH CARE EDUCATION/TRAINING PROGRAM

## 2020-07-15 PROCEDURE — 84450 TRANSFERASE (AST) (SGOT): CPT | Performed by: STUDENT IN AN ORGANIZED HEALTH CARE EDUCATION/TRAINING PROGRAM

## 2020-07-15 PROCEDURE — 84460 ALANINE AMINO (ALT) (SGPT): CPT | Performed by: STUDENT IN AN ORGANIZED HEALTH CARE EDUCATION/TRAINING PROGRAM

## 2020-07-15 PROCEDURE — 36415 COLL VENOUS BLD VENIPUNCTURE: CPT | Performed by: STUDENT IN AN ORGANIZED HEALTH CARE EDUCATION/TRAINING PROGRAM

## 2020-07-15 RX ORDER — NIFEDIPINE 90 MG/1
90 TABLET, FILM COATED, EXTENDED RELEASE ORAL DAILY
Qty: 45 TABLET | Refills: 0 | Status: SHIPPED | OUTPATIENT
Start: 2020-07-15 | End: 2020-07-15

## 2020-07-15 RX ORDER — NIFEDIPINE 90 MG/1
90 TABLET, FILM COATED, EXTENDED RELEASE ORAL DAILY
Qty: 45 TABLET | Refills: 0 | Status: SHIPPED | OUTPATIENT
Start: 2020-07-15 | End: 2020-08-25

## 2020-07-15 RX ORDER — HYDROCHLOROTHIAZIDE 25 MG/1
25 TABLET ORAL EVERY 24 HOURS
Qty: 45 TABLET | Refills: 0 | Status: SHIPPED | OUTPATIENT
Start: 2020-07-15 | End: 2020-07-15

## 2020-07-15 RX ORDER — HYDROCHLOROTHIAZIDE 25 MG/1
25 TABLET ORAL EVERY 24 HOURS
Qty: 45 TABLET | Refills: 0 | Status: SHIPPED | OUTPATIENT
Start: 2020-07-15 | End: 2020-10-06

## 2020-07-15 RX ADMIN — NIFEDIPINE 90 MG: 30 TABLET, FILM COATED, EXTENDED RELEASE ORAL at 08:11

## 2020-07-15 RX ADMIN — VALACYCLOVIR HYDROCHLORIDE 500 MG: 500 TABLET, FILM COATED ORAL at 08:11

## 2020-07-15 RX ADMIN — IBUPROFEN 800 MG: 800 TABLET ORAL at 03:56

## 2020-07-15 ASSESSMENT — MIFFLIN-ST. JEOR: SCORE: 1555.96

## 2020-07-15 NOTE — PLAN OF CARE
Pt vital signs and assessment findings normal. BP slightly elevated on second check at 123/93. Denies pre-e symptoms. Reflexes normal at +2/3. 1 beat clonus present bilaterally. Mild dependent edema. Pt up ad enrique. Voiding and passing gas. Tolerating regular diet and fluids. Supported by spouse at bedside. Bonding well with  and attentive to her cares.

## 2020-07-15 NOTE — DISCHARGE INSTRUCTIONS
Preeclampsia   Call your doctor right away if you have any of the following:  - Edema (swelling) in your face or hands  - Rapid weight gain-about 1 pound or more in a day  - Headache  - Abdominal pain on your right side  - Vision problems (flashes or spots)  - You have questions or concerns once you return home.  -           Please call clinic if BP is higher than 160/105.  Please make BP check appt in 1 week from discharge date.   [phone]    Postpartum Vaginal Delivery Instructions    Activity       Ask family and friends for help when you need it.    Do not place anything in your vagina for 6 weeks.    You are not restricted on other activities, but take it easy for a few weeks to allow your body to recover from delivery.  You are able to do any activities you feel up to that point.    No driving until you have stopped taking your pain medications (usually two weeks after delivery).     Call your health care provider if you have any of these symptoms:       Increased pain, swelling, redness, or fluid around your stiches from an episiotomy or perineal tear.    A fever above 100.4 F (38 C) with or without chills when placing a thermometer under your tongue.    You soak a sanitary pad with blood within 1 hour, or you see blood clots larger than a golf ball.    Bleeding that lasts more than 6 weeks.    Vaginal discharge that smells bad.    Severe pain, cramping or tenderness in your lower belly area.    A need to urinate more frequently (use the toilet more often), more urgently (use the toilet very quickly), or it burns when you urinate.    Nausea and vomiting.    Redness, swelling or pain around a vein in your leg.    Problems breastfeeding or a red or painful area on your breast.    Chest pain and cough or are gasping for air.    Problems coping with sadness, anxiety, or depression.  If you have any concerns about hurting yourself or the baby, call your provider immediately.     You have questions or concerns  after you return home.     Keep your hands clean:  Always wash your hands before touching your perineal area and stitches.  This helps reduce your risk of infection.  If your hands aren't dirty, you may use an alcohol hand-rub to clean your hands. Keep your nails clean and short.

## 2020-07-15 NOTE — PLAN OF CARE
Data: Vital signs within normal limits except for elevated BP's (130's/'s) and slightly elevated pulse. Denies headache, vision changes, shortness of breath, and/or epigastric pain. Postpartum checks within normal limits - see flow record. Patient eating and drinking normally. Patient able to empty bladder independently and is up ambulating. Strict intake and output monitoring. No apparent signs of infection. Patient performing self cares and is able to care for infant.  Action: Pain has been adequately managed with Ibuprofen.   Response: Positive attachment behaviors observed with infant, infant is discharged but rooming in. Support person, FOB, present.   Plan: Continue with the plan of care.

## 2020-07-15 NOTE — PLAN OF CARE
Data: Vital signs within normal limits. Postpartum checks within normal limits - see flow record. Patient eating and drinking normally. Patient able to empty bladder independently and is up ambulating. No apparent signs of infection.  Perineum  healing well. Patient performing self cares and is able to care for infant.  Action: Patient medicated during the shift for pain. See MAR. Patient reassessed within 1 hour after each medication and pain was improved - patient stated she was comfortable. Patient education done about discharge instructions, follow up and pre e warning signs. See flow record.  Response: Positive attachment behaviors observed with infant. Support persons  present.   Plan: Anticipate discharge today.

## 2020-07-15 NOTE — PROGRESS NOTES
Rach Peterson  : 1990  MRN: [id number]    Postpartum Progress Note    S: Patient doing much better this morning. Is hopeful that her blood pressure has been better on her current regimen. She continues to deny headache, vision changes, SOB, or CP. Bleeding light. Infant doing well, bottlefeeding.     O:   Patient Vitals for the past 24 hrs:   BP Temp Temp src Pulse Heart Rate Resp Weight   07/15/20 0641 -- -- -- -- -- -- 74.6 kg (164 lb 6.4 oz)   07/15/20 0400 (!) 123/93 98.2  F (36.8  C) Oral -- 82 16 --   07/15/20 0005 128/85 97.9  F (36.6  C) Oral -- 85 16 --   20 1957 (!) 136/100 97.7  F (36.5  C) Oral 106 -- 18 --   20 1551 (!) 134/96 98  F (36.7  C) Oral 103 -- 16 --   20 1200 (!) 123/95 98.2  F (36.8  C) Oral 103 103 16 --   20 0850 (!) 147/109 98.3  F (36.8  C) Oral 97 97 16 --     Gen: appears well, NAD  Resp: breathing comfortably on room air  CV: regular rate, well perfused  Abdomen: soft, nontender  Ext: warm, well perfused, no edema    I/O  UOP 5750 ml yesterday, 1000 ml since midnight    AST   Date Value Ref Range Status   2020 81 (H) 0 - 45 U/L Final   2020 115 (H) 0 - 45 U/L Final   2020 56 (H) 0 - 45 U/L Final   2020 96 (H) 0 - 45 U/L Final     ALT   Date Value Ref Range Status   2020 141 (H) 0 - 50 U/L Final   2020 143 (H) 0 - 50 U/L Final   2020 84 (H) 0 - 50 U/L Final   2020 112 (H) 0 - 50 U/L Final     A/P: Rach Peterson is a  female PPD#4 s/p . Doing well.     # Pre-eclampsia with severe features  - Liver enzymes increased yesterday, repeat shows mild improvement. Blood pressure high mild range on current dose, increased again this AM  - HELLP labs notable for ALT 91>112>84>143>141, AST 62>96>56>115>81. Downtrending yesterday, repeat pending this morning.   - Treated with IV labetalol 20, 40 upon admission, then 20 x2 postpartum, last at (0414, ). IV antihypertensives PRN for BP  >160/110  - S/p 24 hours Mag  - 1D Procardia XL 30 > 1D 60 mg, s/p PO lasix x1 > D#2 90 mg, D#2 hydrochlorothiazide 25 mg     # Postpartum   - Continue routine postpartum care  - Pain: well controlled with current regimen  - ABLA: Hgb 11.7 prior to delivery, . PPD#1 Hgb 11  - GI: stool softeners and antiemetics PRN  - : Voiding. Bleeding light  - Feeding: bottlefeeding  - Contraception: condoms    Dispo: DC likely today. Pending continued stability of blood pressure and downtrending LFTs.    Azalea Kennedy MD, MPH  OBGYN PGY-3  7/15/2020 6:53 AM     [facility] staff:  Appreciate note by Dr. Kennedy.  I have seen and examined the patient without the resident. I have reviewed, edited, and agree with the note.        Jessica Mendoza MD, FACOG  7/15/2020  7:48 AM

## 2020-07-24 ENCOUNTER — ALLIED HEALTH/NURSE VISIT (OUTPATIENT)
Dept: OBGYN | Facility: CLINIC | Age: 30
End: 2020-07-24
Payer: COMMERCIAL

## 2020-07-24 VITALS — DIASTOLIC BLOOD PRESSURE: 83 MMHG | HEART RATE: 93 BPM | SYSTOLIC BLOOD PRESSURE: 114 MMHG

## 2020-07-24 DIAGNOSIS — I10 BENIGN ESSENTIAL HYPERTENSION: Primary | ICD-10-CM

## 2020-07-24 PROCEDURE — 40000269 ZZH STATISTIC NO CHARGE FACILITY FEE: Mod: ZF

## 2020-08-11 ASSESSMENT — ANXIETY QUESTIONNAIRES
7. FEELING AFRAID AS IF SOMETHING AWFUL MIGHT HAPPEN: SEVERAL DAYS
GAD7 TOTAL SCORE: 5
6. BECOMING EASILY ANNOYED OR IRRITABLE: MORE THAN HALF THE DAYS
2. NOT BEING ABLE TO STOP OR CONTROL WORRYING: NOT AT ALL
GAD7 TOTAL SCORE: 5
7. FEELING AFRAID AS IF SOMETHING AWFUL MIGHT HAPPEN: SEVERAL DAYS
3. WORRYING TOO MUCH ABOUT DIFFERENT THINGS: SEVERAL DAYS
1. FEELING NERVOUS, ANXIOUS, OR ON EDGE: NOT AT ALL
4. TROUBLE RELAXING: SEVERAL DAYS
5. BEING SO RESTLESS THAT IT IS HARD TO SIT STILL: NOT AT ALL

## 2020-08-12 ENCOUNTER — MEDICAL CORRESPONDENCE (OUTPATIENT)
Dept: HEALTH INFORMATION MANAGEMENT | Facility: CLINIC | Age: 30
End: 2020-08-12

## 2020-08-25 ENCOUNTER — OFFICE VISIT (OUTPATIENT)
Dept: OBGYN | Facility: CLINIC | Age: 30
End: 2020-08-25
Attending: ADVANCED PRACTICE MIDWIFE
Payer: COMMERCIAL

## 2020-08-25 DIAGNOSIS — B00.9 HERPES SIMPLEX TYPE 2 INFECTION: ICD-10-CM

## 2020-08-25 DIAGNOSIS — R03.0 ELEVATED BP WITHOUT DIAGNOSIS OF HYPERTENSION: ICD-10-CM

## 2020-08-25 PROCEDURE — G0145 SCR C/V CYTO,THINLAYER,RESCR: HCPCS | Performed by: ADVANCED PRACTICE MIDWIFE

## 2020-08-25 PROCEDURE — G0463 HOSPITAL OUTPT CLINIC VISIT: HCPCS | Mod: ZF

## 2020-08-25 RX ORDER — VALACYCLOVIR HYDROCHLORIDE 500 MG/1
500 TABLET, FILM COATED ORAL DAILY
Qty: 90 TABLET | Refills: 3 | Status: SHIPPED | OUTPATIENT
Start: 2020-08-25 | End: 2021-02-24

## 2020-08-25 RX ORDER — NIFEDIPINE 90 MG/1
90 TABLET, FILM COATED, EXTENDED RELEASE ORAL DAILY
Qty: 45 TABLET | Refills: 0 | Status: SHIPPED | OUTPATIENT
Start: 2020-08-25 | End: 2020-09-08

## 2020-08-25 ASSESSMENT — ANXIETY QUESTIONNAIRES
2. NOT BEING ABLE TO STOP OR CONTROL WORRYING: NOT AT ALL
7. FEELING AFRAID AS IF SOMETHING AWFUL MIGHT HAPPEN: NOT AT ALL
6. BECOMING EASILY ANNOYED OR IRRITABLE: SEVERAL DAYS
GAD7 TOTAL SCORE: 4
1. FEELING NERVOUS, ANXIOUS, OR ON EDGE: MORE THAN HALF THE DAYS
3. WORRYING TOO MUCH ABOUT DIFFERENT THINGS: SEVERAL DAYS
5. BEING SO RESTLESS THAT IT IS HARD TO SIT STILL: NOT AT ALL

## 2020-08-25 ASSESSMENT — PATIENT HEALTH QUESTIONNAIRE - PHQ9
5. POOR APPETITE OR OVEREATING: NOT AT ALL
SUM OF ALL RESPONSES TO PHQ QUESTIONS 1-9: 3

## 2020-08-25 NOTE — LETTER
2020       RE: Rach Peterson   [address]  [place] [address]     Dear Colleague,    Thank you for referring your patient, Rach Peterson, to the [facility] at [facility]. Please see a copy of my visit note below.    Nursing Notes:   Claudine Lock LPN  2020  9:06 AM  Signed  Chief Complaint   Patient presents with     Post Partum Exam     DD 2020       SUBJECTIVE:   Rach Peterson is here for her 6-week postpartum checkup.     PHQ-9 score:   Hx of Abuse:  No    Delivery Date: 2020.    Delivering provider:  Mamadou Horner MD.    Type of delivery:  .  Perineum:  tear, with repair.     Delivery complications: None  Infant gender:  girl, weight 8 pounds 0 oz.  Feeding Method:  Bottlefed.  Complications reported with feeding:  none, infant thriving .    Bleeding:  None.  Duration:  .  Menses resumed:  No  Bowel/Urinary problems:  No    Contraception Planned:  Condoms  She  has not had intercourse since delivery..        Claudine Lock LPN     ================================================================  ROS: 10 point ROS neg other than the symptoms noted above in the HPI.       EXAM:  LMP 10/04/2019     General: healthy, alert and no distress  Psych: negative for anxiety, nervous breakdown, depression, thoughts of self-harm, thoughts of hurting someone else and marital problems  Last PHQ-9 score on record= No Value exists for the : HP#PHQ9  Breasts:  Nipples intact with no lesions and Non-tender  Abdomen: Benign, Soft, flat, non-tender, No masses, organomegaly and Diastasis less than 1-2 FB  Incision:  None   Vulva:  Normal genitalia and Bartholin's, Urethra, [place]'s normal  Vagina:  normal with good muscle tone and epis/repair well healed  Cervix:  no lesions, pink, moist, closed, without lesion or CMT and pap taken.    Uterus:  fully involuted and non-tender    Adnexa:  Within normal limits and  No masses, nodularity, tenderness  Recto-vaginal:   anus normal      ASSESSMENT:   Encounter Diagnoses   Name Primary?     Herpes simplex type 2 infection      Postpartum exam Yes     Elevated BP without diagnosis of hypertension       Normal postpartum exam after   Pregnancy was complicated by:  Preeclampsia.      PLAN:  Orders Placed This Encounter   Procedures     Obtaining, preparing and conveyance of cervical or vaginal smear to laboratory.     Pap imaged thin layer screen reflex to HPV if ASCUS - recommended age 25 - 29 years      Orders Placed This Encounter   Medications     valACYclovir (VALTREX) 500 MG tablet     Sig: Take 1 tablet (500 mg) by mouth daily     Dispense:  90 tablet     Refill:  3     NIFEdipine ER (ADALAT CC) 90 MG 24 hr tablet     Sig: Take 1 tablet (90 mg) by mouth daily     Dispense:  45 tablet     Refill:  0        Risks and benefits of prescribed medications discussed.  Medication instructions reviewed.  Discussed calcium intake, vitamins and supplements including Vitamin D  Exercise encouraged  Follow up in about 2 weeks with Prema for HTN  Need med plan for the next 2 weeks, will alert patient through [employer].  Reorder nifedipine for now, stop hydrodiuril    Answers for HPI/ROS submitted by the patient on 2020   MYNOR 7 TOTAL SCORE: 5  Ni Boyle, DNP, APRN, CNM, FACNM

## 2020-08-25 NOTE — NURSING NOTE
Chief Complaint   Patient presents with     Post Partum Exam     DD 2020       SUBJECTIVE:   Rach Peterson is here for her 6-week postpartum checkup.     PHQ-9 score:   Hx of Abuse:  No    Delivery Date: 2020.    Delivering provider:  Mamadou Horner MD.    Type of delivery:  .  Perineum:  tear, with repair.     Delivery complications: None  Infant gender:  girl, weight 8 pounds 0 oz.  Feeding Method:  Bottlefed.  Complications reported with feeding:  none, infant thriving .    Bleeding:  None.  Duration:  .  Menses resumed:  No  Bowel/Urinary problems:  No    Contraception Planned:  None -- is she planning pregnancy? No  She  has not had intercourse since delivery..        Claudine Lock LPN

## 2020-08-25 NOTE — PROGRESS NOTES
Nursing Notes:   Claudine Lock LPN  2020  9:06 AM  Signed  Chief Complaint   Patient presents with     Post Partum Exam     DD 2020       SUBJECTIVE:   Rach Peterson is here for her 6-week postpartum checkup.     PHQ-9 score:   Hx of Abuse:  No    Delivery Date: 2020.    Delivering provider:  Mmaadou Horner MD.    Type of delivery:  .  Perineum:  tear, with repair.     Delivery complications: None  Infant gender:  girl, weight 8 pounds 0 oz.  Feeding Method:  Bottlefed.  Complications reported with feeding:  none, infant thriving .    Bleeding:  None.  Duration:  .  Menses resumed:  No  Bowel/Urinary problems:  No    Contraception Planned:  Condoms  She  has not had intercourse since delivery..        Claudine Lock LPN     ================================================================  ROS: 10 point ROS neg other than the symptoms noted above in the HPI.       EXAM:  LMP 10/04/2019     General: healthy, alert and no distress  Psych: negative for anxiety, nervous breakdown, depression, thoughts of self-harm, thoughts of hurting someone else and marital problems  Last PHQ-9 score on record= No Value exists for the : HP#PHQ9  Breasts:  Nipples intact with no lesions and Non-tender  Abdomen: Benign, Soft, flat, non-tender, No masses, organomegaly and Diastasis less than 1-2 FB  Incision:  None   Vulva:  Normal genitalia and Bartholin's, Urethra, [place]'s normal  Vagina:  normal with good muscle tone and epis/repair well healed  Cervix:  no lesions, pink, moist, closed, without lesion or CMT and pap taken.    Uterus:  fully involuted and non-tender    Adnexa:  Within normal limits and No masses, nodularity, tenderness  Recto-vaginal:   anus normal      ASSESSMENT:   Encounter Diagnoses   Name Primary?     Herpes simplex type 2 infection      Postpartum exam Yes     Elevated BP without diagnosis of hypertension       Normal postpartum exam after   Pregnancy was complicated by:   Preeclampsia.      PLAN:  Orders Placed This Encounter   Procedures     Obtaining, preparing and conveyance of cervical or vaginal smear to laboratory.     Pap imaged thin layer screen reflex to HPV if ASCUS - recommended age 25 - 29 years      Orders Placed This Encounter   Medications     valACYclovir (VALTREX) 500 MG tablet     Sig: Take 1 tablet (500 mg) by mouth daily     Dispense:  90 tablet     Refill:  3     NIFEdipine ER (ADALAT CC) 90 MG 24 hr tablet     Sig: Take 1 tablet (90 mg) by mouth daily     Dispense:  45 tablet     Refill:  0        Risks and benefits of prescribed medications discussed.  Medication instructions reviewed.  Discussed calcium intake, vitamins and supplements including Vitamin D  Exercise encouraged  Follow up in about 2 weeks with Prema for HTN  Need med plan for the next 2 weeks, will alert patient through [employer].  Reorder nifedipine for now, stop hydrodiuril    Answers for HPI/ROS submitted by the patient on 8/11/2020   MYNOR 7 TOTAL SCORE: 5  Ni Boyle, DNP, APRN, CNM, FACNM

## 2020-08-31 LAB
COPATH REPORT: NORMAL
PAP: NORMAL

## 2020-08-31 ASSESSMENT — ANXIETY QUESTIONNAIRES
2. NOT BEING ABLE TO STOP OR CONTROL WORRYING: SEVERAL DAYS
7. FEELING AFRAID AS IF SOMETHING AWFUL MIGHT HAPPEN: NOT AT ALL
5. BEING SO RESTLESS THAT IT IS HARD TO SIT STILL: NOT AT ALL
4. TROUBLE RELAXING: NOT AT ALL
GAD7 TOTAL SCORE: 3
GAD7 TOTAL SCORE: 3
3. WORRYING TOO MUCH ABOUT DIFFERENT THINGS: NOT AT ALL
1. FEELING NERVOUS, ANXIOUS, OR ON EDGE: SEVERAL DAYS
7. FEELING AFRAID AS IF SOMETHING AWFUL MIGHT HAPPEN: NOT AT ALL
6. BECOMING EASILY ANNOYED OR IRRITABLE: SEVERAL DAYS

## 2020-08-31 ASSESSMENT — ENCOUNTER SYMPTOMS
LEG PAIN: 0
NAIL CHANGES: 0
HYPOTENSION: 0
SKIN CHANGES: 0
PALPITATIONS: 0
SYNCOPE: 0
SLEEP DISTURBANCES DUE TO BREATHING: 0
HYPERTENSION: 1
POOR WOUND HEALING: 0
LIGHT-HEADEDNESS: 0
EXERCISE INTOLERANCE: 0
ORTHOPNEA: 0

## 2020-09-08 ENCOUNTER — OFFICE VISIT (OUTPATIENT)
Dept: INTERNAL MEDICINE | Facility: CLINIC | Age: 30
End: 2020-09-08
Attending: INTERNAL MEDICINE
Payer: COMMERCIAL

## 2020-09-08 VITALS
HEART RATE: 80 BPM | DIASTOLIC BLOOD PRESSURE: 74 MMHG | SYSTOLIC BLOOD PRESSURE: 106 MMHG | WEIGHT: 157 LBS | BODY MASS INDEX: 22.53 KG/M2

## 2020-09-08 DIAGNOSIS — I10 ESSENTIAL HYPERTENSION: Primary | ICD-10-CM

## 2020-09-08 LAB
ALBUMIN SERPL-MCNC: 4.3 G/DL (ref 3.4–5)
ALP SERPL-CCNC: 87 U/L (ref 40–150)
ALT SERPL W P-5'-P-CCNC: 44 U/L (ref 0–50)
ANION GAP SERPL CALCULATED.3IONS-SCNC: 8 MMOL/L (ref 3–14)
AST SERPL W P-5'-P-CCNC: 29 U/L (ref 0–45)
BILIRUB SERPL-MCNC: 0.6 MG/DL (ref 0.2–1.3)
BUN SERPL-MCNC: 13 MG/DL (ref 7–30)
CALCIUM SERPL-MCNC: 9.4 MG/DL (ref 8.5–10.1)
CHLORIDE SERPL-SCNC: 107 MMOL/L (ref 94–109)
CO2 SERPL-SCNC: 25 MMOL/L (ref 20–32)
CREAT SERPL-MCNC: 0.91 MG/DL (ref 0.52–1.04)
GFR SERPL CREATININE-BSD FRML MDRD: 86 ML/MIN/{1.73_M2}
GLUCOSE SERPL-MCNC: 95 MG/DL (ref 70–99)
POTASSIUM SERPL-SCNC: 4.2 MMOL/L (ref 3.4–5.3)
PROT SERPL-MCNC: 7.9 G/DL (ref 6.8–8.8)
SODIUM SERPL-SCNC: 140 MMOL/L (ref 133–144)

## 2020-09-08 PROCEDURE — 80053 COMPREHEN METABOLIC PANEL: CPT | Performed by: INTERNAL MEDICINE

## 2020-09-08 PROCEDURE — 36415 COLL VENOUS BLD VENIPUNCTURE: CPT | Performed by: INTERNAL MEDICINE

## 2020-09-08 RX ORDER — NIFEDIPINE 30 MG
60 TABLET, EXTENDED RELEASE ORAL DAILY
Qty: 60 TABLET | Refills: 3 | Status: SHIPPED | OUTPATIENT
Start: 2020-09-08 | End: 2020-11-13

## 2020-09-08 ASSESSMENT — ENCOUNTER SYMPTOMS
TINGLING: 0
NAIL CHANGES: 0
NECK PAIN: 0
EYE IRRITATION: 0
COUGH DISTURBING SLEEP: 0
HOT FLASHES: 0
FATIGUE: 0
PANIC: 0
MUSCLE CRAMPS: 0
FLANK PAIN: 0
HEARTBURN: 0
EYE PAIN: 0
SORE THROAT: 0
SKIN CHANGES: 0
FEVER: 0
JOINT SWELLING: 0
PARALYSIS: 0
TREMORS: 0
RECTAL BLEEDING: 0
BRUISES/BLEEDS EASILY: 0
DYSPNEA ON EXERTION: 0
BREAST PAIN: 0
WEIGHT LOSS: 0
BOWEL INCONTINENCE: 0
BACK PAIN: 0
HOARSE VOICE: 0
POLYPHAGIA: 0
SYNCOPE: 0
NAUSEA: 0
NUMBNESS: 0
PALPITATIONS: 0
MUSCLE WEAKNESS: 0
EYE REDNESS: 0
ALTERED TEMPERATURE REGULATION: 0
POLYDIPSIA: 0
SPEECH CHANGE: 0
DIARRHEA: 0
CHILLS: 0
NECK MASS: 0
SWOLLEN GLANDS: 0
WEIGHT GAIN: 0
SEIZURES: 0
SINUS CONGESTION: 0
HALLUCINATIONS: 0
BLOATING: 0
STIFFNESS: 0
DECREASED APPETITE: 0
SPUTUM PRODUCTION: 0
WHEEZING: 0
POOR WOUND HEALING: 0
COUGH: 0
WEAKNESS: 0
EXTREMITY NUMBNESS: 0
DEPRESSION: 0
TROUBLE SWALLOWING: 0
BREAST MASS: 0
HEMOPTYSIS: 0
HYPERTENSION: 1
NIGHT SWEATS: 0
LOSS OF CONSCIOUSNESS: 0
BLOOD IN STOOL: 0
SLEEP DISTURBANCES DUE TO BREATHING: 0
TASTE DISTURBANCE: 0
CONSTIPATION: 0
VOMITING: 0
INSOMNIA: 0
HEMATURIA: 0
SNORES LOUDLY: 0
HEADACHES: 0
LEG PAIN: 0
DISTURBANCES IN COORDINATION: 0
ARTHRALGIAS: 0
EYE WATERING: 0
ORTHOPNEA: 0
DOUBLE VISION: 0
SINUS PAIN: 0
RECTAL PAIN: 0
DYSURIA: 0
SMELL DISTURBANCE: 0
ABDOMINAL PAIN: 0
MYALGIAS: 0
HYPOTENSION: 0
SHORTNESS OF BREATH: 0
INCREASED ENERGY: 0
DIFFICULTY URINATING: 0
LIGHT-HEADEDNESS: 0
NERVOUS/ANXIOUS: 0
POSTURAL DYSPNEA: 0
EXERCISE INTOLERANCE: 0
DIZZINESS: 0
RESPIRATORY PAIN: 0
MEMORY LOSS: 0
DECREASED LIBIDO: 0
JAUNDICE: 0
DECREASED CONCENTRATION: 0

## 2020-09-08 ASSESSMENT — PATIENT HEALTH QUESTIONNAIRE - PHQ9
SUM OF ALL RESPONSES TO PHQ QUESTIONS 1-9: 2
5. POOR APPETITE OR OVEREATING: NOT AT ALL

## 2020-09-08 ASSESSMENT — ANXIETY QUESTIONNAIRES
6. BECOMING EASILY ANNOYED OR IRRITABLE: SEVERAL DAYS
2. NOT BEING ABLE TO STOP OR CONTROL WORRYING: SEVERAL DAYS
7. FEELING AFRAID AS IF SOMETHING AWFUL MIGHT HAPPEN: NOT AT ALL
1. FEELING NERVOUS, ANXIOUS, OR ON EDGE: MORE THAN HALF THE DAYS
GAD7 TOTAL SCORE: 5
5. BEING SO RESTLESS THAT IT IS HARD TO SIT STILL: NOT AT ALL
3. WORRYING TOO MUCH ABOUT DIFFERENT THINGS: SEVERAL DAYS

## 2020-09-08 ASSESSMENT — PAIN SCALES - GENERAL: PAINLEVEL: NO PAIN (0)

## 2020-09-08 NOTE — LETTER
9/8/2020       RE: Rach Peterson  [address] [place] [address]     Dear Colleague,    Thank you for referring your patient, Rach Peterson, to the [facility]  at [facility]. Please see a copy of my visit note below.    HPI  Patient reports that she was diagnosed with preeclampsia at 40 weeks of her pregnancy. She delivered at 40 weeks. She had to stay at the hospital for several days and was discharged on     Review of Systems     Constitutional:  Negative for fever, chills, weight loss, weight gain, fatigue, decreased appetite, night sweats, recent stressors, height gain, height loss, post-operative complications, incisional pain, hallucinations, increased energy, hyperactivity and confused.   HENT:  Negative for ear pain, hearing loss, tinnitus, nosebleeds, trouble swallowing, hoarse voice, mouth sores, sore throat, ear discharge, tooth pain, gum tenderness, taste disturbance, smell disturbance, hearing aid, bleeding gums, dry mouth, sinus pain, sinus congestion and neck mass.    Eyes:  Negative for double vision, pain, redness, eye pain, decreased vision, eye watering, eye bulging, eye dryness, flashing lights, spots, floaters, strabismus, tunnel vision, jaundice and eye irritation.   Respiratory:   Negative for cough, hemoptysis, sputum production, shortness of breath, wheezing, sleep disturbances due to breathing, snores loudly, respiratory pain, dyspnea on exertion, cough disturbing sleep and postural dyspnea.    Cardiovascular:  Positive for chest pain and hypertension. Negative for dyspnea on exertion, palpitations, orthopnea, fingers/toes turn blue, hypotension, syncope, history of heart murmur, pacemaker, few scattered varicosities, leg pain, sleep disturbances due to breathing, light-headedness, exercise intolerance and edema.   Gastrointestinal:  Negative for heartburn, nausea, vomiting, abdominal pain, diarrhea,  constipation, blood in stool, melena, rectal pain, bloating, hemorrhoids, bowel incontinence, jaundice, rectal bleeding, coffee ground emesis and change in stool.   Genitourinary:  Negative for bladder incontinence, dysuria, urgency, hematuria, flank pain, vaginal discharge, difficulty urinating, genital sores, dyspareunia, decreased libido, nocturia, voiding less frequently, arousal difficulty, abnormal vaginal bleeding, excessive menstruation, menstrual changes, hot flashes, vaginal dryness and postmenopausal bleeding.   Musculoskeletal:  Negative for myalgias, back pain, joint swelling, arthralgias, stiffness, muscle cramps, neck pain, bone pain, muscle weakness and fracture.   Skin:  Positive for hair changes, acne and sensitivity to sunlight. Negative for nail changes, itching, poor wound healing, rash, skin changes, warts, poor wound healing, scarring, flaky skin, Raynaud's phenomenon and skin thickening.   Neurological:  Negative for dizziness, tingling, tremors, speech change, seizures, loss of consciousness, weakness, light-headedness, numbness, headaches, disturbances in coordination, extremity numbness, memory loss, difficulty walking and paralysis.   Endo/Heme:  Negative for anemia, swollen glands and bruises/bleeds easily.   Psychiatric/Behavioral:  Negative for depression, hallucinations, memory loss, decreased concentration, mood swings and panic attacks.    Breast:  Negative for breast discharge, breast mass, breast pain and nipple retraction.   Endocrine:  Negative for altered temperature regulation, polyphagia, polydipsia, unwanted hair growth and change in facial hair.    Current Outpatient Medications   Medication     hydrochlorothiazide (HYDRODIURIL) 25 MG tablet     valACYclovir (VALTREX) 500 MG tablet     No current facility-administered medications for this visit.      Past Medical History:   Diagnosis Date     Anxiety     as adult, therapy, no meds or hosp     Herpes simplex without mention  of complication 12/1/2011     Knee pain, right      Syncope and collapse 2006    Cardiology workup with Dr Brady, wandering atrial pacer, no activity restritction and no need for abx prophylaxis (mild pulm flow murmur)     Past Surgical History:   Procedure Laterality Date     DILATION AND EVACUATION N/A 7/3/2019    Procedure: Dilation And Evacuation;  Surgeon: Josefa Kwan MD;  Location: [facility]     HC REMOVE TONSILS/ADENOIDS,<11 Y/O      T & A <13y.o.     Family History   Problem Relation Age of Onset     Hypertension Mother      Cancer Maternal Aunt         Melanoma     Social History     Socioeconomic History     Marital status:      Spouse name: Russell     Number of children: Not on file     Years of education: Not on file     Highest education level: Not on file   Occupational History     Occupation:      Employer: [employer]     Comment: fulltime   Social Needs     Financial resource strain: Not on file     Food insecurity     Worry: Not on file     Inability: Not on file     Transportation needs     Medical: Not on file     Non-medical: Not on file   Tobacco Use     Smoking status: Never Smoker     Smokeless tobacco: Never Used     Tobacco comment: Non smoking home   Substance and Sexual Activity     Alcohol use: Not Currently     Comment: four drinks once every two weeks.     Drug use: No     Sexual activity: Yes     Partners: Male     Birth control/protection: None   Lifestyle     Physical activity     Days per week: Not on file     Minutes per session: Not on file     Stress: Not on file   Relationships     Social connections     Talks on phone: Not on file     Gets together: Not on file     Attends [employer] service: Not on file     Active member of club or organization: Not on file     Attends meetings of clubs or organizations: Not on file     Relationship status: Not on file     Intimate partner violence     Fear of current or ex partner: Not on file      Emotionally abused: Not on file     Physically abused: Not on file     Forced sexual activity: Not on file   Other Topics Concern     Parent/sibling w/ CABG, MI or angioplasty before 65F 55M? Not Asked   Social History Narrative     Not on file         Physical Exam  Vitals signs and nursing note reviewed.   Constitutional:       Appearance: Normal appearance.   HENT:      Head: Normocephalic and atraumatic.      Mouth/Throat:      Mouth: Mucous membranes are moist.   Eyes:      Pupils: Pupils are equal, round, and reactive to light.   Neck:      Musculoskeletal: Normal range of motion.   Cardiovascular:      Rate and Rhythm: Normal rate.   Pulmonary:      Effort: Pulmonary effort is normal.   Abdominal:      General: Abdomen is flat.   Musculoskeletal:         General: No edema.   Skin:     General: Skin is warm and dry.   Neurological:      General: No focal deficit present.      Mental Status: She is alert and oriented to person, place, and time. Mental status is at baseline.   Psychiatric:         Mood and Affect: Mood normal.         Behavior: Behavior normal.         Thought Content: Thought content normal.         Judgment: Judgment normal.           Answers for HPI/ROS submitted by the patient on 8/31/2020   MYNOR 7 TOTAL SCORE: 3      Assessment and Plan:  Rach was seen today for establish care.    Diagnoses and all orders for this visit:    Essential hypertension. Blood pressure is within acceptable range today. Recommend reducing the dose of nifedipine to 60 mg daily. Patient was advised that blood pressure may take about 6 months to stabilize given recent preeclampsia. Recommend close monitoring at this time. Advised on increased risk of preeclampsia in the future pregnancy. Patient was also advised on increased risk for hypertension and cardiovascular disease in the future. Encouraged lifestyle modifications.   -     NIFEdipine ER (ADALAT CC) 30 MG 24 hr tablet; Take 2 tablets (60 mg) by mouth daily  -      Comprehensive metabolic panel      Total time spent 45  minutes.  More than 50% of the time spent with Ms. Peterson on counseling / coordinating her care    Aishwarya Lloyd MD

## 2020-09-08 NOTE — PROGRESS NOTES
HPI  Patient reports that she was diagnosed with preeclampsia at 40 weeks of her pregnancy. She delivered at 40 weeks. She had to stay at the hospital for several days and was discharged on     Review of Systems     Constitutional:  Negative for fever, chills, weight loss, weight gain, fatigue, decreased appetite, night sweats, recent stressors, height gain, height loss, post-operative complications, incisional pain, hallucinations, increased energy, hyperactivity and confused.   HENT:  Negative for ear pain, hearing loss, tinnitus, nosebleeds, trouble swallowing, hoarse voice, mouth sores, sore throat, ear discharge, tooth pain, gum tenderness, taste disturbance, smell disturbance, hearing aid, bleeding gums, dry mouth, sinus pain, sinus congestion and neck mass.    Eyes:  Negative for double vision, pain, redness, eye pain, decreased vision, eye watering, eye bulging, eye dryness, flashing lights, spots, floaters, strabismus, tunnel vision, jaundice and eye irritation.   Respiratory:   Negative for cough, hemoptysis, sputum production, shortness of breath, wheezing, sleep disturbances due to breathing, snores loudly, respiratory pain, dyspnea on exertion, cough disturbing sleep and postural dyspnea.    Cardiovascular:  Positive for chest pain and hypertension. Negative for dyspnea on exertion, palpitations, orthopnea, fingers/toes turn blue, hypotension, syncope, history of heart murmur, pacemaker, few scattered varicosities, leg pain, sleep disturbances due to breathing, light-headedness, exercise intolerance and edema.   Gastrointestinal:  Negative for heartburn, nausea, vomiting, abdominal pain, diarrhea, constipation, blood in stool, melena, rectal pain, bloating, hemorrhoids, bowel incontinence, jaundice, rectal bleeding, coffee ground emesis and change in stool.   Genitourinary:  Negative for bladder incontinence, dysuria, urgency, hematuria, flank pain, vaginal discharge, difficulty urinating, genital  sores, dyspareunia, decreased libido, nocturia, voiding less frequently, arousal difficulty, abnormal vaginal bleeding, excessive menstruation, menstrual changes, hot flashes, vaginal dryness and postmenopausal bleeding.   Musculoskeletal:  Negative for myalgias, back pain, joint swelling, arthralgias, stiffness, muscle cramps, neck pain, bone pain, muscle weakness and fracture.   Skin:  Positive for hair changes, acne and sensitivity to sunlight. Negative for nail changes, itching, poor wound healing, rash, skin changes, warts, poor wound healing, scarring, flaky skin, Raynaud's phenomenon and skin thickening.   Neurological:  Negative for dizziness, tingling, tremors, speech change, seizures, loss of consciousness, weakness, light-headedness, numbness, headaches, disturbances in coordination, extremity numbness, memory loss, difficulty walking and paralysis.   Endo/Heme:  Negative for anemia, swollen glands and bruises/bleeds easily.   Psychiatric/Behavioral:  Negative for depression, hallucinations, memory loss, decreased concentration, mood swings and panic attacks.    Breast:  Negative for breast discharge, breast mass, breast pain and nipple retraction.   Endocrine:  Negative for altered temperature regulation, polyphagia, polydipsia, unwanted hair growth and change in facial hair.    Current Outpatient Medications   Medication     hydrochlorothiazide (HYDRODIURIL) 25 MG tablet     valACYclovir (VALTREX) 500 MG tablet     No current facility-administered medications for this visit.      Past Medical History:   Diagnosis Date     Anxiety     as adult, therapy, no meds or hosp     Herpes simplex without mention of complication 12/1/2011     Knee pain, right      Syncope and collapse 2006    Cardiology workup with Dr Brady, wandering atrial pacer, no activity restritction and no need for abx prophylaxis (mild pulm flow murmur)     Past Surgical History:   Procedure Laterality Date     DILATION AND EVACUATION N/A  7/3/2019    Procedure: Dilation And Evacuation;  Surgeon: Josefa Kwan MD;  Location: [facility]     HC REMOVE TONSILS/ADENOIDS,<13 Y/O      T & A <13y.o.     Family History   Problem Relation Age of Onset     Hypertension Mother      Cancer Maternal Aunt         Melanoma     Social History     Socioeconomic History     Marital status:      Spouse name: Russell     Number of children: Not on file     Years of education: Not on file     Highest education level: Not on file   Occupational History     Occupation:      Employer: [employer]     Comment: fulltime   Social Needs     Financial resource strain: Not on file     Food insecurity     Worry: Not on file     Inability: Not on file     Transportation needs     Medical: Not on file     Non-medical: Not on file   Tobacco Use     Smoking status: Never Smoker     Smokeless tobacco: Never Used     Tobacco comment: Non smoking home   Substance and Sexual Activity     Alcohol use: Not Currently     Comment: four drinks once every two weeks.     Drug use: No     Sexual activity: Yes     Partners: Male     Birth control/protection: None   Lifestyle     Physical activity     Days per week: Not on file     Minutes per session: Not on file     Stress: Not on file   Relationships     Social connections     Talks on phone: Not on file     Gets together: Not on file     Attends [employer] service: Not on file     Active member of club or organization: Not on file     Attends meetings of clubs or organizations: Not on file     Relationship status: Not on file     Intimate partner violence     Fear of current or ex partner: Not on file     Emotionally abused: Not on file     Physically abused: Not on file     Forced sexual activity: Not on file   Other Topics Concern     Parent/sibling w/ CABG, MI or angioplasty before 65F 55M? Not Asked   Social History Narrative     Not on file         Physical Exam  Vitals signs and nursing note reviewed.    Constitutional:       Appearance: Normal appearance.   HENT:      Head: Normocephalic and atraumatic.      Mouth/Throat:      Mouth: Mucous membranes are moist.   Eyes:      Pupils: Pupils are equal, round, and reactive to light.   Neck:      Musculoskeletal: Normal range of motion.   Cardiovascular:      Rate and Rhythm: Normal rate.   Pulmonary:      Effort: Pulmonary effort is normal.   Abdominal:      General: Abdomen is flat.   Musculoskeletal:         General: No edema.   Skin:     General: Skin is warm and dry.   Neurological:      General: No focal deficit present.      Mental Status: She is alert and oriented to person, place, and time. Mental status is at baseline.   Psychiatric:         Mood and Affect: Mood normal.         Behavior: Behavior normal.         Thought Content: Thought content normal.         Judgment: Judgment normal.           Answers for HPI/ROS submitted by the patient on 8/31/2020   MYNOR 7 TOTAL SCORE: 3      Assessment and Plan:  Rach was seen today for establish care.    Diagnoses and all orders for this visit:    Essential hypertension. Blood pressure is within acceptable range today. Recommend reducing the dose of nifedipine to 60 mg daily. Patient was advised that blood pressure may take about 6 months to stabilize given recent preeclampsia. Recommend close monitoring at this time. Advised on increased risk of preeclampsia in the future pregnancy. Patient was also advised on increased risk for hypertension and cardiovascular disease in the future. Encouraged lifestyle modifications.   -     NIFEdipine ER (ADALAT CC) 30 MG 24 hr tablet; Take 2 tablets (60 mg) by mouth daily  -     Comprehensive metabolic panel      Total time spent 45  minutes.  More than 50% of the time spent with Ms. Peterson on counseling / coordinating her care    Aishwarya Lloyd MD

## 2020-09-15 ENCOUNTER — MYC MEDICAL ADVICE (OUTPATIENT)
Dept: INTERNAL MEDICINE | Facility: CLINIC | Age: 30
End: 2020-09-15

## 2020-09-15 ENCOUNTER — MEDICAL CORRESPONDENCE (OUTPATIENT)
Dept: HEALTH INFORMATION MANAGEMENT | Facility: CLINIC | Age: 30
End: 2020-09-15

## 2020-09-16 NOTE — TELEPHONE ENCOUNTER
Please see [employer] message.  Pt thought her nifedipine dose was reducing after two weeks?  Please advise    Also she is wanting a note to say she is high risk and would be advised to work from home?    Thanks, Lori Lane RN

## 2020-09-28 NOTE — TELEPHONE ENCOUNTER
Aishwarya Lloyd MD  You 4 days ago       Hi,     It looks like the prescription for 30 mg was sent to her [facility] pharmacy on 9/8. Not sure why it was not given to the patient.   We have discussed the letter, I can write it as well.     Thanks!      Routing comment          Left message on [employer] voice mail to return call to nursing at [phone]. Lori Lane RN

## 2020-10-06 ENCOUNTER — VIRTUAL VISIT (OUTPATIENT)
Dept: INTERNAL MEDICINE | Facility: CLINIC | Age: 30
End: 2020-10-06
Attending: INTERNAL MEDICINE
Payer: COMMERCIAL

## 2020-10-06 DIAGNOSIS — R03.0 ELEVATED BP WITHOUT DIAGNOSIS OF HYPERTENSION: Primary | ICD-10-CM

## 2020-10-06 PROCEDURE — 99212 OFFICE O/P EST SF 10 MIN: CPT | Mod: 95 | Performed by: INTERNAL MEDICINE

## 2020-10-06 NOTE — PROGRESS NOTES
"Rach Peterson is a 29 year old female who is being evaluated via a billable telephone visit.      The patient has been notified of following:     \"This telephone visit will be conducted via a call between you and your physician/provider. We have found that certain health care needs can be provided without the need for a physical exam.  This service lets us provide the care you need with a short phone conversation.  If a prescription is necessary we can send it directly to your pharmacy.  If lab work is needed we can place an order for that and you can then stop by our lab to have the test done at a later time.    Telephone visits are billed at different rates depending on your insurance coverage. During this emergency period, for some insurers they may be billed the same as an in-person visit.  Please reach out to your insurance provider with any questions.    If during the course of the call the physician/provider feels a telephone visit is not appropriate, you will not be charged for this service.\"    Patient has given verbal consent for Telephone visit?  Yes    What phone number would you like to be contacted at? [phone]    How would you like to obtain your AVS? Guy Porter     Rach Peterson is a 29 year old female who presents via phone visit today for the following health issues:    HPI     Patient reports that she has reduced the dose of the medication recently from 60 mg of nifedipine to 30 mg of nifedipine. She denies feeling dizzy or lightheaded. She denies medication-related side effects. Her blood pressure has been within acceptable range at home.         Review of Systems   Constitutional, HEENT, cardiovascular, pulmonary, GI, , musculoskeletal, neuro, skin, endocrine and psych systems are negative, except as otherwise noted.     Current Outpatient Medications   Medication     NIFEdipine ER (ADALAT CC) 30 MG 24 hr tablet     valACYclovir (VALTREX) 500 MG tablet     No " "current facility-administered medications for this visit.        Objective        Vitals:  No vitals were obtained today due to virtual visit.    healthy, alert and no distress  PSYCH: Alert and oriented times 3; coherent speech, normal   rate and volume, able to articulate logical thoughts, able   to abstract reason, no tangential thoughts, no hallucinations   or delusions  Her affect is normal  RESP: No cough, no audible wheezing, able to talk in full sentences  Remainder of exam unable to be completed due to telephone visits            Assessment/Plan:    Assessment & Plan     Elevated BP without diagnosis of hypertension  Patient was advised to continue with current dose of medication at this time. She may discontinue nifedipine if her blood pressure is low. Patient was also advised to follow up in clinic in 1 month or sooner if blood pressure is low.        BMI:   Estimated body mass index is 22.53 kg/m  as calculated from the following:    Height as of 7/10/20: 1.778 m (5' 10\").    Weight as of 9/8/20: 71.2 kg (157 lb).                Return in about 1 month (around 11/6/2020).    Aishwarya Lloyd MD  [facility]    Phone call duration:  11 minutes                "

## 2020-10-06 NOTE — LETTER
"10/6/2020       RE: Rach Peterson  [address] [place] [address]     Dear Colleague,    Thank you for referring your patient, Rach Peterson, to the [facility]. Please see a copy of my visit note below.    Rach Peterson is a 29 year old female who is being evaluated via a billable telephone visit.      The patient has been notified of following:     \"This telephone visit will be conducted via a call between you and your physician/provider. We have found that certain health care needs can be provided without the need for a physical exam.  This service lets us provide the care you need with a short phone conversation.  If a prescription is necessary we can send it directly to your pharmacy.  If lab work is needed we can place an order for that and you can then stop by our lab to have the test done at a later time.    Telephone visits are billed at different rates depending on your insurance coverage. During this emergency period, for some insurers they may be billed the same as an in-person visit.  Please reach out to your insurance provider with any questions.    If during the course of the call the physician/provider feels a telephone visit is not appropriate, you will not be charged for this service.\"    Patient has given verbal consent for Telephone visit?  Yes    What phone number would you like to be contacted at? [phone]    How would you like to obtain your AVS? Guy    Subjective     Rach Peterson is a 29 year old female who presents via phone visit today for the following health issues:    HPI     Patient reports that she has reduced the dose of the medication recently from 60 mg of nifedipine to 30 mg of nifedipine. She denies feeling dizzy or lightheaded. She denies medication-related side effects. Her blood pressure has been within acceptable range at home.         Review " "of Systems   Constitutional, HEENT, cardiovascular, pulmonary, GI, , musculoskeletal, neuro, skin, endocrine and psych systems are negative, except as otherwise noted.     Current Outpatient Medications   Medication     NIFEdipine ER (ADALAT CC) 30 MG 24 hr tablet     valACYclovir (VALTREX) 500 MG tablet     No current facility-administered medications for this visit.        Objective        Vitals:  No vitals were obtained today due to virtual visit.    healthy, alert and no distress  PSYCH: Alert and oriented times 3; coherent speech, normal   rate and volume, able to articulate logical thoughts, able   to abstract reason, no tangential thoughts, no hallucinations   or delusions  Her affect is normal  RESP: No cough, no audible wheezing, able to talk in full sentences  Remainder of exam unable to be completed due to telephone visits            Assessment/Plan:    Assessment & Plan     Elevated BP without diagnosis of hypertension  Patient was advised to continue with current dose of medication at this time. She may discontinue nifedipine if her blood pressure is low. Patient was also advised to follow up in clinic in 1 month or sooner if blood pressure is low.        BMI:   Estimated body mass index is 22.53 kg/m  as calculated from the following:    Height as of 7/10/20: 1.778 m (5' 10\").    Weight as of 9/8/20: 71.2 kg (157 lb).                Return in about 1 month (around 11/6/2020).    Aishwarya Lloyd MD  [facility]    Phone call duration:  11 minutes          "

## 2020-10-30 ASSESSMENT — ANXIETY QUESTIONNAIRES
3. WORRYING TOO MUCH ABOUT DIFFERENT THINGS: NOT AT ALL
4. TROUBLE RELAXING: NOT AT ALL
GAD7 TOTAL SCORE: 2
2. NOT BEING ABLE TO STOP OR CONTROL WORRYING: NOT AT ALL
1. FEELING NERVOUS, ANXIOUS, OR ON EDGE: SEVERAL DAYS
6. BECOMING EASILY ANNOYED OR IRRITABLE: SEVERAL DAYS
7. FEELING AFRAID AS IF SOMETHING AWFUL MIGHT HAPPEN: NOT AT ALL
GAD7 TOTAL SCORE: 2
7. FEELING AFRAID AS IF SOMETHING AWFUL MIGHT HAPPEN: NOT AT ALL
5. BEING SO RESTLESS THAT IT IS HARD TO SIT STILL: NOT AT ALL

## 2020-10-31 ASSESSMENT — ANXIETY QUESTIONNAIRES: GAD7 TOTAL SCORE: 2

## 2020-11-13 ENCOUNTER — OFFICE VISIT (OUTPATIENT)
Dept: INTERNAL MEDICINE | Facility: CLINIC | Age: 30
End: 2020-11-13
Attending: INTERNAL MEDICINE
Payer: COMMERCIAL

## 2020-11-13 VITALS
WEIGHT: 152 LBS | HEART RATE: 70 BPM | DIASTOLIC BLOOD PRESSURE: 81 MMHG | BODY MASS INDEX: 21.81 KG/M2 | SYSTOLIC BLOOD PRESSURE: 118 MMHG

## 2020-11-13 DIAGNOSIS — R03.0 ELEVATED BLOOD PRESSURE READING WITHOUT DIAGNOSIS OF HYPERTENSION: Primary | ICD-10-CM

## 2020-11-13 PROCEDURE — G0008 ADMIN INFLUENZA VIRUS VAC: HCPCS

## 2020-11-13 PROCEDURE — G0463 HOSPITAL OUTPT CLINIC VISIT: HCPCS

## 2020-11-13 PROCEDURE — 90686 IIV4 VACC NO PRSV 0.5 ML IM: CPT

## 2020-11-13 PROCEDURE — 250N000011 HC RX IP 250 OP 636

## 2020-11-13 PROCEDURE — 99213 OFFICE O/P EST LOW 20 MIN: CPT | Performed by: INTERNAL MEDICINE

## 2020-11-13 ASSESSMENT — PAIN SCALES - GENERAL: PAINLEVEL: NO PAIN (0)

## 2020-11-13 NOTE — LETTER
11/13/2020       RE: Rach Peterson  [address]  [place] [address]     Dear Colleague,    Thank you for referring your patient, Rach Peterson, to the [facility]. Please see a copy of my visit note below.    HPI  Patient is here for follow up on elevated blood pressure. She     Review of Systems     Constitutional:  Negative for fever and fatigue.   HENT:  Negative for dry mouth and sinus congestion.    Eyes:  Negative for decreased vision.   Respiratory:   Negative for cough and dyspnea on exertion.    Cardiovascular:  Negative for chest pain, dyspnea on exertion and edema.   Gastrointestinal:  Negative for nausea, vomiting, abdominal pain, constipation and melena.   Skin:  Negative for itching and rash.   Endo/Heme:  Negative for anemia, swollen glands and bruises/bleeds easily.   Psychiatric/Behavioral:  Negative for depression, decreased concentration, mood swings and panic attacks.    Endocrine:  Negative for altered temperature regulation, polyphagia, polydipsia, unwanted hair growth and change in facial hair.    Current Outpatient Medications   Medication     valACYclovir (VALTREX) 500 MG tablet     No current facility-administered medications for this visit.      Vitals:    11/13/20 1412 11/13/20 1420 11/13/20 1421 11/13/20 1422   BP: 120/80 118/83 116/81 118/81   Pulse: 70 70 70 70   Weight: 68.9 kg (152 lb)          Physical Exam  Vitals signs and nursing note reviewed.   Constitutional:       Appearance: Normal appearance.   HENT:      Head: Normocephalic and atraumatic.   Eyes:      Pupils: Pupils are equal, round, and reactive to light.   Cardiovascular:      Rate and Rhythm: Normal rate.      Pulses: Normal pulses.   Pulmonary:      Effort: Pulmonary effort is normal.   Abdominal:      General: Abdomen is flat.   Musculoskeletal:         General: No edema.   Neurological:      Mental Status:  She is alert.   Psychiatric:         Mood and Affect: Mood normal.         Thought Content: Thought content normal.         Judgment: Judgment normal.         Assessment and Plan:  Rach was seen today for recheck.    Diagnoses and all orders for this visit:    Elevated blood pressure reading without diagnosis of hypertension. Patient was advised on blood pressure goals and risks associated with gestational hypertension. Recommend close monitoring and regualr screening for diabetes and hypertension.     Other orders  -     FLU VAC PRESRV FREE QUAD SPLIT VIR 3+YRS IM    Total time spent 15 minutes.  More than 50% of the time spent with Ms. Peterson on counseling / coordinating her care    Aishwarya Lloyd MD

## 2020-11-13 NOTE — PROGRESS NOTES
HPI  Patient is here for follow up on elevated blood pressure. She     Review of Systems     Constitutional:  Negative for fever and fatigue.   HENT:  Negative for dry mouth and sinus congestion.    Eyes:  Negative for decreased vision.   Respiratory:   Negative for cough and dyspnea on exertion.    Cardiovascular:  Negative for chest pain, dyspnea on exertion and edema.   Gastrointestinal:  Negative for nausea, vomiting, abdominal pain, constipation and melena.   Skin:  Negative for itching and rash.   Endo/Heme:  Negative for anemia, swollen glands and bruises/bleeds easily.   Psychiatric/Behavioral:  Negative for depression, decreased concentration, mood swings and panic attacks.    Endocrine:  Negative for altered temperature regulation, polyphagia, polydipsia, unwanted hair growth and change in facial hair.    Current Outpatient Medications   Medication     valACYclovir (VALTREX) 500 MG tablet     No current facility-administered medications for this visit.      Vitals:    11/13/20 1412 11/13/20 1420 11/13/20 1421 11/13/20 1422   BP: 120/80 118/83 116/81 118/81   Pulse: 70 70 70 70   Weight: 68.9 kg (152 lb)          Physical Exam  Vitals signs and nursing note reviewed.   Constitutional:       Appearance: Normal appearance.   HENT:      Head: Normocephalic and atraumatic.   Eyes:      Pupils: Pupils are equal, round, and reactive to light.   Cardiovascular:      Rate and Rhythm: Normal rate.      Pulses: Normal pulses.   Pulmonary:      Effort: Pulmonary effort is normal.   Abdominal:      General: Abdomen is flat.   Musculoskeletal:         General: No edema.   Neurological:      Mental Status: She is alert.   Psychiatric:         Mood and Affect: Mood normal.         Thought Content: Thought content normal.         Judgment: Judgment normal.         Assessment and Plan:  Rach was seen today for recheck.    Diagnoses and all orders for this visit:    Elevated blood pressure reading without diagnosis of  hypertension. Patient was advised on blood pressure goals and risks associated with gestational hypertension. Recommend close monitoring and regualr screening for diabetes and hypertension.     Other orders  -     FLU VAC PRESRV FREE QUAD SPLIT VIR 3+YRS IM    Total time spent 15 minutes.  More than 50% of the time spent with Ms. Peterson on counseling / coordinating her care    Aishwarya Lloyd MD

## 2020-11-15 ASSESSMENT — ENCOUNTER SYMPTOMS
FATIGUE: 0
POLYDIPSIA: 0
NERVOUS/ANXIOUS: 0
DYSPNEA ON EXERTION: 0
NAUSEA: 0
CONSTIPATION: 0
SWOLLEN GLANDS: 0
ALTERED TEMPERATURE REGULATION: 0
COUGH: 0
ABDOMINAL PAIN: 0
DEPRESSION: 0
VOMITING: 0
DECREASED CONCENTRATION: 0
BRUISES/BLEEDS EASILY: 0
POLYPHAGIA: 0
INSOMNIA: 0
PANIC: 0
FEVER: 0
SINUS CONGESTION: 0

## 2021-01-14 ENCOUNTER — HEALTH MAINTENANCE LETTER (OUTPATIENT)
Age: 31
End: 2021-01-14

## 2021-02-18 ASSESSMENT — ENCOUNTER SYMPTOMS
PALPITATIONS: 0
CHILLS: 0
EYE PAIN: 0
DIZZINESS: 0
NAUSEA: 0
SORE THROAT: 0
PARESTHESIAS: 0
WEAKNESS: 0
SHORTNESS OF BREATH: 0
HEMATURIA: 0
HEARTBURN: 0
ARTHRALGIAS: 0
DIARRHEA: 0
HEADACHES: 0
HEMATOCHEZIA: 0
ABDOMINAL PAIN: 0
FREQUENCY: 0
JOINT SWELLING: 0
DYSURIA: 0
FEVER: 0
CONSTIPATION: 0
NERVOUS/ANXIOUS: 0
BREAST MASS: 0
COUGH: 0
MYALGIAS: 0

## 2021-02-24 ENCOUNTER — OFFICE VISIT (OUTPATIENT)
Dept: FAMILY MEDICINE | Facility: CLINIC | Age: 31
End: 2021-02-24
Payer: COMMERCIAL

## 2021-02-24 VITALS
SYSTOLIC BLOOD PRESSURE: 132 MMHG | DIASTOLIC BLOOD PRESSURE: 72 MMHG | BODY MASS INDEX: 21.18 KG/M2 | TEMPERATURE: 98.9 F | OXYGEN SATURATION: 100 % | HEART RATE: 83 BPM | HEIGHT: 69 IN | WEIGHT: 143 LBS | RESPIRATION RATE: 16 BRPM

## 2021-02-24 DIAGNOSIS — Z00.00 ROUTINE GENERAL MEDICAL EXAMINATION AT A HEALTH CARE FACILITY: Primary | ICD-10-CM

## 2021-02-24 DIAGNOSIS — F41.9 ANXIETY: ICD-10-CM

## 2021-02-24 DIAGNOSIS — Z80.8 FAMILY HISTORY OF SKIN CANCER: ICD-10-CM

## 2021-02-24 DIAGNOSIS — B00.9 HERPES SIMPLEX TYPE 2 INFECTION: ICD-10-CM

## 2021-02-24 PROCEDURE — 99395 PREV VISIT EST AGE 18-39: CPT | Performed by: NURSE PRACTITIONER

## 2021-02-24 RX ORDER — VALACYCLOVIR HYDROCHLORIDE 500 MG/1
500 TABLET, FILM COATED ORAL DAILY
Qty: 90 TABLET | Refills: 3 | Status: SHIPPED | OUTPATIENT
Start: 2021-02-24 | End: 2022-06-27

## 2021-02-24 ASSESSMENT — ENCOUNTER SYMPTOMS
HEMATOCHEZIA: 0
HEADACHES: 0
ARTHRALGIAS: 0
ABDOMINAL PAIN: 0
CHILLS: 0
SORE THROAT: 0
PALPITATIONS: 0
DYSURIA: 0
HEARTBURN: 0
JOINT SWELLING: 0
MYALGIAS: 0
BREAST MASS: 0
HEMATURIA: 0
CONSTIPATION: 0
FEVER: 0
DIARRHEA: 0
DIZZINESS: 0
EYE PAIN: 0
SHORTNESS OF BREATH: 0
COUGH: 0
NAUSEA: 0
PARESTHESIAS: 0
NERVOUS/ANXIOUS: 0
WEAKNESS: 0
FREQUENCY: 0

## 2021-02-24 ASSESSMENT — MIFFLIN-ST. JEOR: SCORE: 1438.02

## 2021-02-24 NOTE — PROGRESS NOTES
SUBJECTIVE:   CC: Rach Peterson is an 30 year old woman who presents for preventive health visit.     Patient has been advised of split billing requirements and indicates understanding: Yes     No mood or depression concerns. [facility].     Healthy Habits:     Getting at least 3 servings of Calcium per day:  Yes    Bi-annual eye exam:  Yes    Dental care twice a year:  Yes    Sleep apnea or symptoms of sleep apnea:  None    Diet:  Regular (no restrictions)    Frequency of exercise:  4-5 days/week    Duration of exercise:  30-45 minutes    Taking medications regularly:  No    Barriers to taking medications:  Not applicable    Medication side effects:  None    PHQ-2 Total Score: 1    Additional concerns today:  No      Today's PHQ-2 Score:   PHQ-2 ( 1999 [employer]) 2/18/2021   Q1: Little interest or pleasure in doing things 0   Q2: Feeling down, depressed or hopeless 1   PHQ-2 Score 1   Q1: Little interest or pleasure in doing things Not at all   Q2: Feeling down, depressed or hopeless Several days   PHQ-2 Score 1     Abuse: Current or Past (Physical, Sexual or Emotional) - No  Do you feel safe in your environment? Yes    Social History     Tobacco Use     Smoking status: Never Smoker     Smokeless tobacco: Never Used     Tobacco comment: Non smoking home   Substance Use Topics     Alcohol use: Not Currently     Comment: four drinks once every two weeks.       Alcohol Use 2/18/2021   Prescreen: >3 drinks/day or >7 drinks/week? Not Applicable   Prescreen: >3 drinks/day or >7 drinks/week? -       Any new diagnosis of family breast, ovarian, or bowel cancer? No     Reviewed orders with patient.  Reviewed health maintenance and updated orders accordingly - Yes    Breast CA Risk Screening:  No family hx of breast CA  Patient under 40 years of age: Routine Mammogram Screening not recommended.   Pertinent mammograms are reviewed under the imaging tab.    History of abnormal Pap smear: NO - age 21-29  "PAP every 3 years recommended  PAP / HPV 8/25/2020 5/5/2017 5/23/2014   PAP NIL NIL NIL     Reviewed and updated as needed this visit by clinical staff  Tobacco  Allergies  Meds  Problems  Med Hx  Surg Hx  Fam Hx  Soc Hx          Reviewed and updated as needed this visit by Provider    Review of Systems   Constitutional: Negative for chills and fever.   HENT: Negative for congestion, ear pain, hearing loss and sore throat.    Eyes: Negative for pain and visual disturbance.   Respiratory: Negative for cough and shortness of breath.    Cardiovascular: Negative for chest pain, palpitations and peripheral edema.   Gastrointestinal: Negative for abdominal pain, constipation, diarrhea, heartburn, hematochezia and nausea.   Breasts:  Negative for tenderness, breast mass and discharge.   Genitourinary: Negative for dysuria, frequency, genital sores, hematuria, pelvic pain, urgency, vaginal bleeding and vaginal discharge.   Musculoskeletal: Negative for arthralgias, joint swelling and myalgias.   Skin: Negative for rash.   Neurological: Negative for dizziness, weakness, headaches and paresthesias.   Psychiatric/Behavioral: Negative for mood changes. The patient is not nervous/anxious.         OBJECTIVE:   /72 (BP Location: Right arm, Patient Position: Chair, Cuff Size: Adult Regular)   Pulse 83   Temp 98.9  F (37.2  C) (Oral)   Resp 16   Ht 1.753 m (5' 9\")   Wt 64.9 kg (143 lb)   LMP 02/16/2021 (Exact Date)   SpO2 100%   Breastfeeding No   BMI 21.12 kg/m       Physical Exam  GENERAL: healthy, alert and no distress.  EYES: Eyes grossly normal to inspection, PERRL and conjunctivae and sclerae normal  HENT: ear canals and TM's normal, nose and mouth without ulcers or lesions  NECK: no adenopathy, no asymmetry, masses, or scars and thyroid normal to palpation  RESP: lungs clear to auscultation - no rales, rhonchi or wheezes  CV: regular rate and rhythm, normal S1 S2, no S3 or S4, no murmur, click or rub, no " "peripheral edema and peripheral pulses strong  ABDOMEN: soft, nontender, no hepatosplenomegaly, no masses and bowel sounds normal  MS: no gross musculoskeletal defects noted, no edema  SKIN: no suspicious lesions or rashes. Pale skin.  NEURO: Normal strength and tone, mentation intact and speech normal  PSYCH: mentation appears normal, affect normal/bright    Diagnostic Test Results:  none     ASSESSMENT/PLAN:   Rach was seen today for physical.    Diagnoses and all orders for this visit:    Routine general medical examination at a health care facility    Herpes simplex type 2 infection  Comments:  Genital herpes simplex, when taking medication daily never has outbreaks.   Orders:  -     valACYclovir (VALTREX) 500 MG tablet; Take 1 tablet (500 mg) by mouth daily    Family history of skin cancer  Comments:  Recommended skin check with dermatology due to family history of skin cancer and patient's pale skin that burns easily.  Orders:  -     DERMATOLOGY ADULT REFERRAL; Future    Anxiety  Comments:  Seeing psychotherapist. Reports anxiety is well-managed. Sleeping well and work is going well. She enjoys being a parent.     Rach advised to start a prenatal vitamin and use as directed a few months prior to getting pregnant again. She is planning on trying to get pregnant again soon, is concerned about her preeclampsia with the last pregnancy and postpartum HTN.     Patient has been advised of split billing requirements and indicates understanding: Yes  COUNSELING:  Reviewed preventive health counseling, as reflected in patient instructions       Regular exercise       Healthy diet/nutrition       Family planning       Folic Acid       Prenatal vitamin    Estimated body mass index is 21.12 kg/m  as calculated from the following:    Height as of this encounter: 1.753 m (5' 9\").    Weight as of this encounter: 64.9 kg (143 lb).        She reports that she has never smoked. She has never used smokeless " tobacco.      Counseling Resources:  ATP IV Guidelines  Pooled Cohorts Equation Calculator  Breast Cancer Risk Calculator  BRCA-Related Cancer Risk Assessment: FHS-7 Tool  FRAX Risk Assessment  ICSI Preventive Guidelines  Dietary Guidelines for Americans, 2010  USDA's MyPlate  ASA Prophylaxis  Lung CA Screening    Amber Brock, RN, DNP student  I have personally seen the patient; repeated exam and reviewed and edited documentation    GUANAKO Damian CNP  [facility]

## 2021-04-15 ASSESSMENT — ANXIETY QUESTIONNAIRES
3. WORRYING TOO MUCH ABOUT DIFFERENT THINGS: SEVERAL DAYS
1. FEELING NERVOUS, ANXIOUS, OR ON EDGE: SEVERAL DAYS
GAD7 TOTAL SCORE: 5
4. TROUBLE RELAXING: NOT AT ALL
5. BEING SO RESTLESS THAT IT IS HARD TO SIT STILL: NOT AT ALL
6. BECOMING EASILY ANNOYED OR IRRITABLE: SEVERAL DAYS
7. FEELING AFRAID AS IF SOMETHING AWFUL MIGHT HAPPEN: SEVERAL DAYS
2. NOT BEING ABLE TO STOP OR CONTROL WORRYING: SEVERAL DAYS

## 2021-04-16 ASSESSMENT — ANXIETY QUESTIONNAIRES: GAD7 TOTAL SCORE: 5

## 2021-04-26 ASSESSMENT — ANXIETY QUESTIONNAIRES
6. BECOMING EASILY ANNOYED OR IRRITABLE: SEVERAL DAYS
GAD7 TOTAL SCORE: 5
2. NOT BEING ABLE TO STOP OR CONTROL WORRYING: SEVERAL DAYS
4. TROUBLE RELAXING: NOT AT ALL
GAD7 TOTAL SCORE: 5
7. FEELING AFRAID AS IF SOMETHING AWFUL MIGHT HAPPEN: SEVERAL DAYS
7. FEELING AFRAID AS IF SOMETHING AWFUL MIGHT HAPPEN: SEVERAL DAYS
3. WORRYING TOO MUCH ABOUT DIFFERENT THINGS: SEVERAL DAYS
1. FEELING NERVOUS, ANXIOUS, OR ON EDGE: SEVERAL DAYS
5. BEING SO RESTLESS THAT IT IS HARD TO SIT STILL: NOT AT ALL

## 2021-04-29 ENCOUNTER — OFFICE VISIT (OUTPATIENT)
Dept: OBGYN | Facility: CLINIC | Age: 31
End: 2021-04-29
Attending: OBSTETRICS & GYNECOLOGY
Payer: COMMERCIAL

## 2021-04-29 ENCOUNTER — ANCILLARY PROCEDURE (OUTPATIENT)
Dept: ULTRASOUND IMAGING | Facility: CLINIC | Age: 31
End: 2021-04-29
Attending: OBSTETRICS & GYNECOLOGY
Payer: COMMERCIAL

## 2021-04-29 ENCOUNTER — TRANSCRIBE ORDERS (OUTPATIENT)
Dept: MATERNAL FETAL MEDICINE | Facility: CLINIC | Age: 31
End: 2021-04-29

## 2021-04-29 VITALS
WEIGHT: 144 LBS | HEIGHT: 70 IN | BODY MASS INDEX: 20.62 KG/M2 | HEART RATE: 76 BPM | SYSTOLIC BLOOD PRESSURE: 131 MMHG | DIASTOLIC BLOOD PRESSURE: 83 MMHG

## 2021-04-29 DIAGNOSIS — O26.90 PREGNANCY RELATED CONDITION, ANTEPARTUM: Primary | ICD-10-CM

## 2021-04-29 DIAGNOSIS — Z87.59 HISTORY OF SEVERE PRE-ECLAMPSIA: ICD-10-CM

## 2021-04-29 DIAGNOSIS — O09.90 HIGH-RISK PREGNANCY, UNSPECIFIED TRIMESTER: Primary | ICD-10-CM

## 2021-04-29 DIAGNOSIS — F41.9 ANXIETY: ICD-10-CM

## 2021-04-29 DIAGNOSIS — Z34.91 ENCOUNTER FOR SUPERVISION OF NORMAL PREGNANCY IN FIRST TRIMESTER, UNSPECIFIED GRAVIDITY: ICD-10-CM

## 2021-04-29 LAB
ABO + RH BLD: NORMAL
ABO + RH BLD: NORMAL
ALT SERPL W P-5'-P-CCNC: 16 U/L (ref 0–50)
AST SERPL W P-5'-P-CCNC: 13 U/L (ref 0–45)
BLD GP AB SCN SERPL QL: NORMAL
BLOOD BANK CMNT PATIENT-IMP: NORMAL
CREAT UR-MCNC: 62 MG/DL
DEPRECATED CALCIDIOL+CALCIFEROL SERPL-MC: 30 UG/L (ref 20–75)
ERYTHROCYTE [DISTWIDTH] IN BLOOD BY AUTOMATED COUNT: 12.3 % (ref 10–15)
HBV SURFACE AB SERPL IA-ACNC: 78.9 M[IU]/ML
HBV SURFACE AG SERPL QL IA: NONREACTIVE
HCT VFR BLD AUTO: 38.7 % (ref 35–47)
HCV AB SERPL QL IA: NONREACTIVE
HGB BLD-MCNC: 13 G/DL (ref 11.7–15.7)
HIV 1+2 AB+HIV1 P24 AG SERPL QL IA: NONREACTIVE
MCH RBC QN AUTO: 30.9 PG (ref 26.5–33)
MCHC RBC AUTO-ENTMCNC: 33.6 G/DL (ref 31.5–36.5)
MCV RBC AUTO: 92 FL (ref 78–100)
PLATELET # BLD AUTO: 265 10E9/L (ref 150–450)
PROT UR-MCNC: <0.05 G/L
PROT/CREAT 24H UR: NORMAL G/G CR (ref 0–0.2)
RBC # BLD AUTO: 4.21 10E12/L (ref 3.8–5.2)
SPECIMEN EXP DATE BLD: NORMAL
T PALLIDUM AB SER QL: NONREACTIVE
URATE SERPL-MCNC: 2.3 MG/DL (ref 2.6–6)
WBC # BLD AUTO: 9.5 10E9/L (ref 4–11)

## 2021-04-29 PROCEDURE — 85027 COMPLETE CBC AUTOMATED: CPT | Performed by: ADVANCED PRACTICE MIDWIFE

## 2021-04-29 PROCEDURE — 84550 ASSAY OF BLOOD/URIC ACID: CPT | Performed by: ADVANCED PRACTICE MIDWIFE

## 2021-04-29 PROCEDURE — 84156 ASSAY OF PROTEIN URINE: CPT | Performed by: ADVANCED PRACTICE MIDWIFE

## 2021-04-29 PROCEDURE — 99207 PR PRENATAL VISIT: CPT | Performed by: ADVANCED PRACTICE MIDWIFE

## 2021-04-29 PROCEDURE — 76801 OB US < 14 WKS SINGLE FETUS: CPT | Mod: 26 | Performed by: OBSTETRICS & GYNECOLOGY

## 2021-04-29 PROCEDURE — 87340 HEPATITIS B SURFACE AG IA: CPT | Performed by: ADVANCED PRACTICE MIDWIFE

## 2021-04-29 PROCEDURE — 86706 HEP B SURFACE ANTIBODY: CPT | Performed by: ADVANCED PRACTICE MIDWIFE

## 2021-04-29 PROCEDURE — 84460 ALANINE AMINO (ALT) (SGPT): CPT | Performed by: ADVANCED PRACTICE MIDWIFE

## 2021-04-29 PROCEDURE — 76801 OB US < 14 WKS SINGLE FETUS: CPT

## 2021-04-29 PROCEDURE — 86762 RUBELLA ANTIBODY: CPT | Performed by: ADVANCED PRACTICE MIDWIFE

## 2021-04-29 PROCEDURE — 87389 HIV-1 AG W/HIV-1&-2 AB AG IA: CPT | Performed by: ADVANCED PRACTICE MIDWIFE

## 2021-04-29 PROCEDURE — 87086 URINE CULTURE/COLONY COUNT: CPT | Performed by: ADVANCED PRACTICE MIDWIFE

## 2021-04-29 PROCEDURE — 86803 HEPATITIS C AB TEST: CPT | Performed by: ADVANCED PRACTICE MIDWIFE

## 2021-04-29 PROCEDURE — 87088 URINE BACTERIA CULTURE: CPT | Performed by: ADVANCED PRACTICE MIDWIFE

## 2021-04-29 PROCEDURE — 84450 TRANSFERASE (AST) (SGOT): CPT | Performed by: ADVANCED PRACTICE MIDWIFE

## 2021-04-29 PROCEDURE — 36415 COLL VENOUS BLD VENIPUNCTURE: CPT | Performed by: ADVANCED PRACTICE MIDWIFE

## 2021-04-29 PROCEDURE — 82306 VITAMIN D 25 HYDROXY: CPT | Performed by: ADVANCED PRACTICE MIDWIFE

## 2021-04-29 PROCEDURE — 86850 RBC ANTIBODY SCREEN: CPT | Performed by: ADVANCED PRACTICE MIDWIFE

## 2021-04-29 PROCEDURE — G0463 HOSPITAL OUTPT CLINIC VISIT: HCPCS

## 2021-04-29 PROCEDURE — 86901 BLOOD TYPING SEROLOGIC RH(D): CPT | Performed by: ADVANCED PRACTICE MIDWIFE

## 2021-04-29 PROCEDURE — 86780 TREPONEMA PALLIDUM: CPT | Performed by: ADVANCED PRACTICE MIDWIFE

## 2021-04-29 PROCEDURE — 86900 BLOOD TYPING SEROLOGIC ABO: CPT | Performed by: ADVANCED PRACTICE MIDWIFE

## 2021-04-29 RX ORDER — PRENATAL VIT/IRON FUM/FOLIC AC 27MG-0.8MG
1 TABLET ORAL DAILY
COMMUNITY
End: 2022-05-16

## 2021-04-29 SDOH — ECONOMIC STABILITY: TRANSPORTATION INSECURITY
IN THE PAST 12 MONTHS, HAS LACK OF TRANSPORTATION KEPT YOU FROM MEETINGS, WORK, OR FROM GETTING THINGS NEEDED FOR DAILY LIVING?: NOT ASKED

## 2021-04-29 SDOH — ECONOMIC STABILITY: TRANSPORTATION INSECURITY
IN THE PAST 12 MONTHS, HAS THE LACK OF TRANSPORTATION KEPT YOU FROM MEDICAL APPOINTMENTS OR FROM GETTING MEDICATIONS?: NOT ASKED

## 2021-04-29 SDOH — ECONOMIC STABILITY: FOOD INSECURITY: WITHIN THE PAST 12 MONTHS, THE FOOD YOU BOUGHT JUST DIDN'T LAST AND YOU DIDN'T HAVE MONEY TO GET MORE.: NOT ASKED

## 2021-04-29 SDOH — ECONOMIC STABILITY: FOOD INSECURITY: WITHIN THE PAST 12 MONTHS, YOU WORRIED THAT YOUR FOOD WOULD RUN OUT BEFORE YOU GOT MONEY TO BUY MORE.: NOT ASKED

## 2021-04-29 SDOH — ECONOMIC STABILITY: INCOME INSECURITY: HOW HARD IS IT FOR YOU TO PAY FOR THE VERY BASICS LIKE FOOD, HOUSING, MEDICAL CARE, AND HEATING?: NOT ASKED

## 2021-04-29 ASSESSMENT — PAIN SCALES - GENERAL: PAINLEVEL: NO PAIN (0)

## 2021-04-29 ASSESSMENT — MIFFLIN-ST. JEOR: SCORE: 1458.43

## 2021-04-29 NOTE — PROGRESS NOTES
[facility] OB Intake note  Subjective   30 year old female presents to clinic for initiation of OB care. Patient's last menstrual period was 2021 (exact date).  at 10w2d by LMP Estimated Date of Delivery: 2021  US confirms. Reviewed dating ultrasound. Pregnancy is planned.    Partner name - Tim.       Symptoms since LMP include fatigue. Denies any abdominal pain or cramping, vaginal bleeding or spotting, nausea or vomiting. She has started a prenatal vitamin. Mentions history of pre-eclampsia and was on antihypertensives for approximately 4 months after delivery. Not currently taking any antihypertensives.     - Genetic/Infection questionnaire completed, risks include h/o genital herpes. She is taking valtrex. Pt  does not have a recent known exposure to Parvo or CMV so IgG/IgM testing WILL NOT be ordered.   Patient received flu vaccine in 2020. 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     Genital herpes 2011     Herpes simplex without mention of complication 2011     Hypertension 2020    Tied to pre-eclampsia, seems to have returned to normal     Knee pain, right      Syncope and collapse     Cardiology workup with Dr Brady, wandering atrial pacer, no activity restritction and no need for abx prophylaxis (mild pulm flow murmur)     - Risk for GDM : No known risk factors for GDMso  WILL NOT have an  early GCT and Hgb A1C    - High risk factors for Pre E-  History of Pre Eclampsia       - Moderate risk factor for Pre E No moderate risk factors  Meets one high risk factor so WILL consider starting low dose aspirin (81mg) starting between 12 and 28 weeks to prevent early onset preeclampsia    - The patient  does not have a history of spontaneous  birth so  WILL NOT consider progesterone starting at 16-20 weeks and/or serial transvaginal cervical length ultrasounds from 16-24 weeks.     -The patient does not have a history of immunosuppression or HIV so Toxoplasma IgG/IgM WILL NOT be ordered.       PERSONAL/SOCIAL HISTORY  Patient is   Lives with their spouse and child.  Employment: Part time as a .  Job involves sedentary activity .  Her partner works as an .   History of anxiety. Not currently taking any medications but sees a therapist weekly and states this is going well.  Additional items: None    Objective  -VS: reviewed and within normal limits   -General appearance: no acute distress, patient is comfortable   NEUROLOGICAL/PSYCHIATRIC   - Orientated x3,   -Mood and affect: : normal     Assessment/Plan  Rach was seen today for prenatal care.    Diagnoses and all orders for this visit:    High-risk pregnancy, unspecified trimester  -     ABO/Rh type and screen  -     Hepatitis B surface antigen  -     CBC with platelets  -     HIV Antigen Antibody Combo  -     Rubella Antibody IgG Quantitative  -     Treponema Abs w Reflex to RPR and Titer  -     Urine Culture Aerobic Bacterial  -     Hepatitis C antibody  -     [facility] Referral-Genetic Screening  Pregnancy  -     Hepatitis B Surface Antibody  -     Vitamin D Deficiency  -     AST  -     ALT  -     Uric acid  -     Protein  random urine with Creat Ratio    History of severe pre-eclampsia  -     ABO/Rh type and screen  -     Hepatitis B surface antigen  -     CBC with platelets  -     HIV Antigen Antibody Combo  -     Rubella Antibody  IgG Quantitative  -     Treponema Abs w Reflex to RPR and Titer  -     Urine Culture Aerobic Bacterial  -     Hepatitis C antibody  -     [facility] Referral-Genetic Screening  Pregnancy  -     Hepatitis B Surface Antibody  -     Vitamin D Deficiency  -     AST  -     ALT  -     Uric acid  -     Protein  random urine with Creat Ratio    Anxiety        30 year old  10w2d weeks of pregnancy with JENNYFER of 2021 by LMP of Patient's last menstrual period was 2021 (exact date).. Ultrasound confirms.   Outpatient Encounter Medications as of 2021   Medication Sig Dispense Refill     Prenatal Vit-Fe Fumarate-FA (PRENATAL MULTIVITAMIN W/IRON) 27-0.8 MG tablet Take 1 tablet by mouth daily       valACYclovir (VALTREX) 500 MG tablet Take 1 tablet (500 mg) by mouth daily 90 tablet 3     No facility-administered encounter medications on file as of 2021.       Orders Placed This Encounter   Procedures     Hepatitis B surface antigen     CBC with platelets     HIV Antigen Antibody Combo     Rubella Antibody IgG Quantitative     Treponema Abs w Reflex to RPR and Titer     Hepatitis C antibody     Hepatitis B Surface Antibody     Vitamin D Deficiency     AST     ALT     Uric acid     Protein  random urine with Creat Ratio     [facility] Referral-Genetic Screening  Pregnancy     ABO/Rh type and screen                 Orders Placed This Encounter   Procedures     Hepatitis B surface antigen     CBC with platelets     HIV Antigen Antibody Combo     Rubella Antibody IgG Quantitative     Treponema Abs w Reflex to RPR and Titer     Hepatitis C antibody     Hepatitis B Surface Antibody     Vitamin D Deficiency     AST     ALT     Uric acid     Protein  random urine with Creat Ratio     [facility] Referral-Genetic Screening  Pregnancy     ABO/Rh type and screen       - Oriented to Practice, types of care, and how to reach a provider.  Pt is open to either CNM or MD.  - Patient received 1st trimester new OB education packet complete  with aide of [employer] Expectant [employer] booklet including information on genetic screening test options.  - Patient desires NIPT and nuchal translucency. Orders placed and will f/u with MFM.   - Educational handout on the prevention of infections diseases during pregnancy provided.  - Patient was encouraged to continue prenatal vitamins as tolerated.    - Patient was sent to lab for routine OB labs as well as pre-eclampsia labs including as above.   - Reviewed risk for diabetes in pregnancy, plan for NOB visit.  - Reviewed recommendation for low dose aspirin daily to prevent pre eclampsia, pt agrees, follow up at NOB visit.   - Pregnancy concerns to be addressed by provider at new OB exam include: history of preeclampsia.    Pt to RTO for NOB visit in 4 weeks and prn if questions or concerns      Sherley Rodriguez MS-3

## 2021-04-29 NOTE — LETTER
2021       RE: Rach Peterson  [address]  [place] [address]     Dear Colleague,    Thank you for referring your patient, Rach Peterson, to the [facility] [place] [facility]. Please see a copy of my visit note below.    [facility] OB Intake note  Subjective   30 year old female presents to clinic for initiation of OB care. Patient's last menstrual period was 2021 (exact date).  at 10w2d by LMP Estimated Date of Delivery: 2021  US confirms. Reviewed dating ultrasound. Pregnancy is planned.    Partner name - Tim.       Symptoms since LMP include fatigue. Denies any abdominal pain or cramping, vaginal bleeding or spotting, nausea or vomiting. She has started a prenatal vitamin. Mentions history of pre-eclampsia and was on antihypertensives for approximately 4 months after delivery. Not currently taking any antihypertensives.     - Genetic/Infection questionnaire completed, risks include h/o genital herpes. She is taking valtrex. Pt  does not have a recent known exposure to Parvo or CMV so IgG/IgM testing WILL NOT be ordered.   Patient received flu vaccine in 2020. 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     Genital herpes 2011     Herpes simplex without mention of complication 2011     Hypertension  2020    Tied to pre-eclampsia, seems to have returned to normal     Knee pain, right      Syncope and collapse     Cardiology workup with Dr Brady, wandering atrial pacer, no activity restritction and no need for abx prophylaxis (mild pulm flow murmur)     - Risk for GDM : No known risk factors for GDMso  WILL NOT have an early GCT and Hgb A1C    - High risk factors for Pre E-  History of Pre Eclampsia       - Moderate risk factor for Pre E No moderate risk factors  Meets one high risk factor so WILL consider starting low dose aspirin (81mg) starting between 12 and 28 weeks to prevent early onset preeclampsia    - The patient  does not have a history of spontaneous  birth so  WILL NOT consider progesterone starting at 16-20 weeks and/or serial transvaginal cervical length ultrasounds from 16-24 weeks.     -The patient does not have a history of immunosuppression or HIV so Toxoplasma IgG/IgM WILL NOT be ordered.       PERSONAL/SOCIAL HISTORY  Patient is   Lives with their spouse and child.  Employment: Part time as a .  Job involves sedentary activity .  Her partner works as an .   History of anxiety. Not currently taking any medications but sees a therapist weekly and states this is going well.  Additional items: None    Objective  -VS: reviewed and within normal limits   -General appearance: no acute distress, patient is comfortable   NEUROLOGICAL/PSYCHIATRIC   - Orientated x3,   -Mood and affect: : normal     Assessment/Plan  Rach was seen today for prenatal care.    Diagnoses and all orders for this visit:    High-risk pregnancy, unspecified trimester  -     ABO/Rh type and screen  -     Hepatitis B surface antigen  -     CBC with platelets  -     HIV Antigen Antibody Combo  -     Rubella Antibody IgG Quantitative  -     Treponema Abs w Reflex to RPR and Titer  -     Urine Culture Aerobic Bacterial  -     Hepatitis C antibody  -     [facility] Referral-Genetic Screening   Pregnancy  -     Hepatitis B Surface Antibody  -     Vitamin D Deficiency  -     AST  -     ALT  -     Uric acid  -     Protein  random urine with Creat Ratio    History of severe pre-eclampsia  -     ABO/Rh type and screen  -     Hepatitis B surface antigen  -     CBC with platelets  -     HIV Antigen Antibody Combo  -     Rubella Antibody IgG Quantitative  -     Treponema Abs w Reflex to RPR and Titer  -     Urine Culture Aerobic Bacterial  -     Hepatitis C antibody  -     [facility] Referral-Genetic Screening  Pregnancy  -     Hepatitis B Surface Antibody  -     Vitamin D Deficiency  -     AST  -     ALT  -     Uric acid  -     Protein  random urine with Creat Ratio    Anxiety        30 year old  10w2d weeks of pregnancy with JENNYFER of 2021 by LMP of Patient's last menstrual period was 2021 (exact date).. Ultrasound confirms.   Outpatient Encounter Medications as of 2021   Medication Sig Dispense Refill     Prenatal Vit-Fe Fumarate-FA (PRENATAL MULTIVITAMIN W/IRON) 27-0.8 MG tablet Take 1 tablet by mouth daily       valACYclovir (VALTREX) 500 MG tablet Take 1 tablet (500 mg) by mouth daily 90 tablet 3     No facility-administered encounter medications on file as of 2021.       Orders Placed This Encounter   Procedures     Hepatitis B surface antigen     CBC with platelets     HIV Antigen Antibody Combo     Rubella Antibody IgG Quantitative     Treponema Abs w Reflex to RPR and Titer     Hepatitis C antibody     Hepatitis B Surface Antibody     Vitamin D Deficiency     AST     ALT     Uric acid     Protein  random urine with Creat Ratio     [facility] Referral-Genetic Screening  Pregnancy     ABO/Rh type and screen                 Orders Placed This Encounter   Procedures     Hepatitis B surface antigen     CBC with platelets     HIV Antigen Antibody Combo     Rubella Antibody IgG Quantitative     Treponema Abs w Reflex to RPR and Titer     Hepatitis C antibody     Hepatitis B Surface Antibody      Vitamin D Deficiency     AST     ALT     Uric acid     Protein  random urine with Creat Ratio     [facility] Referral-Genetic Screening  Pregnancy     ABO/Rh type and screen       - Oriented to Practice, types of care, and how to reach a provider.  Pt is open to either CNM or MD.  - Patient received 1st trimester new OB education packet complete with aide of The Expectant Family booklet including information on genetic screening test options.  - Patient desires NIPT and nuchal translucency. Orders placed and will f/u with MFM.   - Educational handout on the prevention of infections diseases during pregnancy provided.  - Patient was encouraged to continue prenatal vitamins as tolerated.    - Patient was sent to lab for routine OB labs as well as pre-eclampsia labs including as above.   - Reviewed risk for diabetes in pregnancy, plan for NOB visit.  - Reviewed recommendation for low dose aspirin daily to prevent pre eclampsia, pt agrees, follow up at NOB visit.   - Pregnancy concerns to be addressed by provider at new OB exam include: history of preeclampsia.    Pt to RTO for NOB visit in 4 weeks and prn if questions or concerns      Sherley Rodriguez MS-3

## 2021-04-29 NOTE — NURSING NOTE
Chief Complaint   Patient presents with     Prenatal Care     Intake  JENNYFER 11/23/2021   Claudine Lock LPN

## 2021-04-30 LAB — RUBV IGG SERPL IA-ACNC: 15 IU/ML

## 2021-05-01 PROBLEM — B95.1 POSITIVE GBS TEST: Status: ACTIVE | Noted: 2021-05-01

## 2021-05-01 LAB
BACTERIA SPEC CULT: ABNORMAL
BACTERIA SPEC CULT: ABNORMAL
Lab: ABNORMAL
SPECIMEN SOURCE: ABNORMAL

## 2021-05-19 ENCOUNTER — PRE VISIT (OUTPATIENT)
Dept: MATERNAL FETAL MEDICINE | Facility: CLINIC | Age: 31
End: 2021-05-19

## 2021-05-21 ENCOUNTER — HOSPITAL ENCOUNTER (OUTPATIENT)
Dept: ULTRASOUND IMAGING | Facility: CLINIC | Age: 31
End: 2021-05-21
Attending: ADVANCED PRACTICE MIDWIFE
Payer: COMMERCIAL

## 2021-05-21 ENCOUNTER — OFFICE VISIT (OUTPATIENT)
Dept: MATERNAL FETAL MEDICINE | Facility: CLINIC | Age: 31
End: 2021-05-21
Attending: ADVANCED PRACTICE MIDWIFE
Payer: COMMERCIAL

## 2021-05-21 DIAGNOSIS — O26.90 PREGNANCY RELATED CONDITION, ANTEPARTUM: ICD-10-CM

## 2021-05-21 DIAGNOSIS — Z3A.13 13 WEEKS GESTATION OF PREGNANCY: ICD-10-CM

## 2021-05-21 DIAGNOSIS — Z82.79 FAMILY HISTORY OF BIRTH DEFECT: Primary | ICD-10-CM

## 2021-05-21 DIAGNOSIS — Z36.9 ENCOUNTER FOR ANTENATAL SCREENING: ICD-10-CM

## 2021-05-21 DIAGNOSIS — Z36.9 ENCOUNTER FOR ANTENATAL SCREENING: Primary | ICD-10-CM

## 2021-05-21 DIAGNOSIS — Z82.79 FAMILY HISTORY OF BIRTH DEFECT: ICD-10-CM

## 2021-05-21 LAB — B-HCG SERPL-ACNC: ABNORMAL IU/L (ref 0–5)

## 2021-05-21 PROCEDURE — 76813 OB US NUCHAL MEAS 1 GEST: CPT | Mod: 26 | Performed by: OBSTETRICS & GYNECOLOGY

## 2021-05-21 PROCEDURE — 84702 CHORIONIC GONADOTROPIN TEST: CPT | Performed by: OBSTETRICS & GYNECOLOGY

## 2021-05-21 PROCEDURE — 36415 COLL VENOUS BLD VENIPUNCTURE: CPT | Performed by: OBSTETRICS & GYNECOLOGY

## 2021-05-21 PROCEDURE — 999N001100 HC STATISTIC VERIFI PRENATAL TRISOMY 21,18,13: Performed by: OBSTETRICS & GYNECOLOGY

## 2021-05-21 PROCEDURE — 76813 OB US NUCHAL MEAS 1 GEST: CPT

## 2021-05-21 PROCEDURE — 999N000069 HC STATISTIC GENETIC COUNSELING, < 16 MIN: Performed by: GENETIC COUNSELOR, MS

## 2021-05-21 PROCEDURE — 999N001121 HC STATISTIC RESEARCH KIT COLLECTION: Performed by: OBSTETRICS & GYNECOLOGY

## 2021-05-21 NOTE — PROGRESS NOTES
[facility]  Genetic Counseling Consult    Patient: Rach Peterson YOB: 1990   Date of Service: 21      Rach Peterson was seen at [facility] for genetic consultation to discuss the options for routine screening for fetal chromosome abnormalities. The indication for genetic counseling is routine screening for aneuploidy and prior pregnancy with omphalocele. The patient was accompanied by her partner, Tim to today's visit.       Impression/Plan:   1.  Rach had an ultrasound and blood draw for NIPT (Innatal test through [facility]).  Results are expected within 7 days, and will be available in [facility].  We will contact her to discuss the results, and a copy will be forwarded to the office of the referring OB provider. Rach requested that we contact her partner, Tim with results. He provided verbal permission for results to be left in his voicemail and they requested the results include predicted fetal sex information. Consent to communicate is on file.     2. Maternal serum AFP (single marker screen) is recommended after 15 weeks to screen for open neural tube defects. A quad screen should not be performed.    3. Plan for level II comprehensive ultrasound given prior pregnancy with omphalocele.     Pregnancy History:   /Parity:    Age at Delivery: 31 year old  JENNYFER: 2021, Alternate JENNYFER Entry  Gestational Age: 13w3d    No significant complications or exposures were reported in the current pregnancy.    Rach denton pregnancy history is significant for one TAB at 15 weeks, pregnancy complicated by an omphalocele. POC SNP array and chromosomes were WNL. Her pregnancy history is also significant for one SAB and one term delivery of a healthy girl. The most recent delivery was complicated by hypertension at time of delivery and post partum for five months requiring medication. She is no longer  taking medication, however is on baby ASA in this pregnancy. We reviewed that the genetic testing performed in their prior pregnancy did not reveal and underlying cause for the omphalocele, however did rule out chromosomal aneuploidy and copy number variants. We cannot completely rule out all genetic conditions though recurrence risk is likely low. We reviewed plan for level II comprehensive ultrasound as a screening tool for birth defects in the current pregnancy.     Medical History:   Rach s reported medical history is not expected to impact pregnancy management or risks to fetal development.       Family History:   A three-generation pedigree was previously obtained, please see previous genetic counseling notes. The family history was updated today and is scanned under the  Media  tab.     No new significant findings were reported by Rach. Otherwise, the reported family history is negative for multiple miscarriages, stillbirths, birth defects, intellectual disability, known genetic conditions, and consanguinity.       Carrier Screening:   The patient reports that she and the father of the pregnancy have  ancestry:     Cystic fibrosis is an autosomal recessive genetic condition that occurs with increased frequency in individuals of  ancestry and carrier screening for this condition is available.  In addition,  screening in the [facility] includes cystic fibrosis.      Expanded carrier screening for mutations in a large panel of genes associated with autosomal recessive conditions including cystic fibrosis, spinal muscular atrophy, and others, is now available.      The patient has declined the carrier screening options reviewed today.       Risk Assessment for Chromosome Conditions:   We explained that the risk for fetal chromosome abnormalities increases with maternal age. We discussed specific features of common chromosome abnormalities, including Down syndrome, trisomy 13,  trisomy 18, and sex chromosome trisomies.      - At age 31 at midtrimester, the risk to have a baby with Down syndrome is 1 in 690.     - At age 31 at midtrimester, the risk to have a baby with any chromosome abnormality is 1 in 345.       Testing Options:   We discussed the following options:   First trimester screening    First trimester ultrasound with nuchal translucency and nasal bone assessments, maternal plasma hCG, HUNTER-A, and AFP measurement    Screens for fetal trisomy 21, trisomy 13, and trisomy 18    Cannot screen for open neural tube defects; maternal serum AFP after 15 weeks is recommended     Non-invasive Prenatal Testing (NIPT)    Maternal plasma cell-free DNA testing; first trimester ultrasound with nuchal translucency and nasal bone assessment is recommended, when appropriate    Screens for fetal trisomy 21, trisomy 13, trisomy 18, and sex chromosome aneuploidy    Cannot screen for open neural tube defects; maternal serum AFP after 15 weeks is recommended     Chorionic villus sampling (CVS)    Invasive procedure typically performed in the first trimester by which placental villi are obtained for the purpose of chromosome analysis and/or other prenatal genetic analysis    Diagnostic results; >99% sensitivity for fetal chromosome abnormalities    Cannot test for open neural tube defects; maternal serum AFP after 15 weeks is recommended     Genetic Amniocentesis    Invasive procedure typically performed in the second trimester by which amniotic fluid is obtained for the purpose of chromosome analysis and/or other prenatal genetic analysis    Diagnostic results; >99% sensitivity for fetal chromosome abnormalities    AFAFP measurement tests for open neural tube defects     Comprehensive (Level II) ultrasound: Detailed ultrasound performed between 18-22 weeks gestation to screen for major birth defects and markers for aneuploidy.      We reviewed the benefits and limitations of this testing.  Screening  tests provide a risk assessment specific to the pregnancy for certain fetal chromosome abnormalities, but cannot definitively diagnose or exclude a fetal chromosome abnormality.  Follow-up genetic counseling and consideration of diagnostic testing is recommended with any abnormal screening result.      It was a pleasure to be involved with Rach s care. Face-to-face time of the meeting was 15 minutes.    Gita Felix MS, [facility]  Licensed Genetic Counselor  [facility]  Maternal Fetal Medicine  Ph: [phone]  [email or web address]

## 2021-05-21 NOTE — PROGRESS NOTES
Please see ultrasound report under Imaging tab for details of today's ultrasound.    Nicci Dillard MD  Maternal-Fetal Medicine

## 2021-05-22 LAB — RESEARCH KIT COLLECTION: NORMAL

## 2021-05-22 ASSESSMENT — ANXIETY QUESTIONNAIRES
5. BEING SO RESTLESS THAT IT IS HARD TO SIT STILL: NOT AT ALL
7. FEELING AFRAID AS IF SOMETHING AWFUL MIGHT HAPPEN: SEVERAL DAYS
GAD7 TOTAL SCORE: 3
3. WORRYING TOO MUCH ABOUT DIFFERENT THINGS: NOT AT ALL
2. NOT BEING ABLE TO STOP OR CONTROL WORRYING: NOT AT ALL
1. FEELING NERVOUS, ANXIOUS, OR ON EDGE: SEVERAL DAYS
7. FEELING AFRAID AS IF SOMETHING AWFUL MIGHT HAPPEN: SEVERAL DAYS
4. TROUBLE RELAXING: NOT AT ALL
GAD7 TOTAL SCORE: 3
6. BECOMING EASILY ANNOYED OR IRRITABLE: SEVERAL DAYS

## 2021-05-23 ASSESSMENT — ANXIETY QUESTIONNAIRES: GAD7 TOTAL SCORE: 3

## 2021-05-26 ENCOUNTER — TELEPHONE (OUTPATIENT)
Dept: MATERNAL FETAL MEDICINE | Facility: CLINIC | Age: 31
End: 2021-05-26

## 2021-05-26 PROBLEM — R82.71 GBS BACTERIURIA: Status: ACTIVE | Noted: 2021-05-01

## 2021-05-26 LAB — LAB SCANNED RESULT: NORMAL

## 2021-05-26 NOTE — TELEPHONE ENCOUNTER
May 26, 2021  Left a message for Rcah and Tim regarding her NIPT results.     Results indicate NO ANEUPLOIDY DETECTED for chromosomes 21, 18, 13, or sex chromosomes (XX).     This puts her current pregnancy at low risk for Down syndrome, trisomy 18, trisomy 13 and sex chromosome abnormalities. This test is reported to have the following sensitivities: Down syndrome: 99.2%, trisomy 18: >99.9%, and trisomy 13: >99.9%. Although these results are reassuring, this does not replace a standard chromosome analysis from a chorionic villus sampling or amniocentesis.     MSAFP is the appropriate second trimester screening test for open neural tube defects; the maternal quad screen is not recommended.    Her results are available in her Epic chart for her primary OB to review.    This information was left in Tim's voicemail per plan established at the couple's genetic counseling appointment, and they were encouraged to reach out with any questions or concerns. Consent to communicate is on file.      Gita Felix MS, [facility]  Licensed Genetic Counselor  [facility]  Maternal Fetal Medicine  Ph: [phone]  [email or web address]

## 2021-05-27 NOTE — PROGRESS NOTES
SUBJECTIVE:   Rach is a 30 year old female who presents to clinic for a new OB visit.   at 14w3d with Estimated Date of Delivery: 2021 based on LMP c/w [id number]. Feels well. Has  started PNV.     She has not had bleeding since her LMP.   She has had mild nausea. Weight loss has not occurred.   This was a planned pregnancy.   Partner Tim is involved       OTHER CONCERNS:     - Low risk NT.  Low risk NIPT.   Offered MSAFP 15-20 weeks, pt agreeable - plans to have drawn same day that    Level II scheduled 21 - will be 18w3d which is within valid gestational age range.   - Pap NIL 2020.  No noted history of abnormal.  Plan repeat Pap in 3 years per [facility] .  As will be over 30 plan HPV co testing  - Pt declines breast and pelvic exam. Is agreeable to follow up if concerns or questions arise.  - Her only real concern right now is Is taking her daily 81mg ASA as prescribed.  Has a BP cuff at home, unsure if it's been calibrated.  Agreeable to bring to next visit.    - COVID vaccine series complete!  - history of HSV - no symptoms at present,.     ===========================================   ROS: 10 point ROS neg other than the symptoms noted above in the HPI.      PSYCHIATRIC:  Denies mood changes, depression, anxiety or panic attacks.  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   She has a history of  preeclampsia  Since her last LMP she denies use of alcohol, tobacco and street drugs.  HISTORY:  Family History   Problem Relation Age of Onset     Hypertension Mother      No Known Problems Father      No Known Problems Brother      No Known Problems Maternal Grandmother      No Known Problems Maternal Grandfather      No Known Problems Paternal Grandmother      No Known Problems Paternal Grandfather      Cancer Maternal Aunt         Melanoma     Social History     Socioeconomic History     Marital status:      Spouse name: Russell     Number of children: 1     Years of education: Not on file     Highest education level: Not on file   Occupational History     Occupation:      Employer: [employer]     Comment: fulltime   Social Needs     Financial resource strain: Not on file     Food insecurity     Worry: Not on file     Inability: Not on file     Transportation needs     Medical: Not on file     Non-medical: Not on file   Tobacco Use     Smoking status: Never Smoker     Smokeless tobacco: Never Used     Tobacco comment: Non smoking home   Substance and Sexual Activity     Alcohol use: Not Currently     Comment: four drinks once every two weeks.     Drug use: Never     Sexual activity: Yes     Partners: Male     Birth control/protection: Condom   Lifestyle     Physical activity     Days per week: Not on file     Minutes per session: Not on file     Stress: Not on file   Relationships     Social connections     Talks on phone: Not on file     Gets together: Not on file     Attends Baptism service: Not on file     Active member of club or organization: Not on file     Attends meetings of clubs or organizations: Not on file     Relationship status: Not on file     Intimate partner violence     Fear of current or ex partner: Not on file     Emotionally abused: Not on  file     Physically abused: Not on file     Forced sexual activity: Not on file   Other Topics Concern     Parent/sibling w/ CABG, MI or angioplasty before 65F 55M? No   Social History Narrative     Not on file     Current Outpatient Medications   Medication Sig     aspirin (ASPIRIN ADULT LOW DOSE) 81 MG EC tablet 81 mg daily     Prenatal Vit-Fe Fumarate-FA (PRENATAL MULTIVITAMIN W/IRON) 27-0.8 MG tablet Take 1 tablet by mouth daily     valACYclovir (VALTREX) 500 MG tablet Take 1 tablet (500 mg) by mouth daily     No current facility-administered medications for this visit.      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       OB History    Para Term  AB Living   4 1 1 0 2 1   SAB TAB Ectopic Multiple Live Births   1 1 0 0 1      # Outcome Date GA Lbr Tawanda/2nd Weight Sex Delivery Anes PTL Lv   4 Current            3 Term 20 40w1d 03:39 / 00:29 3.63 kg (8 lb) F Vag-Spont Local N XAVIER      Complications: GBS, Preeclampsia/Hypertension      Name: DARIA CARROLL-DORITA      Apgar1: 9  Apgar5: 9   2 SAB 10/04/19           1 TAB 19 15w6d       FD      Birth Comments: 15 week  "D&E for fetal omphalocele         GYN History- no history of  Abnormal Pap Smears                        Cervical procedures: None                        History of STI: HSV     I personally reviewed the past social/family/medical and surgical history on the date of service.   I reviewed lab work done at Intake visit with patient.    EXAM:  /78   Pulse 97   Ht 1.778 m (5' 10\")   Wt 66.5 kg (146 lb 9.6 oz)   LMP 2021 (Exact Date)   BMI 21.03 kg/m     EXAM:  GENERAL:  Pleasant pregnant female, alert, cooperative and well groomed.  SKIN:  Warm and dry, without lesions or rashes  HEAD: Symmetrical features.  MOUTH:Wearing a mask.   NECK:  Thyroid without enlargement and nodules.  Lymph nodes not palpable.   LUNGS:  Clear to auscultation.  BREAST:    Deferred per pt request.  No worrisome s/s reported. No strong family history of breast cancer.   HEART:  RRR without murmur.  ABDOMEN: Soft without masses , tenderness or organomegaly.  No CVA tenderness.  Uterus palpable at size equal to dates.  No scars noted.. Fetal heart tones present.  MUSCULOSKELETAL:  Full range of motion  EXTREMITIES:  No edema. No significant varicosities.   PELVIC EXAM: Defered per pt request.  Pap up to date.  No worrisome s/s.  Documented normal anatomy with previous pregnancies.     GC/CHLAMYDIA CULTURE OBTAINED:YES    Lab Results   Component Value Date    PAP NIL 2020    PAP NIL 2017    PAP NIL 2014        Recommended COVID Vaccine.  Patient already received Flu Vaccine  2020      ASSESSMENT:  30 year old , 14w3d weeks of pregnancy with JENNYFER of 2021 by LMP c/w 10 week US.   Intrauterine pregnancy 14w3d size is consistent with dates  Genetic Screening: Low risk NIPT and NT.  Has level II scheduled.  Agreeable to  MSAFP, plans same day as Level II.       ICD-10-CM    1. High-risk pregnancy, unspecified trimester  O09.90 Gonorrhorea by PCR     Chlamydia PCR (Clinic Collect)     AFP - Alpha " Fetoprotein Maternal Screen   2. History of severe pre-eclampsia  Z87.59    3. GBS bacteriuria  R82.71    4. HSV (herpes simplex virus) infection  B00.9    5. Genetic screening  Z13.79 AFP - Alpha Fetoprotein Maternal Screen       PLAN:  - Reviewed use of triage nurse line and contacting the on-call provider after hours for an urgent need such as fever, vagina bleeding, bladder or vaginal infection, rupture of membranes,  or term labor.    - Reviewed best evidence for: weight gain for her weight and height for pregnancy:  Based on pre-pregnancy Body mass index is 21.03 kg/m . RECOMMENDED WEIGHT GAIN: 25-35 lbs.    - Reviewed healthy diet and foods to avoid, exercise and activity during pregnancy; avoiding exposure to toxoplasmosis; and maintenance of a generally healthy lifestyle.   - Discussed the harms, benefits, side effects and alternative therapies for current prescribed and OTC medications.    - Reviewed High risk for Pre Eclampsia d/t History of Pre Eclampsia  and IS already taking 81mg aspirin daily after 12 weeks .  - Has a BP cuff at home, unsure if it's been calibrated.  Agreeable to bring to next visit.    - Offered MSAFP 15-20 weeks, pt agreeable - plans to have drawn same day that    Level II scheduled 21 - will be 18w3d which is within valid gestational age range.   - Next pap with HPV co testing due     - All pt's questions discussed and answered.  Pt verbalized understanding of and agreement to plan of care.     - Continue scheduled prenatal care and prn if questions or concerns    GUANAKO Andrews CNNAEEM          Answers for HPI/ROS submitted by the patient on 2021   MYNOR 7 TOTAL SCORE: 3  General Symptoms: No  Skin Symptoms: No  HENT Symptoms: No  EYE SYMPTOMS: No  HEART SYMPTOMS: No  LUNG SYMPTOMS: No  INTESTINAL SYMPTOMS: No  URINARY SYMPTOMS: No  GYNECOLOGIC SYMPTOMS: No  BREAST SYMPTOMS: No  SKELETAL SYMPTOMS: No  BLOOD SYMPTOMS: No  NERVOUS SYSTEM SYMPTOMS: No  MENTAL  HEALTH SYMPTOMS: No

## 2021-05-28 ENCOUNTER — OFFICE VISIT (OUTPATIENT)
Dept: OBGYN | Facility: CLINIC | Age: 31
End: 2021-05-28
Attending: ADVANCED PRACTICE MIDWIFE
Payer: COMMERCIAL

## 2021-05-28 VITALS
BODY MASS INDEX: 20.99 KG/M2 | DIASTOLIC BLOOD PRESSURE: 78 MMHG | WEIGHT: 146.6 LBS | HEIGHT: 70 IN | SYSTOLIC BLOOD PRESSURE: 114 MMHG | HEART RATE: 97 BPM

## 2021-05-28 DIAGNOSIS — B00.9 HSV (HERPES SIMPLEX VIRUS) INFECTION: ICD-10-CM

## 2021-05-28 DIAGNOSIS — Z13.79 GENETIC SCREENING: ICD-10-CM

## 2021-05-28 DIAGNOSIS — R82.71 GBS BACTERIURIA: ICD-10-CM

## 2021-05-28 DIAGNOSIS — Z87.59 HISTORY OF SEVERE PRE-ECLAMPSIA: ICD-10-CM

## 2021-05-28 DIAGNOSIS — O09.90 HIGH-RISK PREGNANCY, UNSPECIFIED TRIMESTER: Primary | ICD-10-CM

## 2021-05-28 PROCEDURE — G0463 HOSPITAL OUTPT CLINIC VISIT: HCPCS

## 2021-05-28 PROCEDURE — 87491 CHLMYD TRACH DNA AMP PROBE: CPT | Performed by: ADVANCED PRACTICE MIDWIFE

## 2021-05-28 PROCEDURE — 87591 N.GONORRHOEAE DNA AMP PROB: CPT | Performed by: ADVANCED PRACTICE MIDWIFE

## 2021-05-28 PROCEDURE — 99207 PR PRENATAL VISIT: CPT | Performed by: ADVANCED PRACTICE MIDWIFE

## 2021-05-28 ASSESSMENT — MIFFLIN-ST. JEOR: SCORE: 1470.22

## 2021-05-28 ASSESSMENT — PAIN SCALES - GENERAL: PAINLEVEL: NO PAIN (0)

## 2021-05-28 NOTE — LETTER
2021       RE: Rach Peterson   [address] [place] [address]     Dear Colleague,    Thank you for referring your patient, Rach Peterson, to the [facility] [place] at [facility]. Please see a copy of my visit note below.    SUBJECTIVE:   Rach is a 30 year old female who presents to clinic for a new OB visit.   at 14w3d with Estimated Date of Delivery: 2021 based on LMP c/w [id number]. Feels well. Has  started PNV.     She has not had bleeding since her LMP.   She has had mild nausea. Weight loss has not occurred.   This was a planned pregnancy.   Partner Tim is involved       OTHER CONCERNS:     - Low risk NT.  Low risk NIPT.   Offered MSAFP 15-20 weeks, pt agreeable - plans to have drawn same day that    Level II scheduled 21 - will be 18w3d which is within valid gestational age range.   - Pap NIL 2020.  No noted history of abnormal.  Plan repeat Pap in 3 years per [facility] .  As will be over 31 plan HPV co testing  - Pt declines breast and pelvic exam. Is agreeable to follow up if concerns or questions arise.  - Her only real concern right now is Is taking her daily 81mg ASA as prescribed.  Has a BP cuff at home, unsure if it's been calibrated.  Agreeable to bring to next visit.    - COVID vaccine series complete!  - history of HSV - no symptoms at present,.     ===========================================   ROS: 10 point ROS neg other than the symptoms noted above in the HPI.      PSYCHIATRIC:  Denies mood changes, depression, anxiety or panic attacks.  Has History of anxiety    PHQ-9 score:    PHQ-9 SCORE 2020   PHQ-9 Total Score 2           MYNOR-7 SCORE 4/15/2021 2021 2021   Total Score - 5 (mild anxiety) 3 (minimal anxiety)   Total Score 5 5 3         Past History:  Her past medical history   Past Medical History:   Diagnosis Date     Anxiety     as adult, therapy, no meds  or hosp     Carrier of group B Streptococcus 06/1/2020     Genital herpes 12/1/2011     Herpes simplex without mention of complication 12/1/2011     Hypertension 07/11/2020    Tied to pre-eclampsia, seems to have returned to normal     Knee pain, right      Syncope and collapse 2006    Cardiology workup with Dr Brady, wandering atrial pacer, no activity restritction and no need for abx prophylaxis (mild pulm flow murmur)   .   She has a history of  preeclampsia  Since her last LMP she denies use of alcohol, tobacco and street drugs.  HISTORY:  Family History   Problem Relation Age of Onset     Hypertension Mother      No Known Problems Father      No Known Problems Brother      No Known Problems Maternal Grandmother      No Known Problems Maternal Grandfather      No Known Problems Paternal Grandmother      No Known Problems Paternal Grandfather      Cancer Maternal Aunt         Melanoma     Social History     Socioeconomic History     Marital status:      Spouse name: Russell     Number of children: 1     Years of education: Not on file     Highest education level: Not on file   Occupational History     Occupation:      Employer: [employer]     Comment: fulltime   Social Needs     Financial resource strain: Not on file     Food insecurity     Worry: Not on file     Inability: Not on file     Transportation needs     Medical: Not on file     Non-medical: Not on file   Tobacco Use     Smoking status: Never Smoker     Smokeless tobacco: Never Used     Tobacco comment: Non smoking home   Substance and Sexual Activity     Alcohol use: Not Currently     Comment: four drinks once every two weeks.     Drug use: Never     Sexual activity: Yes     Partners: Male     Birth control/protection: Condom   Lifestyle     Physical activity     Days per week: Not on file     Minutes per session: Not on file     Stress: Not on file   Relationships     Social connections     Talks on phone: Not on file      Gets together: Not on file     Attends Anabaptist service: Not on file     Active member of club or organization: Not on file     Attends meetings of clubs or organizations: Not on file     Relationship status: Not on file     Intimate partner violence     Fear of current or ex partner: Not on file     Emotionally abused: Not on file     Physically abused: Not on file     Forced sexual activity: Not on file   Other Topics Concern     Parent/sibling w/ CABG, MI or angioplasty before 65F 55M? No   Social History Narrative     Not on file     Current Outpatient Medications   Medication Sig     aspirin (ASPIRIN ADULT LOW DOSE) 81 MG EC tablet 81 mg daily     Prenatal Vit-Fe Fumarate-FA (PRENATAL MULTIVITAMIN W/IRON) 27-0.8 MG tablet Take 1 tablet by mouth daily     valACYclovir (VALTREX) 500 MG tablet Take 1 tablet (500 mg) by mouth daily     No current facility-administered medications for this visit.      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       OB History    Para Term  AB Living   4 1 1 0 2 1   SAB TAB Ectopic Multiple Live Births   1 1 0 0 1      # Outcome Date GA Lbr  "Tawanda/2nd Weight Sex Delivery Anes PTL Lv   4 Current            3 Term 20 40w1d 03:39 / 00:29 3.63 kg (8 lb) F Vag-Spont Local N XAVIER      Complications: GBS, Preeclampsia/Hypertension      Name: DARIA CARROLL-DORITA      Apgar1: 9  Apgar5: 9   2 SAB 10/04/19           1 TAB 19 15w6d       FD      Birth Comments: 15 week D&E for fetal omphalocele         GYN History- no history of  Abnormal Pap Smears                        Cervical procedures: None                        History of STI: HSV     I personally reviewed the past social/family/medical and surgical history on the date of service.   I reviewed lab work done at Intake visit with patient.    EXAM:  /78   Pulse 97   Ht 1.778 m (5' 10\")   Wt 66.5 kg (146 lb 9.6 oz)   LMP 2021 (Exact Date)   BMI 21.03 kg/m     EXAM:  GENERAL:  Pleasant pregnant female, alert, cooperative and well groomed.  SKIN:  Warm and dry, without lesions or rashes  HEAD: Symmetrical features.  MOUTH:Wearing a mask.   NECK:  Thyroid without enlargement and nodules.  Lymph nodes not palpable.   LUNGS:  Clear to auscultation.  BREAST:    Deferred per pt request.  No worrisome s/s reported. No strong family history of breast cancer.   HEART:  RRR without murmur.  ABDOMEN: Soft without masses , tenderness or organomegaly.  No CVA tenderness.  Uterus palpable at size equal to dates.  No scars noted.. Fetal heart tones present.  MUSCULOSKELETAL:  Full range of motion  EXTREMITIES:  No edema. No significant varicosities.   PELVIC EXAM: Defered per pt request.  Pap up to date.  No worrisome s/s.  Documented normal anatomy with previous pregnancies.     GC/CHLAMYDIA CULTURE OBTAINED:YES    Lab Results   Component Value Date    PAP NIL 2020    PAP NIL 2017    PAP NIL 2014        Recommended COVID Vaccine.  Patient already received Flu Vaccine  2020      ASSESSMENT:  30 year old , 14w3d weeks of pregnancy with JENNYFER of 2021 by LMP c/w 10 " week US.   Intrauterine pregnancy 14w3d size is consistent with dates  Genetic Screening: Low risk NIPT and NT.  Has level II scheduled.  Agreeable to  MSAFP, plans same day as Level II.       ICD-10-CM    1. High-risk pregnancy, unspecified trimester  O09.90 Gonorrhorea by PCR     Chlamydia PCR (Clinic Collect)     AFP - Alpha Fetoprotein Maternal Screen   2. History of severe pre-eclampsia  Z87.59    3. GBS bacteriuria  R82.71    4. HSV (herpes simplex virus) infection  B00.9    5. Genetic screening  Z13.79 AFP - Alpha Fetoprotein Maternal Screen       PLAN:  - Reviewed use of triage nurse line and contacting the on-call provider after hours for an urgent need such as fever, vagina bleeding, bladder or vaginal infection, rupture of membranes,  or term labor.    - Reviewed best evidence for: weight gain for her weight and height for pregnancy:  Based on pre-pregnancy Body mass index is 21.03 kg/m . RECOMMENDED WEIGHT GAIN: 25-35 lbs.    - Reviewed healthy diet and foods to avoid, exercise and activity during pregnancy; avoiding exposure to toxoplasmosis; and maintenance of a generally healthy lifestyle.   - Discussed the harms, benefits, side effects and alternative therapies for current prescribed and OTC medications.    - Reviewed High risk for Pre Eclampsia d/t History of Pre Eclampsia  and IS already taking 81mg aspirin daily after 12 weeks .  - Has a BP cuff at home, unsure if it's been calibrated.  Agreeable to bring to next visit.    - Offered MSAFP 15-20 weeks, pt agreeable - plans to have drawn same day that    Level II scheduled 21 - will be 18w3d which is within valid gestational age range.   - Next pap with HPV co testing due     - All pt's questions discussed and answered.  Pt verbalized understanding of and agreement to plan of care.     - Continue scheduled prenatal care and prn if questions or concerns    Denise Estes, GUANAKO CNM          Answers for HPI/ROS submitted by the patient  on 5/22/2021   MYNOR 7 TOTAL SCORE: 3  General Symptoms: No  Skin Symptoms: No  HENT Symptoms: No  EYE SYMPTOMS: No  HEART SYMPTOMS: No  LUNG SYMPTOMS: No  INTESTINAL SYMPTOMS: No  URINARY SYMPTOMS: No  GYNECOLOGIC SYMPTOMS: No  BREAST SYMPTOMS: No  SKELETAL SYMPTOMS: No  BLOOD SYMPTOMS: No  NERVOUS SYSTEM SYMPTOMS: No  MENTAL HEALTH SYMPTOMS: No

## 2021-06-06 ENCOUNTER — MYC MEDICAL ADVICE (OUTPATIENT)
Dept: OBGYN | Facility: CLINIC | Age: 31
End: 2021-06-06

## 2021-06-07 NOTE — TELEPHONE ENCOUNTER
la paperwork completed signed copied and emailed back to patient-  Phone call for patient to confirm that paperwork was received.  Suzanne has a copy at desk.

## 2021-06-25 ENCOUNTER — HOSPITAL ENCOUNTER (OUTPATIENT)
Dept: ULTRASOUND IMAGING | Facility: CLINIC | Age: 31
End: 2021-06-25
Attending: OBSTETRICS & GYNECOLOGY
Payer: COMMERCIAL

## 2021-06-25 ENCOUNTER — OFFICE VISIT (OUTPATIENT)
Dept: MATERNAL FETAL MEDICINE | Facility: CLINIC | Age: 31
End: 2021-06-25
Attending: OBSTETRICS & GYNECOLOGY
Payer: COMMERCIAL

## 2021-06-25 DIAGNOSIS — Z82.79 FAMILY HISTORY OF BIRTH DEFECT: Primary | ICD-10-CM

## 2021-06-25 DIAGNOSIS — Z82.79 FAMILY HISTORY OF BIRTH DEFECT: ICD-10-CM

## 2021-06-25 PROCEDURE — 76811 OB US DETAILED SNGL FETUS: CPT | Mod: 26 | Performed by: OBSTETRICS & GYNECOLOGY

## 2021-06-25 PROCEDURE — 76811 OB US DETAILED SNGL FETUS: CPT

## 2021-06-25 NOTE — PROGRESS NOTES
"Please see \"Imaging\" tab under \"Chart Review\" for details of today's US at the [facility].    Jeremy Larson MD  Maternal-Fetal Medicine      "

## 2021-07-18 ASSESSMENT — ANXIETY QUESTIONNAIRES
3. WORRYING TOO MUCH ABOUT DIFFERENT THINGS: SEVERAL DAYS
7. FEELING AFRAID AS IF SOMETHING AWFUL MIGHT HAPPEN: SEVERAL DAYS
7. FEELING AFRAID AS IF SOMETHING AWFUL MIGHT HAPPEN: SEVERAL DAYS
GAD7 TOTAL SCORE: 6
8. IF YOU CHECKED OFF ANY PROBLEMS, HOW DIFFICULT HAVE THESE MADE IT FOR YOU TO DO YOUR WORK, TAKE CARE OF THINGS AT HOME, OR GET ALONG WITH OTHER PEOPLE?: NOT DIFFICULT AT ALL
4. TROUBLE RELAXING: SEVERAL DAYS
GAD7 TOTAL SCORE: 6
6. BECOMING EASILY ANNOYED OR IRRITABLE: SEVERAL DAYS
GAD7 TOTAL SCORE: 6
5. BEING SO RESTLESS THAT IT IS HARD TO SIT STILL: NOT AT ALL
2. NOT BEING ABLE TO STOP OR CONTROL WORRYING: SEVERAL DAYS
1. FEELING NERVOUS, ANXIOUS, OR ON EDGE: SEVERAL DAYS

## 2021-07-19 ASSESSMENT — ANXIETY QUESTIONNAIRES: GAD7 TOTAL SCORE: 6

## 2021-07-23 ENCOUNTER — OFFICE VISIT (OUTPATIENT)
Dept: OBGYN | Facility: CLINIC | Age: 31
End: 2021-07-23
Attending: OBSTETRICS & GYNECOLOGY
Payer: COMMERCIAL

## 2021-07-23 VITALS
SYSTOLIC BLOOD PRESSURE: 101 MMHG | HEART RATE: 97 BPM | DIASTOLIC BLOOD PRESSURE: 68 MMHG | BODY MASS INDEX: 22.19 KG/M2 | WEIGHT: 155 LBS | HEIGHT: 70 IN

## 2021-07-23 DIAGNOSIS — Z87.59 HISTORY OF SEVERE PRE-ECLAMPSIA: ICD-10-CM

## 2021-07-23 DIAGNOSIS — B00.9 HSV (HERPES SIMPLEX VIRUS) INFECTION: ICD-10-CM

## 2021-07-23 DIAGNOSIS — O09.90 HIGH-RISK PREGNANCY, UNSPECIFIED TRIMESTER: Primary | ICD-10-CM

## 2021-07-23 DIAGNOSIS — R82.71 GBS BACTERIURIA: ICD-10-CM

## 2021-07-23 PROCEDURE — G0463 HOSPITAL OUTPT CLINIC VISIT: HCPCS

## 2021-07-23 PROCEDURE — 99207 PR PRENATAL VISIT: CPT | Performed by: OBSTETRICS & GYNECOLOGY

## 2021-07-23 ASSESSMENT — MIFFLIN-ST. JEOR: SCORE: 1508.33

## 2021-07-23 NOTE — LETTER
"2021     RE: Rach Peterson  [address] [place] [address]     Dear Colleague,    Thank you for referring your patient, Rach Peterson, to the [facility] [place] at [facility]. Please see a copy of my visit note below.    [facility]  Prenatal Visit    SUBJECTIVE  Rach reports she has been doing well this pregnancy. She has questions today about her history of pre-eclampsia and how it affects this pregnancy. No contractions, no vaginal bleeding, no leakage of fluid, normal fetal movement. No headache, no vision changes, no RUQ pain. She has decided to transfer to MD care given her history of pre-eclampsia.    OBJECTIVE  /68   Pulse 97   Ht 1.778 m (5' 10\")   Wt 70.3 kg (155 lb)   LMP 2021 (Exact Date)   BMI 22.24 kg/m      See OB Flowsheet    ASSESSMENT/PLAN  Rach Peterson is a 30 year old  who is 22w0d, here for CHAPARRO.    Problem   Gbs Bacteriuria    GBS in urine. PCN for prophylaxis at time of labor.     High-Risk Pregnancy, Unspecified Trimester    [facility] MD Patient  Partner: Tim  Due date: 21 by LMP c/w 10wk US  ABO/Rh: A POS  Antibody screen: NEG  H/H: 13.0/38.7  Last Pap Smear: NIL , repeat   Rubella: immune  HepB Surface Antigen: NR  HepB Antibody: immune  HepC Antibody: NR  HIV: NR  Treponemal Antibody: NR  GC/CT: neg/neg  Urine Culture: 10-50K CFU GBS  GCT: ____  Tdap:____  GBS: ____  Flu vaccine: ____  Aneuploidy screening: normal NT, normal NIPT  Anatomy scan: normal, posterior placenta  It's a: GIRL! Baby's name: ____  Pediatrician: ____  Infant feeding plan: ____  Contraceptive plan: ____  Birthplan Notes: ____  Utox in labor: no  Over-the-counter postpartum meds: ____     History of Severe Pre-Eclampsia    Taking baby aspirin daily. Baseline labs normal (UPC < 0.05, AST/ALT , plts 265K). Will check baseline creatinine with glucola. " Symptoms of pre-eclampsia and risk given history in this pregnancy reviewed. Consider 39wk IOL.      Hsv (Herpes Simplex Virus) Infection    Taking daily suppression with valtrex. Will need bright light exam at time of labor.        Return to clinic in 4 weeks for EOB visit.    Cele Dennis MD  OB/GYN Resident, PGY-1    OBGYN Attending Addendum     I, Nikkie Hernandez, saw Rach Peterson with the resident, Dr. Dennis, and agree with their findings and plan of care as documented in the above note.    I personally reviewed vitals, meds. I was present to supervise the physical exam.     Key findings: [id number] 22w0d here for prenatal care. Previous pregnancy c/b pre-e with severe features.      I agree with the plan for return visit in 4 weeks with glucola, CBC, and creatinine at that visit.    Nikkie Hernandez MD, MSCI  Date of Service: 2021

## 2021-08-17 ASSESSMENT — ANXIETY QUESTIONNAIRES
1. FEELING NERVOUS, ANXIOUS, OR ON EDGE: SEVERAL DAYS
4. TROUBLE RELAXING: SEVERAL DAYS
3. WORRYING TOO MUCH ABOUT DIFFERENT THINGS: SEVERAL DAYS
8. IF YOU CHECKED OFF ANY PROBLEMS, HOW DIFFICULT HAVE THESE MADE IT FOR YOU TO DO YOUR WORK, TAKE CARE OF THINGS AT HOME, OR GET ALONG WITH OTHER PEOPLE?: NOT DIFFICULT AT ALL
7. FEELING AFRAID AS IF SOMETHING AWFUL MIGHT HAPPEN: SEVERAL DAYS
GAD7 TOTAL SCORE: 6
6. BECOMING EASILY ANNOYED OR IRRITABLE: SEVERAL DAYS
2. NOT BEING ABLE TO STOP OR CONTROL WORRYING: SEVERAL DAYS
7. FEELING AFRAID AS IF SOMETHING AWFUL MIGHT HAPPEN: SEVERAL DAYS
5. BEING SO RESTLESS THAT IT IS HARD TO SIT STILL: NOT AT ALL

## 2021-08-20 ENCOUNTER — LAB (OUTPATIENT)
Dept: LAB | Facility: CLINIC | Age: 31
End: 2021-08-20
Attending: ADVANCED PRACTICE MIDWIFE
Payer: COMMERCIAL

## 2021-08-20 ENCOUNTER — OFFICE VISIT (OUTPATIENT)
Dept: OBGYN | Facility: CLINIC | Age: 31
End: 2021-08-20
Attending: ADVANCED PRACTICE MIDWIFE
Payer: COMMERCIAL

## 2021-08-20 VITALS
SYSTOLIC BLOOD PRESSURE: 110 MMHG | HEART RATE: 85 BPM | DIASTOLIC BLOOD PRESSURE: 72 MMHG | WEIGHT: 160 LBS | BODY MASS INDEX: 22.9 KG/M2 | HEIGHT: 70 IN

## 2021-08-20 DIAGNOSIS — Z87.59 HISTORY OF SEVERE PRE-ECLAMPSIA: ICD-10-CM

## 2021-08-20 DIAGNOSIS — O09.90 HIGH-RISK PREGNANCY, UNSPECIFIED TRIMESTER: ICD-10-CM

## 2021-08-20 DIAGNOSIS — O09.90 HIGH-RISK PREGNANCY, UNSPECIFIED TRIMESTER: Primary | ICD-10-CM

## 2021-08-20 LAB
CREAT SERPL-MCNC: 0.67 MG/DL (ref 0.52–1.04)
DEPRECATED CALCIDIOL+CALCIFEROL SERPL-MC: 41 UG/L (ref 20–75)
ERYTHROCYTE [DISTWIDTH] IN BLOOD BY AUTOMATED COUNT: 12.5 % (ref 10–15)
GFR SERPL CREATININE-BSD FRML MDRD: >90 ML/MIN/1.73M2
GLUCOSE 1H P 50 G GLC PO SERPL-MCNC: 63 MG/DL (ref 70–129)
HCT VFR BLD AUTO: 35.1 % (ref 35–47)
HGB BLD-MCNC: 11.6 G/DL (ref 11.7–15.7)
MCH RBC QN AUTO: 30.7 PG (ref 26.5–33)
MCHC RBC AUTO-ENTMCNC: 33 G/DL (ref 31.5–36.5)
MCV RBC AUTO: 93 FL (ref 78–100)
PLATELET # BLD AUTO: 229 10E3/UL (ref 150–450)
RBC # BLD AUTO: 3.78 10E6/UL (ref 3.8–5.2)
T PALLIDUM AB SER QL: NONREACTIVE
WBC # BLD AUTO: 7 10E3/UL (ref 4–11)

## 2021-08-20 PROCEDURE — 85027 COMPLETE CBC AUTOMATED: CPT | Performed by: ADVANCED PRACTICE MIDWIFE

## 2021-08-20 PROCEDURE — 36415 COLL VENOUS BLD VENIPUNCTURE: CPT | Performed by: ADVANCED PRACTICE MIDWIFE

## 2021-08-20 PROCEDURE — 82565 ASSAY OF CREATININE: CPT

## 2021-08-20 PROCEDURE — 99207 PR PRENATAL VISIT: CPT | Performed by: ADVANCED PRACTICE MIDWIFE

## 2021-08-20 PROCEDURE — 82306 VITAMIN D 25 HYDROXY: CPT | Performed by: ADVANCED PRACTICE MIDWIFE

## 2021-08-20 PROCEDURE — 86780 TREPONEMA PALLIDUM: CPT | Performed by: ADVANCED PRACTICE MIDWIFE

## 2021-08-20 PROCEDURE — 82952 GTT-ADDED SAMPLES: CPT | Performed by: ADVANCED PRACTICE MIDWIFE

## 2021-08-20 PROCEDURE — G0463 HOSPITAL OUTPT CLINIC VISIT: HCPCS

## 2021-08-20 ASSESSMENT — ANXIETY QUESTIONNAIRES
3. WORRYING TOO MUCH ABOUT DIFFERENT THINGS: NOT AT ALL
GAD7 TOTAL SCORE: 4
5. BEING SO RESTLESS THAT IT IS HARD TO SIT STILL: NOT AT ALL
1. FEELING NERVOUS, ANXIOUS, OR ON EDGE: SEVERAL DAYS
7. FEELING AFRAID AS IF SOMETHING AWFUL MIGHT HAPPEN: NOT AT ALL
3. WORRYING TOO MUCH ABOUT DIFFERENT THINGS: NOT AT ALL
7. FEELING AFRAID AS IF SOMETHING AWFUL MIGHT HAPPEN: NOT AT ALL
GAD7 TOTAL SCORE: 4
6. BECOMING EASILY ANNOYED OR IRRITABLE: SEVERAL DAYS
2. NOT BEING ABLE TO STOP OR CONTROL WORRYING: SEVERAL DAYS
2. NOT BEING ABLE TO STOP OR CONTROL WORRYING: SEVERAL DAYS
1. FEELING NERVOUS, ANXIOUS, OR ON EDGE: SEVERAL DAYS
5. BEING SO RESTLESS THAT IT IS HARD TO SIT STILL: NOT AT ALL
6. BECOMING EASILY ANNOYED OR IRRITABLE: SEVERAL DAYS

## 2021-08-20 ASSESSMENT — MIFFLIN-ST. JEOR: SCORE: 1531.01

## 2021-08-20 ASSESSMENT — PATIENT HEALTH QUESTIONNAIRE - PHQ9
5. POOR APPETITE OR OVEREATING: SEVERAL DAYS
SUM OF ALL RESPONSES TO PHQ QUESTIONS 1-9: 2
5. POOR APPETITE OR OVEREATING: SEVERAL DAYS

## 2021-08-20 ASSESSMENT — PAIN SCALES - GENERAL: PAINLEVEL: NO PAIN (0)

## 2021-08-20 NOTE — PROGRESS NOTES
30 year old, , 26w3d, presents for EOB visit.    Patient concerns: Feeling well overall.     Denies cramping/contractions, vaginal bleeding, discharge or leakage of fluid. Reports +fetal movement.  No HA, vision changes, ruq/epigastric pain.      PHQ-9 SCORE 2020   PHQ-9 Total Score 2 2 2       Education completed today includes breast feeding, [facility] hand out, contraception, counting movements, signs of pre-term labor, when to present to birthplace, post partum depression, GBS, getting enough iron and labor induction.  Birth preferences reviewed: hx of unmedicated birth. Hx of IOL for GHTN that developed into severe pre-e with last labor- hoping for normal BP this time.  4.5cm on admission, was later augmented with pitocin.  Labor support:     [place] Feeding plans: breastfeeding   Contraception planned:  Discussed options; deciding  The following labs were ordered today:       GCT, CBC w platelets, Vitamin d, Hep C, Anti-treponema, creatinine  Water birth consent form was not given.    Blood type:   ABO   Date Value Ref Range Status   2021 A  Final     RH(D)   Date Value Ref Range Status   2021 Pos  Final     Antibody Screen   Date Value Ref Range Status   2021 Neg  Final   , Rhogam  was not given.  TDAP  was not given- after 27 weeks    A/P:  Encounter Diagnosis   Name Primary?     High-risk pregnancy, unspecified trimester Yes     Orders Placed This Encounter   Procedures     Glucose 1 Hour     CBC with Platelets     Treponema Abs w Reflex to RPR and Titer     25- OH-Vitamin D     - EOB education reviewed.  - EOB labs ordered- 1 hour glucose, cbc with plts, anti-trep, vitamin D.  - Creatinine added to labs.  - Tdap after 27 weeks.     Continue scheduled prenatal care, RTC in 4 weeks and prn if questions or concerns.      GUANAKO Agrawal, ANTWON

## 2021-08-20 NOTE — LETTER
2021       RE: Rach Peterson   [address] [place] [address]     Dear Colleague,    Thank you for referring your patient, Rach Peterson, to the [facility] [place] at [facility]. Please see a copy of my visit note below.     30 year old, , 26w3d, presents for EOB visit.    Patient concerns: Feeling well overall.     Denies cramping/contractions, vaginal bleeding, discharge or leakage of fluid. Reports +fetal movement.  No HA, vision changes, ruq/epigastric pain.      PHQ-9 SCORE 2020   PHQ-9 Total Score 2 2 2       Education completed today includes breast feeding, [facility] hand out, contraception, counting movements, signs of pre-term labor, when to present to birthplace, post partum depression, GBS, getting enough iron and labor induction.  Birth preferences reviewed: hx of unmedicated birth. Hx of IOL for GHTN that developed into severe pre-e with last labor- hoping for normal BP this time.  4.5cm on admission, was later augmented with pitocin.  Labor support:      Feeding plans: breastfeeding   Contraception planned:  Discussed options; deciding  The following labs were ordered today:       GCT, CBC w platelets, Vitamin d, Hep C, Anti-treponema, creatinine  Water birth consent form was not given.    Blood type:   ABO   Date Value Ref Range Status   2021 A  Final     RH(D)   Date Value Ref Range Status   2021 Pos  Final     Antibody Screen   Date Value Ref Range Status   2021 Neg  Final   , Rhogam  was not given.  TDAP  was not given- after 27 weeks    A/P:  Encounter Diagnosis   Name Primary?     High-risk pregnancy, unspecified trimester Yes     Orders Placed This Encounter   Procedures     Glucose 1 Hour     CBC with Platelets     Treponema Abs w Reflex to RPR and Titer     25- OH-Vitamin D     - EOB education reviewed.  - EOB labs  ordered- 1 hour glucose, cbc with plts, anti-trep, vitamin D.  - Creatinine added to labs.  - Tdap after 27 weeks.     Continue scheduled prenatal care, RTC in 4 weeks and prn if questions or concerns.      GUANAKO Agrawal, CNM

## 2021-08-20 NOTE — NURSING NOTE
Chief Complaint   Patient presents with     Prenatal Care     CHAPARRO 26 weeks and 3 days   Claudine Lock LPN

## 2021-09-19 ASSESSMENT — ANXIETY QUESTIONNAIRES
1. FEELING NERVOUS, ANXIOUS, OR ON EDGE: SEVERAL DAYS
7. FEELING AFRAID AS IF SOMETHING AWFUL MIGHT HAPPEN: SEVERAL DAYS
GAD7 TOTAL SCORE: 4
5. BEING SO RESTLESS THAT IT IS HARD TO SIT STILL: NOT AT ALL
2. NOT BEING ABLE TO STOP OR CONTROL WORRYING: NOT AT ALL
GAD7 TOTAL SCORE: 4
4. TROUBLE RELAXING: SEVERAL DAYS
GAD7 TOTAL SCORE: 4
7. FEELING AFRAID AS IF SOMETHING AWFUL MIGHT HAPPEN: SEVERAL DAYS
6. BECOMING EASILY ANNOYED OR IRRITABLE: SEVERAL DAYS
3. WORRYING TOO MUCH ABOUT DIFFERENT THINGS: NOT AT ALL
8. IF YOU CHECKED OFF ANY PROBLEMS, HOW DIFFICULT HAVE THESE MADE IT FOR YOU TO DO YOUR WORK, TAKE CARE OF THINGS AT HOME, OR GET ALONG WITH OTHER PEOPLE?: NOT DIFFICULT AT ALL

## 2021-09-20 ASSESSMENT — ANXIETY QUESTIONNAIRES: GAD7 TOTAL SCORE: 4

## 2021-09-21 NOTE — PROGRESS NOTES
"[facility]  Prenatal Visit    SUBJECTIVE  No contractions, no vaginal bleeding, no leakage of fluid, normal fetal movement. No headache, no vision changes, no RUQ pain.    OBJECTIVE  /77   Pulse 78   Ht 1.778 m (5' 10\")   Wt 76.7 kg (169 lb)   LMP 2021 (Exact Date)   BMI 24.25 kg/m      See OB Flowsheet    ASSESSMENT/PLAN  Rach Peterson is a 30 year old  who is 31w1d, here for CHAPARRO.    Problem   Gbs Bacteriuria    GBS in urine. PCN for prophylaxis at time of labor.       High-Risk Pregnancy, Unspecified Trimester    WHRUTHIE MOROCHO Patient  Partner: Tim  Due date: 21 by LMP c/w 10wk US  ABO/Rh: A POS  Antibody screen: NEG  H/H: 11.6/35.1  Last Pap Smear: NIL , repeat   Rubella: immune  HepB Surface Antigen: NR  HepB Antibody: immune  HepC Antibody: NR  HIV: NR  Treponemal Antibody: NR  GC/CT: neg/neg  Urine Culture: 10-50K CFU GBS  GCT: 63  Tdap:21  Flu vaccine: 21  GBS: POS by urine  Aneuploidy screening: normal NT, normal NIPT  Anatomy scan: normal, posterior placenta  It's a: GIRL! Baby's name: ____  Pediatrician: ____  Infant feeding plan: breastfeeding  Contraceptive plan: ____  Birthplan Notes: labor support to be provided by Tim.   Utox in labor: no  Over-the-counter postpartum meds: ____     History of Severe Pre-Eclampsia    Taking baby aspirin daily. Baseline labs normal (UPC < 0.05, AST/ALT /, plts 265K, Cr 0.67). Symptoms of pre-eclampsia and risk given history in this pregnancy reviewed. Consider 39wk IOL.          - Return to clinic in 2 weeks for CHAPARRO. Will confirm labor pain management plan and postpartum contraceptive plan at that time. Will continue to discuss if Rach will desire 39wk IOL or to await spontaneous labor.    Dong Alejandro MD, MPH  Ob/Gyn Resident, PGY-1    OBGYN Attending Addendum     I, Nikkie Hernandez, saw Rach Peterson with the resident, Dr. Alejandro, and agree with their findings and plan of care as documented in " the above note.    I personally reviewed vitals, meds, labs.     Key findings: 31 y/o  with history of pre-eclampsia here for prenatal visit at 31w1d.     I agree with the plan for Tdap and flu vaccines today. Return in 2 weeks for ongoing prenatal care. Signs and symptoms of labor and pre-eclampsia reviewed today.    Nikkie Hernandez MD, MSCI  Date of Service: 2021

## 2021-09-22 ENCOUNTER — OFFICE VISIT (OUTPATIENT)
Dept: OBGYN | Facility: CLINIC | Age: 31
End: 2021-09-22
Attending: OBSTETRICS & GYNECOLOGY
Payer: COMMERCIAL

## 2021-09-22 VITALS
DIASTOLIC BLOOD PRESSURE: 77 MMHG | HEART RATE: 78 BPM | BODY MASS INDEX: 24.2 KG/M2 | HEIGHT: 70 IN | WEIGHT: 169 LBS | SYSTOLIC BLOOD PRESSURE: 125 MMHG

## 2021-09-22 DIAGNOSIS — B00.9 HSV (HERPES SIMPLEX VIRUS) INFECTION: ICD-10-CM

## 2021-09-22 DIAGNOSIS — Z87.59 HISTORY OF SEVERE PRE-ECLAMPSIA: ICD-10-CM

## 2021-09-22 DIAGNOSIS — R82.71 GBS BACTERIURIA: ICD-10-CM

## 2021-09-22 DIAGNOSIS — O09.90 HIGH-RISK PREGNANCY, UNSPECIFIED TRIMESTER: ICD-10-CM

## 2021-09-22 PROCEDURE — 250N000011 HC RX IP 250 OP 636

## 2021-09-22 PROCEDURE — 99207 PR PRENATAL VISIT: CPT | Mod: GC | Performed by: OBSTETRICS & GYNECOLOGY

## 2021-09-22 PROCEDURE — 90471 IMMUNIZATION ADMIN: CPT

## 2021-09-22 PROCEDURE — 90686 IIV4 VACC NO PRSV 0.5 ML IM: CPT

## 2021-09-22 PROCEDURE — 90715 TDAP VACCINE 7 YRS/> IM: CPT

## 2021-09-22 PROCEDURE — 90472 IMMUNIZATION ADMIN EACH ADD: CPT

## 2021-09-22 PROCEDURE — G0463 HOSPITAL OUTPT CLINIC VISIT: HCPCS

## 2021-09-22 PROCEDURE — G0008 ADMIN INFLUENZA VIRUS VAC: HCPCS

## 2021-09-22 ASSESSMENT — MIFFLIN-ST. JEOR: SCORE: 1571.83

## 2021-09-22 NOTE — LETTER
"2021       RE: Rach Peterson  [address] [place] [address]     Dear Colleague,    Thank you for referring your patient, Rach Peterson, to the [facility] [place] at [facility]. Please see a copy of my visit note below.    [facility]  Prenatal Visit    SUBJECTIVE  No contractions, no vaginal bleeding, no leakage of fluid, normal fetal movement. No headache, no vision changes, no RUQ pain.    OBJECTIVE  /77   Pulse 78   Ht 1.778 m (5' 10\")   Wt 76.7 kg (169 lb)   LMP 2021 (Exact Date)   BMI 24.25 kg/m      See OB Flowsheet    ASSESSMENT/PLAN  Rach Peterson is a 30 year old  who is 31w1d, here for CHAPARRO.    Problem   Gbs Bacteriuria    GBS in urine. PCN for prophylaxis at time of labor.       High-Risk Pregnancy, Unspecified Trimester    [facility] MD Patient  Partner: Tim  Due date: 21 by LMP c/w 10wk US  ABO/Rh: A POS  Antibody screen: NEG  H/H: 11.6/35.1  Last Pap Smear: NIL , repeat   Rubella: immune  HepB Surface Antigen: NR  HepB Antibody: immune  HepC Antibody: NR  HIV: NR  Treponemal Antibody: NR  GC/CT: neg/neg  Urine Culture: 10-50K CFU GBS  GCT: 63  Tdap:21  Flu vaccine: 21  GBS: POS by urine  Aneuploidy screening: normal NT, normal NIPT  Anatomy scan: normal, posterior placenta  It's a: GIRL! Baby's name: ____  Pediatrician: ____  Infant feeding plan: breastfeeding  Contraceptive plan: ____  Birthplan Notes: labor support to be provided by Tim.   Utox in labor: no  Over-the-counter postpartum meds: ____     History of Severe Pre-Eclampsia    Taking baby aspirin daily. Baseline labs normal (UPC < 0.05, AST/ALT 13/16, plts 265K, Cr 0.67). Symptoms of pre-eclampsia and risk given history in this pregnancy reviewed. Consider 39wk IOL.          - Return to clinic in 2 weeks for CHAPARRO. Will confirm labor pain management plan and postpartum " contraceptive plan at that time. Will continue to discuss if Rach will desire 39wk IOL or to await spontaneous labor.    Dong Alejandro MD, MPH  Ob/Gyn Resident, PGY-1    OBGYN Attending Addendum     I, Nikkie Hernandez, saw Rach Peterson with the resident, Dr. Alejandro, and agree with their findings and plan of care as documented in the above note.    I personally reviewed vitals, meds, labs.     Key findings: 31 y/o  with history of pre-eclampsia here for prenatal visit at 31w1d.     I agree with the plan for Tdap and flu vaccines today. Return in 2 weeks for ongoing prenatal care. Signs and symptoms of labor and pre-eclampsia reviewed today.    Nikkie Hernandez MD, MSCI  Date of Service: 2021

## 2021-10-04 ASSESSMENT — ANXIETY QUESTIONNAIRES
3. WORRYING TOO MUCH ABOUT DIFFERENT THINGS: SEVERAL DAYS
GAD7 TOTAL SCORE: 5
1. FEELING NERVOUS, ANXIOUS, OR ON EDGE: SEVERAL DAYS
8. IF YOU CHECKED OFF ANY PROBLEMS, HOW DIFFICULT HAVE THESE MADE IT FOR YOU TO DO YOUR WORK, TAKE CARE OF THINGS AT HOME, OR GET ALONG WITH OTHER PEOPLE?: NOT DIFFICULT AT ALL
4. TROUBLE RELAXING: NOT AT ALL
GAD7 TOTAL SCORE: 5
7. FEELING AFRAID AS IF SOMETHING AWFUL MIGHT HAPPEN: SEVERAL DAYS
5. BEING SO RESTLESS THAT IT IS HARD TO SIT STILL: NOT AT ALL
2. NOT BEING ABLE TO STOP OR CONTROL WORRYING: SEVERAL DAYS
6. BECOMING EASILY ANNOYED OR IRRITABLE: SEVERAL DAYS
GAD7 TOTAL SCORE: 5
7. FEELING AFRAID AS IF SOMETHING AWFUL MIGHT HAPPEN: SEVERAL DAYS

## 2021-10-05 ASSESSMENT — ANXIETY QUESTIONNAIRES: GAD7 TOTAL SCORE: 5

## 2021-10-10 NOTE — PROGRESS NOTES
Subjective:      30 year old  at 33w6d presents with Tim for a routine prenatal appointment.   GBS swab not needed next visit, repeat CBC at 36 wks     Rach's birthday is tomorrow! She went to a movie and dinner to celebrate. Baby Lydia stayed with her parents  Rach is currently taking Valtrex for ongoing HSV suppression     She did not use any pain medication with her daughter who was born    Hoping for an unmedicated birth. Tim will support her. Interested in trying nitrous oxide. Had a rapid birth with Lydia, lives 20 minutes away.      Denies vaginal bleeding or leakage of fluid.  Denies contractions. Reports good fetal movement.         No HA, visual changes, RUQ or epigastric pain.     Reviewed TDAP Previously given   Recommended Flu Vaccine.  Patient already received Flu Vaccine      Objective:  Vitals:    10/11/21 0824   BP: 123/83   Pulse: 83   Weight: 77.1 kg (170 lb)   , see ob flowsheet  Assessment/Plan     Encounter Diagnosis   Name Primary?     High-risk pregnancy, unspecified trimester Yes     No orders of the defined types were placed in this encounter.    No orders of the defined types were placed in this encounter.    ABO   Date Value Ref Range Status   2021 A  Final     RH(D)   Date Value Ref Range Status   2021 Pos  Final     Antibody Screen   Date Value Ref Range Status   2021 Neg  Final   , Rhogam  was notgiven.    Recommended Flu Vaccine.  Patient already received Flu Vaccine last visit        -Reviewed s/sx of labor, encouraged to call when in early labor d/t history of rapid birth. Reviewed options to support unmedicated birth including hydrotherapy and nitrous oxide  - Reviewed total weight gain, encouraged continued healthy diet and exercise.  .  Reviewed importance of daily fetal kick count and why/how to contact provider.    - Reviewed why/how to contact provider if headache/visual changes/RUQ or epigastric pain, decreased fetal movement, vaginal  bleeding, leakage of fluid or more than 4 contractions in an hour.     Will not need GBS swab at 36 weeks d/t GBS bacteruria previously observed in pregnancy     Return to clinic in 2 weeks and prn if questions or concerns.     Katherine Sidhu CNM      Answers for HPI/ROS submitted by the patient on 10/4/2021  MYNOR 7 TOTAL SCORE: 5

## 2021-10-11 ENCOUNTER — OFFICE VISIT (OUTPATIENT)
Dept: OBGYN | Facility: CLINIC | Age: 31
End: 2021-10-11
Attending: ADVANCED PRACTICE MIDWIFE
Payer: COMMERCIAL

## 2021-10-11 VITALS
WEIGHT: 170 LBS | HEART RATE: 83 BPM | BODY MASS INDEX: 24.39 KG/M2 | DIASTOLIC BLOOD PRESSURE: 83 MMHG | SYSTOLIC BLOOD PRESSURE: 123 MMHG

## 2021-10-11 DIAGNOSIS — O09.90 HIGH-RISK PREGNANCY, UNSPECIFIED TRIMESTER: Primary | ICD-10-CM

## 2021-10-11 PROCEDURE — G0463 HOSPITAL OUTPT CLINIC VISIT: HCPCS

## 2021-10-11 PROCEDURE — 99207 PR NO BILLABLE SERVICE THIS VISIT: CPT | Performed by: ADVANCED PRACTICE MIDWIFE

## 2021-10-11 ASSESSMENT — PAIN SCALES - GENERAL: PAINLEVEL: NO PAIN (0)

## 2021-10-11 NOTE — NURSING NOTE
Chief Complaint   Patient presents with     Prenatal Care     CHAPARRO 33 weeks and 5 days   Claudine Lock LPN

## 2021-10-11 NOTE — LETTER
10/11/2021       RE: Rach Peterson   [address] [place] [address]     Dear Colleague,    Thank you for referring your patient, Rach Peterson, to the [facility] [place] at [facility]. Please see a copy of my visit note below.    Subjective:      30 year old  at 33w6d presents with iTm for a routine prenatal appointment.   GBS swab not needed next visit, repeat CBC at 36 wks     Rach's birthday is tomorrow! She went to a movie and dinner to celebrate. Baby Lydia stayed with her parents  Rach is currently taking Valtrex for ongoing HSV suppression     She did not use any pain medication with her daughter who was born    Hoping for an unmedicated birth. Tim will support her. Interested in trying nitrous oxide. Had a rapid birth with Lydia, lives 20 minutes away.      Denies vaginal bleeding or leakage of fluid.  Denies contractions. Reports good fetal movement.         No HA, visual changes, RUQ or epigastric pain.     Reviewed TDAP Previously given   Recommended Flu Vaccine.  Patient already received Flu Vaccine      Objective:  Vitals:    10/11/21 0824   BP: 123/83   Pulse: 83   Weight: 77.1 kg (170 lb)   , see ob flowsheet  Assessment/Plan     Encounter Diagnosis   Name Primary?     High-risk pregnancy, unspecified trimester Yes     No orders of the defined types were placed in this encounter.    No orders of the defined types were placed in this encounter.    ABO   Date Value Ref Range Status   2021 A  Final     RH(D)   Date Value Ref Range Status   2021 Pos  Final     Antibody Screen   Date Value Ref Range Status   2021 Neg  Final   , Rhogam  was notgiven.    Recommended Flu Vaccine.  Patient already received Flu Vaccine last visit        -Reviewed s/sx of labor, encouraged to call when in early labor d/t history of rapid birth. Reviewed options to support unmedicated birth  including hydrotherapy and nitrous oxide  - Reviewed total weight gain, encouraged continued healthy diet and exercise.  .  Reviewed importance of daily fetal kick count and why/how to contact provider.    - Reviewed why/how to contact provider if headache/visual changes/RUQ or epigastric pain, decreased fetal movement, vaginal bleeding, leakage of fluid or more than 4 contractions in an hour.     Will not need GBS swab at 36 weeks d/t GBS bacteruria previously observed in pregnancy     Return to clinic in 2 weeks and prn if questions or concerns.     Ktaherine Sidhu CNM      Answers for HPI/ROS submitted by the patient on 10/4/2021  MYNOR 7 TOTAL SCORE: 5

## 2021-10-22 ASSESSMENT — ANXIETY QUESTIONNAIRES
4. TROUBLE RELAXING: NOT AT ALL
GAD7 TOTAL SCORE: 4
7. FEELING AFRAID AS IF SOMETHING AWFUL MIGHT HAPPEN: SEVERAL DAYS
5. BEING SO RESTLESS THAT IT IS HARD TO SIT STILL: NOT AT ALL
8. IF YOU CHECKED OFF ANY PROBLEMS, HOW DIFFICULT HAVE THESE MADE IT FOR YOU TO DO YOUR WORK, TAKE CARE OF THINGS AT HOME, OR GET ALONG WITH OTHER PEOPLE?: NOT DIFFICULT AT ALL
GAD7 TOTAL SCORE: 4
3. WORRYING TOO MUCH ABOUT DIFFERENT THINGS: NOT AT ALL
2. NOT BEING ABLE TO STOP OR CONTROL WORRYING: SEVERAL DAYS
1. FEELING NERVOUS, ANXIOUS, OR ON EDGE: SEVERAL DAYS
6. BECOMING EASILY ANNOYED OR IRRITABLE: SEVERAL DAYS
7. FEELING AFRAID AS IF SOMETHING AWFUL MIGHT HAPPEN: SEVERAL DAYS
GAD7 TOTAL SCORE: 4

## 2021-10-23 ASSESSMENT — ANXIETY QUESTIONNAIRES: GAD7 TOTAL SCORE: 4

## 2021-10-25 NOTE — PROGRESS NOTES
"[facility]  Prenatal Visit    SUBJECTIVE  Overall doing really well. Expresses some concern about slowing increasing blood pressures at home every other day and they have been normal. Has been No contractions, no vaginal bleeding, no leakage of fluid, normal fetal movement. No headache, no vision changes, no RUQ pain.    OBJECTIVE  /86   Pulse 90   Ht 1.778 m (5' 10\")   Wt 79.9 kg (176 lb 3.2 oz)   LMP 2021 (Exact Date)   BMI 25.28 kg/m      See OB Flowsheet    ASSESSMENT/PLAN  Rach Peterson is a 31 year old  who is 36w3d, here for CHAPARRO. Overall doing well. Declines s/s of preE.     Possible Breech presentation:  - baby breech on most recent imaging scan. Feels breech on Leopold's during visit today.  - Discussed options including ECV vs scheduled CS. Patient would like to try ECV. The procedure, alternatives, and risks were discussed with the patient. Alternatives included expectant management and scheduled  delivery.  Risks discussed included but were not limited to patient discomfort, placental abruption, fetal intolerance of procedure requiring emergent or urgent delivery. Rach verbalizes understanding of this.  - Presentation US in radiology scheduled for later today; ECV scheduled for  at 10AM.    Problem   Gbs Bacteriuria    GBS in urine. PCN for prophylaxis at time of labor.        High-Risk Pregnancy, Unspecified Trimester    [facility] MD Patient  Partner: Tim, Daughter: Lydia  Due date: 21 by LMP c/w 10wk US  ABO/Rh: A POS  Antibody screen: NEG  H/H: 11.6/35.1  Last Pap Smear: NIL , repeat   Rubella: immune  HepB Surface Antigen: NR  HepB Antibody: immune  HepC Antibody: NR  HIV: NR  Treponemal Antibody: NR  GC/CT: neg/neg  Urine Culture: 10-50K CFU GBS  GCT: 63  Tdap:21  Flu vaccine: 21  GBS: POS by urine  Aneuploidy screening: normal NT, normal NIPT  Anatomy scan: normal, posterior placenta  It's a: GIRL! Her name is a " surprise!  Pediatrician: Dr. Jessica Leahy ([facility])  Infant feeding plan: bottlefeeding  Contraceptive plan: Nexplanon - will plan a 2 wk PP visit for placement  Birthplan Notes: labor support to be provided by Tim, hoping for unmedicated, open to medication if needed.  Utox in labor: no  Over-the-counter postpartum meds: ____     History of Severe Pre-Eclampsia    Taking baby aspirin daily. Baseline labs normal (UPC < 0.05, AST/ALT 13/16, plts 265K, Cr 0.67). Symptoms of pre-eclampsia and risk given history in this pregnancy reviewed. Home BPs reviewed, normal. Reviewed signs/symptoms of pre-e and when to call.    We briefly discussed the idea of 39wk IOL today, but deferred further given concern for breech presentation. Will make a formal plan at next visit.      Hsv (Herpes Simplex Virus) Infection    Taking daily suppression with valtrex. Will need bright light exam at time of labor.             Patient seen with Dr. David Huang DO, MS  OBGYN Resident, PGY1  [facility]  10/29/2021 12:27 PM    OBGYN Attending Addendum     I, Nikkie Hernandez, saw Rach Peterson with the resident, Dr. Huang, and agree with their findings and plan of care as documented in the above note.    I personally reviewed vitals, meds, labs, FHT.     Key findings:  at 36w3d here for prenatal care.     I agree with the plan for presentation US and ECV if remains breech. Return for prenatal care in 1 week, will discuss 39wk IOL at that time.    Nikkie Hernandez MD, MSCI  Date of Service: 10/29/2021

## 2021-10-29 ENCOUNTER — OFFICE VISIT (OUTPATIENT)
Dept: OBGYN | Facility: CLINIC | Age: 31
End: 2021-10-29
Attending: OBSTETRICS & GYNECOLOGY
Payer: COMMERCIAL

## 2021-10-29 ENCOUNTER — HOSPITAL ENCOUNTER (OUTPATIENT)
Dept: ULTRASOUND IMAGING | Facility: CLINIC | Age: 31
End: 2021-10-29
Attending: OBSTETRICS & GYNECOLOGY
Payer: COMMERCIAL

## 2021-10-29 VITALS
SYSTOLIC BLOOD PRESSURE: 127 MMHG | DIASTOLIC BLOOD PRESSURE: 86 MMHG | HEART RATE: 90 BPM | HEIGHT: 70 IN | BODY MASS INDEX: 25.22 KG/M2 | WEIGHT: 176.2 LBS

## 2021-10-29 DIAGNOSIS — B00.9 HSV (HERPES SIMPLEX VIRUS) INFECTION: ICD-10-CM

## 2021-10-29 DIAGNOSIS — R82.71 GBS BACTERIURIA: ICD-10-CM

## 2021-10-29 DIAGNOSIS — Z87.59 HISTORY OF SEVERE PRE-ECLAMPSIA: ICD-10-CM

## 2021-10-29 DIAGNOSIS — O09.90 HIGH-RISK PREGNANCY, UNSPECIFIED TRIMESTER: Primary | ICD-10-CM

## 2021-10-29 DIAGNOSIS — O09.90 HIGH-RISK PREGNANCY, UNSPECIFIED TRIMESTER: ICD-10-CM

## 2021-10-29 PROCEDURE — 76805 OB US >/= 14 WKS SNGL FETUS: CPT

## 2021-10-29 PROCEDURE — G0463 HOSPITAL OUTPT CLINIC VISIT: HCPCS

## 2021-10-29 PROCEDURE — 76805 OB US >/= 14 WKS SNGL FETUS: CPT | Mod: 26 | Performed by: RADIOLOGY

## 2021-10-29 PROCEDURE — 99207 PR PRENATAL VISIT: CPT | Mod: GC | Performed by: OBSTETRICS & GYNECOLOGY

## 2021-10-29 ASSESSMENT — MIFFLIN-ST. JEOR: SCORE: 1599.49

## 2021-10-29 NOTE — PATIENT INSTRUCTIONS
External cephalic version   [facility]  4th floor, labor and delivery  10:00AM 11/2/21    US TODAY 10/29/21 11:30AM   Main floor, radiology   PA form faxed to appropriate facility.

## 2021-10-29 NOTE — LETTER
"10/29/2021       RE: Rach Peterson   [address] [place] [address]     Dear Colleague,    Thank you for referring your patient, Rach Peterson, to the [facility] [place] at [facility]. Please see a copy of my visit note below.    [facility]  Prenatal Visit    SUBJECTIVE  Overall doing really well. Expresses some concern about slowing increasing blood pressures at home every other day and they have been normal. Has been No contractions, no vaginal bleeding, no leakage of fluid, normal fetal movement. No headache, no vision changes, no RUQ pain.    OBJECTIVE  /86   Pulse 90   Ht 1.778 m (5' 10\")   Wt 79.9 kg (176 lb 3.2 oz)   LMP 2021 (Exact Date)   BMI 25.28 kg/m      See OB Flowsheet    ASSESSMENT/PLAN  Rach Peterson is a 31 year old  who is 36w3d, here for CHAPARRO. Overall doing well. Declines s/s of preE.     Possible Breech presentation:  - baby breech on most recent imaging scan. Feels breech on Leopold's during visit today.  - Discussed options including ECV vs scheduled CS. Patient would like to try ECV. The procedure, alternatives, and risks were discussed with the patient. Alternatives included expectant management and scheduled  delivery.  Risks discussed included but were not limited to patient discomfort, placental abruption, fetal intolerance of procedure requiring emergent or urgent delivery. Rach verbalizes understanding of this.  - Presentation US in radiology scheduled for later today; ECV scheduled for  at 10AM.    Problem   Gbs Bacteriuria    GBS in urine. PCN for prophylaxis at time of labor.        High-Risk Pregnancy, Unspecified Trimester    [facility] MD Patient  Partner: Tim, Daughter: Lydia  Due date: 21 by LMP c/w 10wk US  ABO/Rh: A POS  Antibody screen: NEG  H/H: 11.6/35.1  Last Pap Smear: NIL , repeat   Rubella: immune  HepB " Surface Antigen: NR  HepB Antibody: immune  HepC Antibody: NR  HIV: NR  Treponemal Antibody: NR  GC/CT: neg/neg  Urine Culture: 10-50K CFU GBS  GCT: 63  Tdap:21  Flu vaccine: 21  GBS: POS by urine  Aneuploidy screening: normal NT, normal NIPT  Anatomy scan: normal, posterior placenta  It's a: GIRL! Her name is a surprise!  Pediatrician: Dr. Jessica Leahy ([facility])  Infant feeding plan: bottlefeeding  Contraceptive plan: Nexplanon - will plan a 2 wk PP visit for placement  Birthplan Notes: labor support to be provided by Tim, hoping for unmedicated, open to medication if needed.  Utox in labor: no  Over-the-counter postpartum meds: ____     History of Severe Pre-Eclampsia    Taking baby aspirin daily. Baseline labs normal (UPC < 0.05, AST/ALT 13/16, plts 265K, Cr 0.67). Symptoms of pre-eclampsia and risk given history in this pregnancy reviewed. Home BPs reviewed, normal. Reviewed signs/symptoms of pre-e and when to call.    We briefly discussed the idea of 39wk IOL today, but deferred further given concern for breech presentation. Will make a formal plan at next visit.      Hsv (Herpes Simplex Virus) Infection    Taking daily suppression with valtrex. Will need bright light exam at time of labor.             Patient seen with Dr. David Huang DO, MS  OBGYN Resident, PGY1  [facility]  10/29/2021 12:27 PM    OBGYN Attending Addendum     I, Nikkie Hernandez, saw Rach Peterson with the resident, Dr. Huang, and agree with their findings and plan of care as documented in the above note.    I personally reviewed vitals, meds, labs, FHT.     Key findings:  at 36w3d here for prenatal care.     I agree with the plan for presentation US and ECV if remains breech. Return for prenatal care in 1 week, will discuss 39wk IOL at that time.    Nikkie Hernandez MD, MSCI  Date of Service: 10/29/2021

## 2021-11-02 ASSESSMENT — ANXIETY QUESTIONNAIRES
4. TROUBLE RELAXING: NOT AT ALL
GAD7 TOTAL SCORE: 4
2. NOT BEING ABLE TO STOP OR CONTROL WORRYING: SEVERAL DAYS
5. BEING SO RESTLESS THAT IT IS HARD TO SIT STILL: NOT AT ALL
7. FEELING AFRAID AS IF SOMETHING AWFUL MIGHT HAPPEN: SEVERAL DAYS
3. WORRYING TOO MUCH ABOUT DIFFERENT THINGS: NOT AT ALL
1. FEELING NERVOUS, ANXIOUS, OR ON EDGE: SEVERAL DAYS
6. BECOMING EASILY ANNOYED OR IRRITABLE: SEVERAL DAYS

## 2021-11-08 ASSESSMENT — ANXIETY QUESTIONNAIRES
1. FEELING NERVOUS, ANXIOUS, OR ON EDGE: SEVERAL DAYS
6. BECOMING EASILY ANNOYED OR IRRITABLE: SEVERAL DAYS
7. FEELING AFRAID AS IF SOMETHING AWFUL MIGHT HAPPEN: SEVERAL DAYS
GAD7 TOTAL SCORE: 4
3. WORRYING TOO MUCH ABOUT DIFFERENT THINGS: NOT AT ALL
7. FEELING AFRAID AS IF SOMETHING AWFUL MIGHT HAPPEN: SEVERAL DAYS
5. BEING SO RESTLESS THAT IT IS HARD TO SIT STILL: NOT AT ALL
GAD7 TOTAL SCORE: 4
8. IF YOU CHECKED OFF ANY PROBLEMS, HOW DIFFICULT HAVE THESE MADE IT FOR YOU TO DO YOUR WORK, TAKE CARE OF THINGS AT HOME, OR GET ALONG WITH OTHER PEOPLE?: NOT DIFFICULT AT ALL
4. TROUBLE RELAXING: NOT AT ALL
2. NOT BEING ABLE TO STOP OR CONTROL WORRYING: SEVERAL DAYS
GAD7 TOTAL SCORE: 4

## 2021-11-09 ENCOUNTER — OFFICE VISIT (OUTPATIENT)
Dept: OBGYN | Facility: CLINIC | Age: 31
End: 2021-11-09
Attending: OBSTETRICS & GYNECOLOGY
Payer: COMMERCIAL

## 2021-11-09 VITALS
HEART RATE: 87 BPM | DIASTOLIC BLOOD PRESSURE: 85 MMHG | WEIGHT: 177.6 LBS | HEIGHT: 70 IN | BODY MASS INDEX: 25.43 KG/M2 | SYSTOLIC BLOOD PRESSURE: 125 MMHG

## 2021-11-09 DIAGNOSIS — O09.93 HIGH-RISK PREGNANCY IN THIRD TRIMESTER: Primary | ICD-10-CM

## 2021-11-09 PROCEDURE — 99207 PR PRENATAL VISIT: CPT | Mod: GC | Performed by: OBSTETRICS & GYNECOLOGY

## 2021-11-09 PROCEDURE — G0463 HOSPITAL OUTPT CLINIC VISIT: HCPCS

## 2021-11-09 ASSESSMENT — ANXIETY QUESTIONNAIRES: GAD7 TOTAL SCORE: 4

## 2021-11-09 ASSESSMENT — MIFFLIN-ST. JEOR: SCORE: 1605.84

## 2021-11-09 NOTE — PROGRESS NOTES
"[facility]  Prenatal Visit    SUBJECTIVE  Patient has been doing really well. Is feeling occasional abdominal tightening's but no painful contractions. Denies vaginal bleeding, no leakage of fluid, goodl fetal movement. No headache, no vision changes, no RUQ pain. Has not checked her blood pressure in the past week, though has previously been wnl at home. Is ready for baby to come and would like to discuss scheduling 39 week induction today.     Pregnancy complicated by:   - GBS bacteriuria  - Hx severe preE   - HSV    OBJECTIVE  /85   Pulse 87   Ht 1.778 m (5' 10\")   Wt 80.6 kg (177 lb 9.6 oz)   LMP 2021 (Exact Date)   BMI 25.48 kg/m      See OB Flowsheet    ASSESSMENT/PLAN  Rach Peterson is a 31 year old  who is 38w0d here for CHAPARRO. Overall doing well. Declines s/s of preE.     # GBS bacteriuria  -PCN prophylaxis in labor discussed    # Hx severe preE  - has been monitoring blood pressure at home, previously wnl  - taking 81mg ASA, will continue  - denies s/s preE today. Reviewed and discussed return precautions  - baseline HELLP labs wnl. UPC < 0.05    # HSV  - on daily Valtrex for suppression  - bright light exam at time of labor needed    # PNC   - RH positive, antibody negative, Rubella immune, all other infectious labs wnl    # Delivery Planning  - Its a Girl ! Name: Minda  - Plans for unmediated birth. Aware of options for pain management during labor  - Pediatrician: Dr. Jessica Leahy ([facility]).   - Feeding: bottlefeeding  - Contraception: Nexplanon    IOL scheduled for  at 8am. Reviewed instructions, COVID testing ordered.     Patient seen and evaluated with Dr. Aniya Huang DO, MS  OBGYN Resident, PGY1  [facility]    Appreciate note by Dr. Huang. Patient has been seen and examined by me separate from the resident, agree with above note.     Josefa Kwan MD        2021 " 10:14 AM

## 2021-11-09 NOTE — LETTER
"2021       RE: Rach Peterson   [address] [place] [address]     Dear Colleague,    Thank you for referring your patient, Rach Peterson, to the [facility] [place] at [facility]. Please see a copy of my visit note below.    [facility]  Prenatal Visit    SUBJECTIVE  Patient has been doing really well. Is feeling occasional abdominal tightening's but no painful contractions. Denies vaginal bleeding, no leakage of fluid, goodl fetal movement. No headache, no vision changes, no RUQ pain. Has not checked her blood pressure in the past week, though has previously been wnl at home. Is ready for baby to come and would like to discuss scheduling 39 week induction today.     Pregnancy complicated by:   - GBS bacteriuria  - Hx severe preE   - HSV    OBJECTIVE  /85   Pulse 87   Ht 1.778 m (5' 10\")   Wt 80.6 kg (177 lb 9.6 oz)   LMP 2021 (Exact Date)   BMI 25.48 kg/m      See OB Flowsheet    ASSESSMENT/PLAN  Rach Peterson is a 31 year old  who is 38w0d here for CHAPARRO. Overall doing well. Declines s/s of preE.     # GBS bacteriuria  -PCN prophylaxis in labor discussed    # Hx severe preE  - has been monitoring blood pressure at home, previously wnl  - taking 81mg ASA, will continue  - denies s/s preE today. Reviewed and discussed return precautions  - baseline HELLP labs wnl. UPC < 0.05    # HSV  - on daily Valtrex for suppression  - bright light exam at time of labor needed    # PNC   - RH positive, antibody negative, Rubella immune, all other infectious labs wnl    # Delivery Planning  - Its a Girl ! Name: [id number]  - Plans for unmediated birth. Aware of options for pain management during labor  - Pediatrician: Dr. Jessica Leahy ([facility]).   - Feeding: bottlefeeding  - Contraception: Nexplanon    IOL scheduled for  at 8am. Reviewed instructions, COVID testing " ordered.     Patient seen and evaluated with Dr. Aniya Huang DO, MS  OBGYN Resident, PGY1  [facility]    Appreciate note by Dr. Huang. Patient has been seen and examined by me separate from the resident, agree with above note.     Josefa Kwan MD        11/09/2021 10:14 AM

## 2021-11-13 ENCOUNTER — LAB (OUTPATIENT)
Dept: URGENT CARE | Facility: URGENT CARE | Age: 31
End: 2021-11-13
Attending: OBSTETRICS & GYNECOLOGY
Payer: COMMERCIAL

## 2021-11-13 DIAGNOSIS — O09.93 HIGH-RISK PREGNANCY IN THIRD TRIMESTER: ICD-10-CM

## 2021-11-13 PROCEDURE — U0005 INFEC AGEN DETEC AMPLI PROBE: HCPCS

## 2021-11-13 PROCEDURE — U0003 INFECTIOUS AGENT DETECTION BY NUCLEIC ACID (DNA OR RNA); SEVERE ACUTE RESPIRATORY SYNDROME CORONAVIRUS 2 (SARS-COV-2) (CORONAVIRUS DISEASE [COVID-19]), AMPLIFIED PROBE TECHNIQUE, MAKING USE OF HIGH THROUGHPUT TECHNOLOGIES AS DESCRIBED BY CMS-2020-01-R: HCPCS

## 2021-11-14 LAB — SARS-COV-2 RNA RESP QL NAA+PROBE: NEGATIVE

## 2021-11-15 ENCOUNTER — TELEPHONE (OUTPATIENT)
Dept: OBGYN | Facility: CLINIC | Age: 31
End: 2021-11-15
Payer: COMMERCIAL

## 2021-11-15 NOTE — TELEPHONE ENCOUNTER
Called patient as instructed by Charge RN to discuss that her elective IOL scheduled for tomorrow (Tuesday, 11/15/2021 @ 8 AM) would need to be transitioned to Wednesday 11/16/2021 at 10 AM due to unit being closed and staffing issues.  Apologized for change in plans but looking forward to caring for her on new date.  She understands and is agreeable with the new plan.  We discussed to call with any concerns prior to that time.      Cele Akers MD

## 2021-11-17 ENCOUNTER — HOSPITAL ENCOUNTER (INPATIENT)
Facility: CLINIC | Age: 31
LOS: 2 days | Discharge: HOME | End: 2021-11-19
Attending: OBSTETRICS & GYNECOLOGY | Admitting: OBSTETRICS & GYNECOLOGY
Payer: COMMERCIAL

## 2021-11-17 ENCOUNTER — ANESTHESIA EVENT (OUTPATIENT)
Dept: OBGYN | Facility: CLINIC | Age: 31
End: 2021-11-17
Payer: COMMERCIAL

## 2021-11-17 ENCOUNTER — ANESTHESIA (OUTPATIENT)
Dept: OBGYN | Facility: CLINIC | Age: 31
End: 2021-11-17
Payer: COMMERCIAL

## 2021-11-17 PROBLEM — Z34.90 ENCOUNTER FOR INDUCTION OF LABOR: Status: ACTIVE | Noted: 2021-11-17

## 2021-11-17 LAB
ABO/RH(D): NORMAL
ANTIBODY SCREEN: NEGATIVE
ERYTHROCYTE [DISTWIDTH] IN BLOOD BY AUTOMATED COUNT: 12.9 % (ref 10–15)
HCT VFR BLD AUTO: 35.5 % (ref 35–47)
HGB BLD-MCNC: 12.1 G/DL (ref 11.7–15.7)
HOLD SPECIMEN: NORMAL
MCH RBC QN AUTO: 30.8 PG (ref 26.5–33)
MCHC RBC AUTO-ENTMCNC: 34.1 G/DL (ref 31.5–36.5)
MCV RBC AUTO: 90 FL (ref 78–100)
PLATELET # BLD AUTO: 221 10E3/UL (ref 150–450)
RBC # BLD AUTO: 3.93 10E6/UL (ref 3.8–5.2)
SPECIMEN EXPIRATION DATE: NORMAL
WBC # BLD AUTO: 8.8 10E3/UL (ref 4–11)

## 2021-11-17 PROCEDURE — 120N000002 HC R&B MED SURG/OB UMMC

## 2021-11-17 PROCEDURE — 250N000009 HC RX 250: Performed by: STUDENT IN AN ORGANIZED HEALTH CARE EDUCATION/TRAINING PROGRAM

## 2021-11-17 PROCEDURE — 86900 BLOOD TYPING SEROLOGIC ABO: CPT | Performed by: STUDENT IN AN ORGANIZED HEALTH CARE EDUCATION/TRAINING PROGRAM

## 2021-11-17 PROCEDURE — 250N000011 HC RX IP 250 OP 636: Performed by: STUDENT IN AN ORGANIZED HEALTH CARE EDUCATION/TRAINING PROGRAM

## 2021-11-17 PROCEDURE — 85027 COMPLETE CBC AUTOMATED: CPT | Performed by: STUDENT IN AN ORGANIZED HEALTH CARE EDUCATION/TRAINING PROGRAM

## 2021-11-17 PROCEDURE — 258N000003 HC RX IP 258 OP 636: Performed by: STUDENT IN AN ORGANIZED HEALTH CARE EDUCATION/TRAINING PROGRAM

## 2021-11-17 PROCEDURE — 86780 TREPONEMA PALLIDUM: CPT | Performed by: STUDENT IN AN ORGANIZED HEALTH CARE EDUCATION/TRAINING PROGRAM

## 2021-11-17 PROCEDURE — 370N000003 HC ANESTHESIA WARD SERVICE

## 2021-11-17 PROCEDURE — 250N000013 HC RX MED GY IP 250 OP 250 PS 637: Performed by: STUDENT IN AN ORGANIZED HEALTH CARE EDUCATION/TRAINING PROGRAM

## 2021-11-17 PROCEDURE — 10907ZC DRAINAGE OF AMNIOTIC FLUID, THERAPEUTIC FROM PRODUCTS OF CONCEPTION, VIA NATURAL OR ARTIFICIAL OPENING: ICD-10-PCS | Performed by: OBSTETRICS & GYNECOLOGY

## 2021-11-17 RX ORDER — ONDANSETRON 2 MG/ML
4 INJECTION INTRAMUSCULAR; INTRAVENOUS EVERY 6 HOURS PRN
Status: DISCONTINUED | OUTPATIENT
Start: 2021-11-17 | End: 2021-11-18 | Stop reason: HOSPADM

## 2021-11-17 RX ORDER — SODIUM CHLORIDE, SODIUM LACTATE, POTASSIUM CHLORIDE, CALCIUM CHLORIDE 600; 310; 30; 20 MG/100ML; MG/100ML; MG/100ML; MG/100ML
INJECTION, SOLUTION INTRAVENOUS CONTINUOUS
Status: DISCONTINUED | OUTPATIENT
Start: 2021-11-17 | End: 2021-11-18 | Stop reason: HOSPADM

## 2021-11-17 RX ORDER — ACETAMINOPHEN 325 MG/1
650 TABLET ORAL EVERY 4 HOURS PRN
Status: DISCONTINUED | OUTPATIENT
Start: 2021-11-17 | End: 2021-11-18 | Stop reason: HOSPADM

## 2021-11-17 RX ORDER — NALOXONE HYDROCHLORIDE 0.4 MG/ML
0.2 INJECTION, SOLUTION INTRAMUSCULAR; INTRAVENOUS; SUBCUTANEOUS
Status: DISCONTINUED | OUTPATIENT
Start: 2021-11-17 | End: 2021-11-18 | Stop reason: HOSPADM

## 2021-11-17 RX ORDER — PENICILLIN G 3000000 [IU]/50ML
3 INJECTION, SOLUTION INTRAVENOUS EVERY 4 HOURS
Status: DISCONTINUED | OUTPATIENT
Start: 2021-11-17 | End: 2021-11-18 | Stop reason: HOSPADM

## 2021-11-17 RX ORDER — ONDANSETRON 4 MG/1
4 TABLET, ORALLY DISINTEGRATING ORAL EVERY 6 HOURS PRN
Status: DISCONTINUED | OUTPATIENT
Start: 2021-11-17 | End: 2021-11-18 | Stop reason: HOSPADM

## 2021-11-17 RX ORDER — PENICILLIN G POTASSIUM 5000000 [IU]/1
5 INJECTION, POWDER, FOR SOLUTION INTRAMUSCULAR; INTRAVENOUS ONCE
Status: COMPLETED | OUTPATIENT
Start: 2021-11-17 | End: 2021-11-17

## 2021-11-17 RX ORDER — CARBOPROST TROMETHAMINE 250 UG/ML
250 INJECTION, SOLUTION INTRAMUSCULAR
Status: DISCONTINUED | OUTPATIENT
Start: 2021-11-17 | End: 2021-11-18 | Stop reason: HOSPADM

## 2021-11-17 RX ORDER — KETOROLAC TROMETHAMINE 30 MG/ML
30 INJECTION, SOLUTION INTRAMUSCULAR; INTRAVENOUS
Status: DISCONTINUED | OUTPATIENT
Start: 2021-11-17 | End: 2021-11-18

## 2021-11-17 RX ORDER — FENTANYL CITRATE-0.9 % NACL/PF 10 MCG/ML
100 PLASTIC BAG, INJECTION (ML) INTRAVENOUS EVERY 5 MIN PRN
Status: DISCONTINUED | OUTPATIENT
Start: 2021-11-17 | End: 2021-11-18 | Stop reason: HOSPADM

## 2021-11-17 RX ORDER — PROCHLORPERAZINE 25 MG
25 SUPPOSITORY, RECTAL RECTAL EVERY 12 HOURS PRN
Status: DISCONTINUED | OUTPATIENT
Start: 2021-11-17 | End: 2021-11-18 | Stop reason: HOSPADM

## 2021-11-17 RX ORDER — NALOXONE HYDROCHLORIDE 0.4 MG/ML
0.4 INJECTION, SOLUTION INTRAMUSCULAR; INTRAVENOUS; SUBCUTANEOUS
Status: DISCONTINUED | OUTPATIENT
Start: 2021-11-17 | End: 2021-11-18 | Stop reason: HOSPADM

## 2021-11-17 RX ORDER — FENTANYL CITRATE 50 UG/ML
50-100 INJECTION, SOLUTION INTRAMUSCULAR; INTRAVENOUS
Status: DISCONTINUED | OUTPATIENT
Start: 2021-11-17 | End: 2021-11-18 | Stop reason: HOSPADM

## 2021-11-17 RX ORDER — NALBUPHINE HYDROCHLORIDE 10 MG/ML
2.5-5 INJECTION, SOLUTION INTRAMUSCULAR; INTRAVENOUS; SUBCUTANEOUS EVERY 6 HOURS PRN
Status: DISCONTINUED | OUTPATIENT
Start: 2021-11-17 | End: 2021-11-18

## 2021-11-17 RX ORDER — METOCLOPRAMIDE HYDROCHLORIDE 5 MG/ML
10 INJECTION INTRAMUSCULAR; INTRAVENOUS EVERY 6 HOURS PRN
Status: DISCONTINUED | OUTPATIENT
Start: 2021-11-17 | End: 2021-11-18 | Stop reason: HOSPADM

## 2021-11-17 RX ORDER — OXYTOCIN/0.9 % SODIUM CHLORIDE 30/500 ML
1-24 PLASTIC BAG, INJECTION (ML) INTRAVENOUS CONTINUOUS
Status: DISCONTINUED | OUTPATIENT
Start: 2021-11-17 | End: 2021-11-18 | Stop reason: HOSPADM

## 2021-11-17 RX ORDER — METOCLOPRAMIDE 10 MG/1
10 TABLET ORAL EVERY 6 HOURS PRN
Status: DISCONTINUED | OUTPATIENT
Start: 2021-11-17 | End: 2021-11-18 | Stop reason: HOSPADM

## 2021-11-17 RX ORDER — MISOPROSTOL 200 UG/1
400 TABLET ORAL
Status: DISCONTINUED | OUTPATIENT
Start: 2021-11-17 | End: 2021-11-18 | Stop reason: HOSPADM

## 2021-11-17 RX ORDER — FENTANYL/ROPIVACAINE/NS/PF 2MCG/ML-.1
PLASTIC BAG, INJECTION (ML) EPIDURAL
Status: DISCONTINUED | OUTPATIENT
Start: 2021-11-17 | End: 2021-11-18 | Stop reason: HOSPADM

## 2021-11-17 RX ORDER — MISOPROSTOL 200 UG/1
800 TABLET ORAL
Status: DISCONTINUED | OUTPATIENT
Start: 2021-11-17 | End: 2021-11-18 | Stop reason: HOSPADM

## 2021-11-17 RX ORDER — OXYTOCIN/0.9 % SODIUM CHLORIDE 30/500 ML
100-340 PLASTIC BAG, INJECTION (ML) INTRAVENOUS CONTINUOUS PRN
Status: DISCONTINUED | OUTPATIENT
Start: 2021-11-17 | End: 2021-11-18

## 2021-11-17 RX ORDER — TRANEXAMIC ACID 10 MG/ML
1 INJECTION, SOLUTION INTRAVENOUS EVERY 30 MIN PRN
Status: DISCONTINUED | OUTPATIENT
Start: 2021-11-17 | End: 2021-11-18 | Stop reason: HOSPADM

## 2021-11-17 RX ORDER — OXYTOCIN/0.9 % SODIUM CHLORIDE 30/500 ML
340 PLASTIC BAG, INJECTION (ML) INTRAVENOUS CONTINUOUS PRN
Status: DISCONTINUED | OUTPATIENT
Start: 2021-11-17 | End: 2021-11-18 | Stop reason: HOSPADM

## 2021-11-17 RX ORDER — OXYTOCIN 10 [USP'U]/ML
10 INJECTION, SOLUTION INTRAMUSCULAR; INTRAVENOUS
Status: DISCONTINUED | OUTPATIENT
Start: 2021-11-17 | End: 2021-11-18

## 2021-11-17 RX ORDER — PROCHLORPERAZINE MALEATE 10 MG
10 TABLET ORAL EVERY 6 HOURS PRN
Status: DISCONTINUED | OUTPATIENT
Start: 2021-11-17 | End: 2021-11-18 | Stop reason: HOSPADM

## 2021-11-17 RX ORDER — IBUPROFEN 600 MG/1
600 TABLET, FILM COATED ORAL
Status: DISCONTINUED | OUTPATIENT
Start: 2021-11-17 | End: 2021-11-18

## 2021-11-17 RX ORDER — CITRIC ACID/SODIUM CITRATE 334-500MG
30 SOLUTION, ORAL ORAL ONCE
Status: DISCONTINUED | OUTPATIENT
Start: 2021-11-17 | End: 2021-11-18 | Stop reason: HOSPADM

## 2021-11-17 RX ORDER — OXYTOCIN 10 [USP'U]/ML
10 INJECTION, SOLUTION INTRAMUSCULAR; INTRAVENOUS
Status: DISCONTINUED | OUTPATIENT
Start: 2021-11-17 | End: 2021-11-18 | Stop reason: HOSPADM

## 2021-11-17 RX ORDER — METHYLERGONOVINE MALEATE 0.2 MG/ML
200 INJECTION INTRAVENOUS
Status: DISCONTINUED | OUTPATIENT
Start: 2021-11-17 | End: 2021-11-18 | Stop reason: HOSPADM

## 2021-11-17 RX ORDER — LIDOCAINE 40 MG/G
CREAM TOPICAL
Status: DISCONTINUED | OUTPATIENT
Start: 2021-11-17 | End: 2021-11-18 | Stop reason: HOSPADM

## 2021-11-17 RX ADMIN — PENICILLIN G 3 MILLION UNITS: 3000000 INJECTION, SOLUTION INTRAVENOUS at 22:12

## 2021-11-17 RX ADMIN — PENICILLIN G POTASSIUM 5 MILLION UNITS: 5000000 POWDER, FOR SOLUTION INTRAMUSCULAR; INTRAPLEURAL; INTRATHECAL; INTRAVENOUS at 17:47

## 2021-11-17 RX ADMIN — SODIUM CHLORIDE, POTASSIUM CHLORIDE, SODIUM LACTATE AND CALCIUM CHLORIDE: 600; 310; 30; 20 INJECTION, SOLUTION INTRAVENOUS at 17:46

## 2021-11-17 RX ADMIN — Medication 2 MILLI-UNITS/MIN: at 19:52

## 2021-11-17 RX ADMIN — ACETAMINOPHEN 650 MG: 325 TABLET, FILM COATED ORAL at 20:02

## 2021-11-17 ASSESSMENT — ACTIVITIES OF DAILY LIVING (ADL)
FALL_HISTORY_WITHIN_LAST_SIX_MONTHS: NO
TOILETING_ISSUES: NO

## 2021-11-17 NOTE — H&P
History and Physical     Rach Peterson MRN# [id number]   YOB: 1990 Age: 31 year old      Date of Admission: 2021    Primary care provider: Claudine Garcia      Assessment and Plan:       31 year old  at 39w1d by 10w1d US, here for elective IOL. Pregnancy is notable for hx preeclampsia, HSV, GBS bacteruria.     # Induction of Labor  - Admit to labor and delivery   - SVE prn; Cervical exam on admission: /-1, will augment with pitocin and AROM  - Membranes: intact  - Labs: CBC, T&S, RPR  - GBS bacteruria; Antibiotics indicated, will start now  - Pain Control: Per patient request; Discussed options. Considering epidural  - Diet: Regular in early labor  - PPH Prophylaxis: No contraindications    # HSV  - PTA valtrex suppression  - BLE negative on admission    # Hx preeclampsia  - BP normal this pregnancy  - S/p ASA ppx  - Baseline HELLP labs wnl, repeat on admission    # PNC  - Rh pos, Rubella immune, GCT 63  - Other prenatal labs wnl  - Imaging: placenta posterior  - S/p flu, Tdap vaccines  - Contraception: nexplanon  - Feeding: bottle     # FWB:   Cat I tracing, reactive; ceph by BSUS; EFW 3600g  - Continuous Fetal Monitoring  - Intrauterine resuscitative measures prn      Patient discussed with Dr. Mendoza          HPI:     Rach Peterson is a 31 year old  at 39w1d by LMP c/w 10w1d US who presents today for elective induction of labor. Feeling well today, no concerns or questions. Intermittent contractions. No LOF or VB. Good fetal movement.    Pregnancy notable for:   - Hx preeclampsia with SF  - HSV  - GBS bacteruria    OB History:    OB History    Para Term  AB Living   4 1 1 0 2 1   SAB IAB Ectopic Multiple Live Births   1 1 0 0 1      # Outcome Date GA Lbr Tawanda/2nd Weight Sex Delivery Anes PTL Lv   4 Current            3 Term 20 40w1d 03:39 / 00:29 3.63 kg (8 lb) F Vag-Spont Local N XAVIER      Complications: GBS,  Preeclampsia/Hypertension      Name: YARY CARROLL      Apgar1: 9  Apgar5: 9   2 SAB 10/04/19           1 IAB 07/03/19 15w6d       FD      Birth Comments: 15 week D&E for fetal omphalocele        Prenatal Lab Results:  Lab Results   Component Value Date    ABO A 04/29/2021    RH Pos 04/29/2021    AS Neg 04/29/2021    HEPBANG Nonreactive 04/29/2021    CHPCRT Negative 05/28/2021    GCPCRT Negative 05/28/2021    HGB 11.6 (L) 08/20/2021                  Past Medical History:     Past Medical History:   Diagnosis Date     Anxiety     as adult, therapy, no meds or hosp     Carrier of group B Streptococcus 06/1/2020     Genital herpes 12/1/2011     Herpes simplex without mention of complication 12/1/2011     Hypertension 07/11/2020    Tied to pre-eclampsia, seems to have returned to normal     Knee pain, right      Syncope and collapse 2006    Cardiology workup with Dr Brady, wandering atrial pacer, no activity restritction and no need for abx prophylaxis (mild pulm flow murmur)             Past Surgical History:     Past Surgical History:   Procedure Laterality Date     DILATION AND EVACUATION N/A 7/3/2019    Procedure: Dilation And Evacuation;  Surgeon: Josefa Kwan MD;  Location: [facility] OR      REMOVE TONSILS/ADENOIDS,<13 Y/O      T & A <12y.o.             Social History:     Social History     Tobacco Use     Smoking status: Never Smoker     Smokeless tobacco: Never Used     Tobacco comment: Non smoking home   Substance Use Topics     Alcohol use: Not Currently     Comment: four drinks once every two weeks.             Family History:     Family History   Problem Relation Age of Onset     Hypertension Mother      No Known Problems Father      No Known Problems Brother      No Known Problems Maternal Grandmother      No Known Problems Maternal Grandfather      No Known Problems Paternal Grandmother      No Known Problems Paternal Grandfather      Cancer Maternal Aunt         Melanoma              Immunizations:     Immunization History   Administered Date(s) Administered     COVID-19,PF,Pfizer (12+ Yrs) 03/11/2021, 04/01/2021     DTaP, Unspecified 05/25/2018     Flu, Unspecified 09/11/2018, 10/10/2019     HEPA 05/18/2011     HPV 08/06/2007, 11/14/2007, 11/13/2008     HepB 11/20/2001, 02/24/2003, 08/12/2003     Hepatitis B Immunity: Titer 05/09/2019     Influenza (IIV3) PF 11/14/2007, 11/13/2008     Influenza Vaccine IM > 6 months Valent IIV4 (Alfuria,Fluzone) 10/10/2019, 11/01/2019, 11/13/2020, 09/22/2021     MMR 11/20/2001     Meningococcal (Menactra ) 08/06/2007     Rubella Immunity: Titer/md Dx 05/09/2019     TD (ADULT, 7+) 07/15/2002     TDAP Vaccine (Adacel) 05/25/2018     TDAP Vaccine (Boostrix) 08/06/2007, 04/17/2020     Tdap (Adacel,Boostrix) 09/22/2021     Typhoid IM 05/18/2011            Allergies:     Allergies   Allergen Reactions     No Known Drug Allergies              Medications:     Medications Prior to Admission   Medication Sig Dispense Refill Last Dose     aspirin (ASPIRIN ADULT LOW DOSE) 81 MG EC tablet 81 mg daily   11/17/2021 at Unknown time     Prenatal Vit-Fe Fumarate-FA (PRENATAL MULTIVITAMIN W/IRON) 27-0.8 MG tablet Take 1 tablet by mouth daily   11/17/2021 at Unknown time     valACYclovir (VALTREX) 500 MG tablet Take 1 tablet (500 mg) by mouth daily 90 tablet 3 11/17/2021 at Unknown time             Review of Systems & Physical Exam:     The Review of Systems is negative other than noted in the HPI      /79   LMP 02/16/2021 (Exact Date)   Gen: NAD  CV: RRR  Lungs: CTAB, non-labored breathing  Abd: Gravid, non-tender, non-distended  : Normal external genitalia, no lesions  Ext: Trace peripheral extremity edema    Cervix: 4/50/-1  Membranes: intact  Presentation: cephalic by BSUS  Estimated Fetal Weight: 3600g    FHT:  Monitoring External  FHT: Baseline 145 bpm; moderate variability; accels present; no decelerations  TOCO 3-4 contractions in 10 minutes         Data:      Labs:  CBC, T&S, RPR, HELLP labs on admission    Imaging:  OB US 6/25/21 - variable presentation, palcenta posterior, 3VC, CIPRIANO nl, anatomy wnl, EFW 42%  OB US 10/29 - cephalic presentation      Svitlana Vieyra MD  ObGyn, PGY-2  11/17/21 5:21 PM     I have seen and examined the patient without the resident. I have reviewed, edited, and agree with the note.   My findings are:appears well up on ball  EFM category I  TOCO q 4-5 min  abx on board, awaiting pitocin  Reviewed expected course and outcome.     Delia Shaw MD

## 2021-11-18 LAB — T PALLIDUM AB SER QL: NONREACTIVE

## 2021-11-18 PROCEDURE — 250N000011 HC RX IP 250 OP 636: Performed by: STUDENT IN AN ORGANIZED HEALTH CARE EDUCATION/TRAINING PROGRAM

## 2021-11-18 PROCEDURE — 722N000001 HC LABOR CARE VAGINAL DELIVERY SINGLE

## 2021-11-18 PROCEDURE — 258N000003 HC RX IP 258 OP 636: Performed by: STUDENT IN AN ORGANIZED HEALTH CARE EDUCATION/TRAINING PROGRAM

## 2021-11-18 PROCEDURE — 250N000013 HC RX MED GY IP 250 OP 250 PS 637: Performed by: STUDENT IN AN ORGANIZED HEALTH CARE EDUCATION/TRAINING PROGRAM

## 2021-11-18 PROCEDURE — 0HQ9XZZ REPAIR PERINEUM SKIN, EXTERNAL APPROACH: ICD-10-PCS | Performed by: OBSTETRICS & GYNECOLOGY

## 2021-11-18 PROCEDURE — 250N000013 HC RX MED GY IP 250 OP 250 PS 637: Performed by: OBSTETRICS & GYNECOLOGY

## 2021-11-18 PROCEDURE — 59400 OBSTETRICAL CARE: CPT | Mod: GC | Performed by: OBSTETRICS & GYNECOLOGY

## 2021-11-18 PROCEDURE — 250N000009 HC RX 250: Performed by: ANESTHESIOLOGY

## 2021-11-18 PROCEDURE — 120N000002 HC R&B MED SURG/OB UMMC

## 2021-11-18 RX ORDER — OXYTOCIN 10 [USP'U]/ML
10 INJECTION, SOLUTION INTRAMUSCULAR; INTRAVENOUS
Status: DISCONTINUED | OUTPATIENT
Start: 2021-11-18 | End: 2021-11-19 | Stop reason: HOSPADM

## 2021-11-18 RX ORDER — MISOPROSTOL 200 UG/1
800 TABLET ORAL
Status: DISCONTINUED | OUTPATIENT
Start: 2021-11-18 | End: 2021-11-19 | Stop reason: HOSPADM

## 2021-11-18 RX ORDER — TRANEXAMIC ACID 10 MG/ML
1 INJECTION, SOLUTION INTRAVENOUS EVERY 30 MIN PRN
Status: DISCONTINUED | OUTPATIENT
Start: 2021-11-18 | End: 2021-11-19 | Stop reason: HOSPADM

## 2021-11-18 RX ORDER — BUTALBITAL, ACETAMINOPHEN AND CAFFEINE 50; 325; 40 MG/1; MG/1; MG/1
1 TABLET ORAL ONCE
Status: COMPLETED | OUTPATIENT
Start: 2021-11-18 | End: 2021-11-18

## 2021-11-18 RX ORDER — OXYTOCIN/0.9 % SODIUM CHLORIDE 30/500 ML
340 PLASTIC BAG, INJECTION (ML) INTRAVENOUS CONTINUOUS PRN
Status: DISCONTINUED | OUTPATIENT
Start: 2021-11-18 | End: 2021-11-19 | Stop reason: HOSPADM

## 2021-11-18 RX ORDER — HYDROCORTISONE 2.5 %
CREAM (GRAM) TOPICAL 3 TIMES DAILY PRN
Status: DISCONTINUED | OUTPATIENT
Start: 2021-11-18 | End: 2021-11-19 | Stop reason: HOSPADM

## 2021-11-18 RX ORDER — IBUPROFEN 800 MG/1
800 TABLET, FILM COATED ORAL EVERY 6 HOURS PRN
Status: DISCONTINUED | OUTPATIENT
Start: 2021-11-18 | End: 2021-11-19 | Stop reason: HOSPADM

## 2021-11-18 RX ORDER — ONDANSETRON 2 MG/ML
4 INJECTION INTRAMUSCULAR; INTRAVENOUS EVERY 6 HOURS PRN
Status: DISCONTINUED | OUTPATIENT
Start: 2021-11-18 | End: 2021-11-19 | Stop reason: HOSPADM

## 2021-11-18 RX ORDER — ONDANSETRON 4 MG/1
4 TABLET, ORALLY DISINTEGRATING ORAL EVERY 6 HOURS PRN
Status: DISCONTINUED | OUTPATIENT
Start: 2021-11-18 | End: 2021-11-19 | Stop reason: HOSPADM

## 2021-11-18 RX ORDER — ACETAMINOPHEN 325 MG/1
650 TABLET ORAL EVERY 4 HOURS PRN
Status: DISCONTINUED | OUTPATIENT
Start: 2021-11-18 | End: 2021-11-18

## 2021-11-18 RX ORDER — ACETAMINOPHEN 325 MG/1
650 TABLET ORAL EVERY 4 HOURS PRN
Status: DISCONTINUED | OUTPATIENT
Start: 2021-11-18 | End: 2021-11-19 | Stop reason: HOSPADM

## 2021-11-18 RX ORDER — BUTALBITAL, ACETAMINOPHEN AND CAFFEINE 50; 325; 40 MG/1; MG/1; MG/1
1-2 TABLET ORAL EVERY 4 HOURS PRN
Status: DISCONTINUED | OUTPATIENT
Start: 2021-11-18 | End: 2021-11-19 | Stop reason: HOSPADM

## 2021-11-18 RX ORDER — MISOPROSTOL 200 UG/1
400 TABLET ORAL
Status: DISCONTINUED | OUTPATIENT
Start: 2021-11-18 | End: 2021-11-19 | Stop reason: HOSPADM

## 2021-11-18 RX ORDER — METHYLERGONOVINE MALEATE 0.2 MG/ML
200 INJECTION INTRAVENOUS
Status: DISCONTINUED | OUTPATIENT
Start: 2021-11-18 | End: 2021-11-19 | Stop reason: HOSPADM

## 2021-11-18 RX ORDER — BISACODYL 10 MG
10 SUPPOSITORY, RECTAL RECTAL DAILY PRN
Status: DISCONTINUED | OUTPATIENT
Start: 2021-11-18 | End: 2021-11-19 | Stop reason: HOSPADM

## 2021-11-18 RX ORDER — DOCUSATE SODIUM 100 MG/1
100 CAPSULE, LIQUID FILLED ORAL DAILY
Status: DISCONTINUED | OUTPATIENT
Start: 2021-11-18 | End: 2021-11-19 | Stop reason: HOSPADM

## 2021-11-18 RX ORDER — MODIFIED LANOLIN
OINTMENT (GRAM) TOPICAL
Status: DISCONTINUED | OUTPATIENT
Start: 2021-11-18 | End: 2021-11-19 | Stop reason: HOSPADM

## 2021-11-18 RX ORDER — KETOROLAC TROMETHAMINE 30 MG/ML
30 INJECTION, SOLUTION INTRAMUSCULAR; INTRAVENOUS
Status: COMPLETED | OUTPATIENT
Start: 2021-11-18 | End: 2021-11-18

## 2021-11-18 RX ORDER — CARBOPROST TROMETHAMINE 250 UG/ML
250 INJECTION, SOLUTION INTRAMUSCULAR
Status: DISCONTINUED | OUTPATIENT
Start: 2021-11-18 | End: 2021-11-19 | Stop reason: HOSPADM

## 2021-11-18 RX ORDER — BUTALBITAL, ACETAMINOPHEN AND CAFFEINE 50; 325; 40 MG/1; MG/1; MG/1
1-2 TABLET ORAL EVERY 4 HOURS PRN
Status: DISCONTINUED | OUTPATIENT
Start: 2021-11-18 | End: 2021-11-18

## 2021-11-18 RX ADMIN — PENICILLIN G 3 MILLION UNITS: 3000000 INJECTION, SOLUTION INTRAVENOUS at 02:05

## 2021-11-18 RX ADMIN — IBUPROFEN 800 MG: 800 TABLET, FILM COATED ORAL at 09:54

## 2021-11-18 RX ADMIN — Medication 10 ML: at 00:15

## 2021-11-18 RX ADMIN — BUTALBITAL, ACETAMINOPHEN AND CAFFEINE 1 TABLET: 50; 325; 40 TABLET ORAL at 01:04

## 2021-11-18 RX ADMIN — IBUPROFEN 800 MG: 800 TABLET, FILM COATED ORAL at 16:17

## 2021-11-18 RX ADMIN — ACETAMINOPHEN 650 MG: 325 TABLET, FILM COATED ORAL at 22:28

## 2021-11-18 RX ADMIN — ONDANSETRON 4 MG: 2 INJECTION INTRAMUSCULAR; INTRAVENOUS at 00:23

## 2021-11-18 RX ADMIN — SODIUM CHLORIDE, POTASSIUM CHLORIDE, SODIUM LACTATE AND CALCIUM CHLORIDE: 600; 310; 30; 20 INJECTION, SOLUTION INTRAVENOUS at 00:46

## 2021-11-18 RX ADMIN — ONDANSETRON 4 MG: 2 INJECTION INTRAMUSCULAR; INTRAVENOUS at 06:09

## 2021-11-18 RX ADMIN — ACETAMINOPHEN 650 MG: 325 TABLET, FILM COATED ORAL at 18:42

## 2021-11-18 RX ADMIN — BUTALBITAL, ACETAMINOPHEN AND CAFFEINE 2 TABLET: 50; 325; 40 TABLET ORAL at 14:41

## 2021-11-18 RX ADMIN — BUTALBITAL, ACETAMINOPHEN AND CAFFEINE 1 TABLET: 50; 325; 40 TABLET ORAL at 10:46

## 2021-11-18 RX ADMIN — KETOROLAC TROMETHAMINE 30 MG: 30 INJECTION, SOLUTION INTRAMUSCULAR at 03:45

## 2021-11-18 RX ADMIN — ACETAMINOPHEN 650 MG: 325 TABLET, FILM COATED ORAL at 09:03

## 2021-11-18 RX ADMIN — ACETAMINOPHEN 650 MG: 325 TABLET, FILM COATED ORAL at 05:11

## 2021-11-18 RX ADMIN — IBUPROFEN 800 MG: 800 TABLET, FILM COATED ORAL at 22:28

## 2021-11-18 RX ADMIN — DOCUSATE SODIUM 100 MG: 100 CAPSULE ORAL at 09:03

## 2021-11-18 RX ADMIN — Medication: at 01:05

## 2021-11-18 NOTE — PROGRESS NOTES
[facility]  Labor Progress Note    S:  Patient is feeling well, sitting on birthing ball. Started to feel contractions about an hour ago.    O:   /79   LMP 2021 (Exact Date)   SVE: deferred    FHT: Baseline 140, moderate variability, + accelerations, no decelerations  [place]: 2-3 contractions in 10 minutes    A/P:  Ms. Rach Peterson is a 31 year old  at 39w1d, here for elective IOL.    # labor  - /-1 on admission  - plan to start pitocin titration   - GBS pos- PCN started  - HSV: on valtrex, neg BLE on admission  - pain management: planning epidural in active labor    # FWB  - category I FHT, reactive and reassuring  - intrauterine resuscitation prn    Discussed with Dr. Colin Martinez MD  OB/GYN PGY-2  2021 7:48 PM

## 2021-11-18 NOTE — L&D DELIVERY NOTE
OB Vaginal Delivery Note    Rach Peterson MRN# [id number]   Age: 31 year old YOB: 1990       GA: 39w2d  GP:   Labor Complications: None   EBL:   mL  Delivery QBL: 251 mL  Delivery Type: Vaginal, Spontaneous   ROM to Delivery Time: (Delivered) Hours: 1 Minutes: 21  [place] Weight: 3.72 kg (8 lb 3.2 oz)    1 Minute 5 Minute 10 Minute   Apgar Totals: 8   9        GALLITO MARTINEZ;DANYA ROPER;SEDA ARRIAZA;PARI DEVINE     Rach Peterson is a 31 year old now  who presented at 39w2d for elective IOL.  Pregnancy was notable for history of preeclampsia w/SF in previous pregnancy, GBS bacteriuria, and HSV (on prophylaxis, bright light exam negative). She was 4cm on admission. Her induction began with pitocin. AROM was performed with clear fluid. FHT were category 1 during labor. She progressed to complete and pushed for about 10 minutes, after which she had a spontaneous vaginal delivery of a viable female infant in LAYO position.  A nuchal cord was noted around the neck and body, which were reduced after delivery.  Apgars of 8 and 9 with weight of 3720 grams.  The cord was double clamped after 60 seconds and cut.  A cord segment and cord blood were obtained.  30U of IV pitocin was started. The placenta was then delivered using gentle traction and suprapubic pressure.  The uterus was noted to be firm after fundal massage.  The perineum was assessed for lacerations and a small first degree laceration was noted. The laceration was repaired with a figure of X suture using 3-0 vicryl suture.  On final examination of the perineum, the repair was noted to be hemostatic.  Total QBL was 150mL.  The placenta appeared intact with a 3V umbilical cord.  Dr. Shaw was present for the entire procedure.     Gallito Martinez MD  OB/GYN PGY-2  2021 4:07 AM       Babs Peterson-Rach [[id number]]    Labor Event Times    Labor onset date: 21 Onset time: 11:30 PM   Dilation  complete date: 21 Complete time:  2:47 AM   Start pushing date/time: 2021 0253      Labor Events     labor?: No   steroids: None  Labor Type: Induction/Cervical ripening  Predominate monitoring during 1st stage: continuous electronic fetal monitoring     Antibiotics received during labor?: Yes  Reason for Antibiotics: GBS  Antibiotics received for GBS: Penicillin  Antibiotics Given (GBS): Greater than 4 hours prior to delivery     Rupture date/time: 21 0138   Rupture type: Artificial Rupture of Membranes  Fluid color: Clear  Fluid odor: Normal     Induction date/time: 21   Cervical ripening date/time:     Indications for induction: Elective     Augmentation: Oxytocin, AROM  Indications for augmentation: Ineffective Contraction Pattern  1:1 continuous labor support provided by?: RN Labor partogram used?: no      Delivery/Placenta Date and Time    Delivery Date: 21 Delivery Time:  2:59 AM   Placenta Date/Time: 2021  3:04 AM  Delivering clinician: Delia Shaw MD   Other personnel present at delivery:  Provider Role   Kingston Martinez MD Resident   Fay Matute RN Registered Nurse   Aggie Patel Nursing Student   Aliza Hills RN Registered Nurse         Vaginal Counts     Initial count performed by 2 team members:  Two Team Members   Dr. Colin Matute RN       Needles Suture Needles Sponges (RETIRED) Instruments   Initial counts 2  5    Added to count  1     Relief counts       Final counts 2 1 5          Placed during labor Accounted for at the end of labor   FSE No NA   IUPC No NA   Cervadil No NA              Final count performed by 2 team members:  Two Team Members   Dr. Colin Matute RN      Final count correct?: Yes     Apgars    Living status: Living   1 Minute 5 Minute 10 Minute 15 Minute 20 Minute   Skin color: 1  1       Heart rate: 2  2       Reflex irritability: 2  2       Muscle tone: 2  2       Respiratory effort:  "1  2       Total: 8  9       Apgars assigned by: ACE HILLS RN     Cord    Vessels: 3 Vessels    Cord Complications: Nuchal   Nuchal Intervention: delivered through         Nuchal cord description: loose nuchal cord         Cord Blood Disposition: Lab    Gases Sent?: No    Delayed cord clamping?: Yes    Cord Clamping Delay (seconds):  seconds    Stem cell collection?: No        Resuscitation    Methods: None  [place] Care at Delivery: Female infant born with spontaneous cry, placed on mothers abdomen, delayed cord clamping. Stimulated, dried, placed on mothers chest, warm blankets and hat applied.    ACE Hills RN 21 0307     [place] Measurements    Weight: 8 lb 3.2 oz Length: 1' 9.5\"   Head circumference: 34.9 cm       Skin to Skin and Feeding Plan    Skin to skin initiation date/time: 1841    Skin to skin with: Mother  Skin to skin end date/time:        Labor Events and Shoulder Dystocia    Fetal Tracing Prior to Delivery: Category 1  Shoulder dystocia present?: Neg     Delivery (Maternal) (Provider to Complete) (450463)    Episiotomy: None  Perineal lacerations: 1st Repaired?: Yes   Repair suture: 3-0 Vicryl  Genital tract inspection done: Pos     Blood Loss  Mother: Rach Peterson #[id number]   Start of Mother's Information    Delivery Blood Loss  21 2330 - 21 0407    Delivery QBL (mL) Hospital Encounter 251 mL    Total  251 mL         End of Mother's Information  Mother: Rach Peterson #[id number]          Delivery - Provider to Complete (219362)    Delivering clinician: Delia Shaw MD  Attempted Delivery Types (Choose all that apply): Spontaneous Vaginal Delivery  Delivery Type (Choose the 1 that will go to the Birth History): Vaginal, Spontaneous                   Other personnel:  Provider Role   Kingston Martinez MD Resident   Fay Matute RN Registered Nurse   Aggie Patel Nursing Student   Aliza Hills RN Registered Nurse       "          Placenta    Date/Time: 11/18/2021  3:04 AM  Removal: Spontaneous  Disposition: Hospital disposal           Anesthesia    Method: Epidural  Cervical dilation at placement: 4-7                Presentation and Position    Presentation: Vertex    Position: Right Occiput Anterior                 Kingston Martinez MD     I was present for and supervised the entire delivery and repair.   Delia Shaw MD

## 2021-11-18 NOTE — PLAN OF CARE
VSS. Fundus midline, firm, and U/4. Lochia scant to light. Voiding without difficulty. Bonding well with . Has had a headache whenever sitting up or out of bed, only relief is when lying down. Tylenol, ibuprofen, fluids, rest, caffeine, and butalbitol-acetaminophen-caffeine were given with no improvement. Anaesthesia consulted. Continue with plan of care.

## 2021-11-18 NOTE — PROGRESS NOTES
PPD#0    S: Dealing with spinal headache. Ok when lying flat Evaluated earlier by anesthesia- patient wanting to try expectant management to start. Bleeding is light. Otherwise doing well.     O:   Vitals:    11/18/21 0449 11/18/21 0600 11/18/21 0958   BP: 122/66 120/70 113/76   Pulse:  70 65   Resp: 18 18 16   Temp:  98.2  F (36.8  C) 97.7  F (36.5  C)   SpO2: 97% 98%      General: No distress  Abdomen: Soft, non-tender  Extremities: non-tender, no edema    A/P: 31 year old  PPD#0 with spinal headache    Will try fioricet and caffeine for now  If no improvement plan for blood patch    Josefa Kwan MD

## 2021-11-18 NOTE — PLAN OF CARE
Vaginal Delivery Note   of viable Female with Dr. Shaw in attendance.  Nursery RN ACE Hills RN present.  Infant with spontaneous cry, to mother's abdomen, dried and stimulated.  APGAR at 1 minute:  8.  Placenta delivered with out complication, postpartum pitocin started per orders, 1st degree laceration, with repair, ca cares provided.  Mother and baby in stable condition.

## 2021-11-18 NOTE — ANESTHESIA PREPROCEDURE EVALUATION
Anesthesia Pre-Procedure Evaluation    Patient: Rach Peterson   MRN: [id number] : 1990        Preoperative Diagnosis: labor pain    Procedure : epidural           Past Medical History:   Diagnosis Date     Anxiety     as adult, therapy, no meds or hosp     Carrier of group B Streptococcus 2020     Genital herpes 2011     Herpes simplex without mention of complication 2011     Hypertension 2020    Tied to pre-eclampsia, seems to have returned to normal     Knee pain, right      Syncope and collapse     Cardiology workup with Dr Brady, wandering atrial pacer, no activity restritction and no need for abx prophylaxis (mild pulm flow murmur)      Past Surgical History:   Procedure Laterality Date     DILATION AND EVACUATION N/A 7/3/2019    Procedure: Dilation And Evacuation;  Surgeon: Josefa Kwan MD;  Location: [facility] OR     HC REMOVE TONSILS/ADENOIDS,<13 Y/O      T & A <12y.o.      Allergies   Allergen Reactions     No Known Drug Allergies       Social History     Tobacco Use     Smoking status: Never Smoker     Smokeless tobacco: Never Used     Tobacco comment: Non smoking home   Substance Use Topics     Alcohol use: Not Currently     Comment: four drinks once every two weeks.      Wt Readings from Last 1 Encounters:   21 80.6 kg (177 lb 9.6 oz)        Anesthesia Evaluation            ROS/MED HX  ENT/Pulmonary:       Neurologic:       Cardiovascular:     (+) --PIH and BP Meds ---    METS/Exercise Tolerance:     Hematologic:       Musculoskeletal:       GI/Hepatic:       Renal/Genitourinary:       Endo:       Psychiatric/Substance Use:       Infectious Disease:       Malignancy:       Other:            Physical Exam    Airway        Mallampati: I   TM distance: > 3 FB   Neck ROM: full   Mouth opening: > 3 cm    Respiratory Devices and Support         Dental  no notable dental history         Cardiovascular   cardiovascular exam normal          Pulmonary   pulmonary  exam normal                OUTSIDE LABS:  CBC:   Lab Results   Component Value Date    WBC 8.8 2021    WBC 7.0 2021    HGB 12.1 2021    HGB 11.6 (L) 2021    HCT 35.5 2021    HCT 35.1 2021     2021     2021     BMP:   Lab Results   Component Value Date     2020     07/10/2020    POTASSIUM 4.2 2020    POTASSIUM 4.0 07/10/2020    CHLORIDE 107 2020    CHLORIDE 107 07/10/2020    CO2 25 2020    CO2 21 07/10/2020    BUN 13 2020    BUN 15 07/10/2020    CR 0.67 2021    CR 0.91 2020    GLC 95 2020     (H) 07/10/2020     COAGS: No results found for: PTT, INR, FIBR  POC:   Lab Results   Component Value Date    BGM 72 2019    HCG Positive (A) 2019     HEPATIC:   Lab Results   Component Value Date    ALBUMIN 4.3 2020    PROTTOTAL 7.9 2020    ALT 16 2021    AST 13 2021    ALKPHOS 87 2020    BILITOTAL 0.6 2020     OTHER:   Lab Results   Component Value Date    ZACKERY 9.4 2020    TSH 0.76 2010    T4 0.98 2010       Anesthesia Plan    ASA Status:  2      Anesthesia Type: Epidural.   Induction: N/a.   Maintenance: N/A.        Consents    Anesthesia Plan(s) and associated risks, benefits, and realistic alternatives discussed. Questions answered and patient/representative(s) expressed understanding.    - Discussed:     - Discussed with:  Patient         Postoperative Care    Pain management: Neuraxial analgesia.        Comments:    Other Comments: 31 year old   F  at 39w1d by 10w1d US, presenting for elective IOL. Pregnancy is notable for hx preeclampsia, HSV, and GBS bacteruria.             Estrada Jackson MD

## 2021-11-18 NOTE — PROGRESS NOTES
Data: Patient presented to [facility] at 1454.   Reason for maternal/fetal assessment per patient is Induction Of Labor    Patient is a . Prenatal record reviewed.      OB History    Para Term  AB Living   4 1 1 0 2 1   SAB IAB Ectopic Multiple Live Births   1 1 0 0 1      # Outcome Date GA Lbr Tawanda/2nd Weight Sex Delivery Anes PTL Lv   4 Current            3 Term 20 40w1d 03:39 / 00:29 3.63 kg (8 lb) F Vag-Spont Local N XAVIER      Complications: GBS, Preeclampsia/Hypertension      Name: DARIA CARROLL-DORITA      Apgar1: 9  Apgar5: 9   2 SAB 10/04/19           1 IAB 19 15w6d       FD      Birth Comments: 15 week D&E for fetal omphalocele   . Medical history:   Past Medical History:   Diagnosis Date     Anxiety     as adult, therapy, no meds or hosp     Carrier of group B Streptococcus 2020     Genital herpes 2011     Herpes simplex without mention of complication 2011     Hypertension 2020    Tied to pre-eclampsia, seems to have returned to normal     Knee pain, right      Syncope and collapse     Cardiology workup with Dr Brady, wandering atrial pacer, no activity restritction and no need for abx prophylaxis (mild pulm flow murmur)   Gestational Age 39w1d. VSS. Fetal movement present. Patient denies cramping, backache, vaginal discharge, pelvic pressure, UTI symptoms, GI problems, bloody show, vaginal bleeding, edema, headache, visual disturbances, epigastric or URQ pain, abdominal pain, rupture of membranes. Support person,  Russell, is present. Pt taking valtrex for hx HSV.   Action: Verbal consent for EFM. Admission assessment completed. EFM applied for fetal well being assessment. Uterine assessment: ctx spontaneously every 3-4 minutes, pt not feeling per report. Fetal assessment: Cat I. Presumed adequate fetal oxygenation documented (see flow record).   Response: Dr. Vieyra, G2 informed of patient arrival. BSUS confirmed cephalic position. Bright  light exam for hx HSV, WNL. SVE 4/60/-1. Plan per provider is start pitocin for labor augmentation and PCN for GBS pos status. Patient verbalized agreement with plan. Patient oriented to room and call light.    ADDENDUM @ 0414: Writer updated Dr. Vieyra. Due to unit staffing, RN will need to hold pitocin initiation at this time. Will plan to start PCN for GBS pos status. Will plan to start pitocin as soon as possible, likely during oncoming 1900 shift. Pt continues to ctx spontaneously every 3-5 minutes.

## 2021-11-18 NOTE — PROVIDER NOTIFICATION
11/17/21 3432   Provider Notification   Provider Name/Title RJ Martinez   Method of Notification At Bedside     Dr. Martinez G2 and medical student at bedside for oncoming/evening rounds. Pt offers no complaints at this time. She is starting to feel her contractions, but they remain manageable. Up using birth ball. Will plan to start pitocin with oncoming shift.

## 2021-11-18 NOTE — ANESTHESIA PROCEDURE NOTES
Epidural catheter Procedure Note    Pre-Procedure   Staff -        Anesthesiologist:  Vikram Degroot MD       Resident/Fellow: Estrada Jackson MD       Performed By: resident       Location: OB       Procedure Start/Stop Times: 11/18/2021 12:07 PM and 11/18/2021 12:12 PM       Pre-Anesthestic Checklist: patient identified, IV checked, risks and benefits discussed, informed consent, monitors and equipment checked, pre-op evaluation, at physician/surgeon's request and post-op pain management  Timeout:       Correct Patient: Yes        Correct Procedure: Yes        Correct Site: Yes        Correct Position: Yes   Procedure Documentation  Procedure: epidural catheter       Patient Position: sitting      Local skin infiltrated with mL of 1% lidocaine.        Insertion Site: L4-5. (midline approach).       Technique: LORT air        MADHAVI at 7 cm.       Needle Type: [employer] needle       Needle Gauge: 17.        Needle Length (Inches): 3.5        Catheter: 19 G.         Catheter threaded easily.         5 cm epidural space.         Threaded 12 cm at skin.         # of attempts: 2 and  # of redirects:  0    Assessment/Narrative         Paresthesias: Yes.      Test dose of 5 mL lidocaine 1.5% w/ 1:200,000 epinephrine at.         Test dose negative, 3 minutes after injection, for signs of intravascular, subdural, or intrathecal injection.       Insertion/Infusion Method: LORT air       Aspiration negative for Heme or CSF via Epidural Catheter.    Medication(s) Administered   0.125% bupivacaine (Epidural), 10 mL  Medication Administration Time: 11/18/2021 12:15 AM     Comments:  Wet tap on initial attempt, needle withdrawn immediately; successful epidural on repuncture at the same site.

## 2021-11-18 NOTE — PROVIDER NOTIFICATION
Still has a headache.  Headache is better when laying flat.  Nausea is better, vomiting has subsided with Zofran IV.    Anesthesia consult?  Please advise.    Thank you!     11/18/21 0658   Provider Notification   Provider Name/Title tangela   Method of Notification Electronic Page   Request Evaluate-Remote   Notification Reason Medication Request       LATE ENTRY:  Provider called back and will consult with anesthesiology.

## 2021-11-18 NOTE — PROVIDER NOTIFICATION
11/18/21 1211   Provider Notification   Provider Name/Title RJ Vieyra   Method of Notification Electronic Page   Request Evaluate-Remote   Notification Reason Status Update   Patient states medication has not helped headache. Cannot even sit in chair to hold baby. Patient is requesting blood patch.

## 2021-11-18 NOTE — PLAN OF CARE
Patient given cool compresses and cool washcloth.  Patient did an oral swish and spit.  Patient given Zofran IV. Patient is resting at this time.  Continue plan of care.

## 2021-11-18 NOTE — PROVIDER NOTIFICATION
Can you please enter orders for nausea? She has vomitted 2 more times since arrival on [facility].  Thank you     11/18/21 0555   Provider Notification   Provider Name/Title tangela   Method of Notification Electronic Page   Request Evaluate-Remote   Notification Reason Medication Request

## 2021-11-18 NOTE — PROGRESS NOTES
[facility]  Labor Progress Note    S: Patient is feeling well, was a little more uncomfortable earlier, now she thinks contractions have spaced a little. Otherwise feeling well    O:   /81   Temp 97.9  F (36.6  C) (Oral)   Resp 18   LMP 2021 (Exact Date)     Gen:  Alert, NAD  SVE: 480/-1    FHT: Baseline 135, moderate variability, + accelerations, no decelerations  [place]: 3-4 contractions in 10 minutes    A/P:  Ms. Rach Peterson is a 31 year old  at 39w1d, here for elective IOL.    # labor  - continue pitocin titration  - GBS pos- PCN started, adequately treated  - HSV: on valtrex, neg BLE on admission  - pain management: planning epidural  - discussed options including AROM. She would like to get an epidural prior to AROM, will plan to get epidural now    # FWB  - category I FHT, reactive and reassuring  - intrauterine resuscitation prn    Kingston Martinez MD  OB/GYN PGY-2  2021 11:20 PM

## 2021-11-18 NOTE — PLAN OF CARE
Pt continues to labor spontaneously at this point. VSS, afebrile. Pt denies any H/A, epigastric pain, or vision changes. FHR Cat I. Ctx every 3-5 minutes, palpate mild. Pt reports she is now starting to feel them, but they are still manageable. First dose of PCN started at 1747 for pos GBS status. . Pt anxious about epidural window, encouraged communication with nursing staff to help reassure patient. Using birth ball currently and coping well. , Tim is at the bedside, supportive. Plan to start pitocin at 1930 and possible AROM to continue labor augmentation. Report to Charlotte BELLO RN. Care relinquished.

## 2021-11-18 NOTE — PLAN OF CARE
Data: Rach Peterson transferred to [place] [id number] via wheelchair at 0513. Baby transferred via parent's arms.  Action: Receiving unit notified of transfer: Yes. Patient and family notified of room change. Report given to ACE Pa RN  at 0513. Belongings sent to receiving unit. Accompanied by Registered Nurse. Oriented patient to surroundings. Call light within reach. ID bands double-checked with receiving RN.  Response: Patient tolerated transfer and is stable.

## 2021-11-18 NOTE — PROGRESS NOTES
[facility]  Labor Progress Note    S: Patient is feeling well, comfortable after her epidural. Had some emesis, now improved. Also had a headache, improved after tylenol.     O:   /74   Temp 97.9  F (36.6  C) (Oral)   Resp 18   LMP 2021 (Exact Date)   SpO2 96%     Gen:  Alert, NAD  SVE: 4-5/80/-1, AROM w/clear fluid    FHT: Baseline 135, moderate variability, + accelerations, no decelerations  [place]: 4 contractions in 10 minutes    A/P:  Ms. Rach Peterson is a 31 year old  at 39w1d, here for elective IOL.    # labor  - continue pitocin titration  - s/p AROM  - GBS pos- PCN started, adequately treated  - HSV: on valtrex, neg BLE on admission  - pain management: epidural in place, working well    # FWB  - category I FHT, reactive and reassuring  - intrauterine resuscitation prn    Kingston Martinez MD  OB/GYN PGY-2  2021 1:40 AM

## 2021-11-18 NOTE — DISCHARGE SUMMARY
[facility] Discharge Summary    Rach Peterson MRN# [id number]   Age: 31 year old YOB: 1990     Date of Admission:  2021  Date of Discharge::  21   Admitting Physician:  Jessica Mendoza MD  Discharge Physician:  Nikkie Hernandez MD         Admission Diagnoses:   -   - IUP at 39w2d  - history of preeclampsia w/SF  - GBS positive  - HSV          Discharge Diagnosis:   - Postpartum, s/p delivery of viable infant  - Spinal headache         Procedures:     Procedure(s): - Normal spontaneous vaginal delivery  - Epidural  - Spinal blood patch               Medications Prior to Admission:     Medications Prior to Admission   Medication Sig Dispense Refill Last Dose     Prenatal Vit-Fe Fumarate-FA (PRENATAL MULTIVITAMIN W/IRON) 27-0.8 MG tablet Take 1 tablet by mouth daily   2021 at Unknown time     valACYclovir (VALTREX) 500 MG tablet Take 1 tablet (500 mg) by mouth daily 90 tablet 3 2021 at Unknown time     [DISCONTINUED] aspirin (ASPIRIN ADULT LOW DOSE) 81 MG EC tablet 81 mg daily   2021 at Unknown time             Discharge Medications:        Review of your medicines      START taking      Dose / Directions   acetaminophen 325 MG tablet  Commonly known as: TYLENOL  Used for:  (normal spontaneous vaginal delivery)      Dose: 650 mg  Take 2 tablets (650 mg) by mouth every 6 hours as needed for mild pain Start after Delivery.  Quantity: 100 tablet  Refills: 0     ibuprofen 600 MG tablet  Commonly known as: ADVIL/MOTRIN  Used for:  (normal spontaneous vaginal delivery)      Dose: 600 mg  Take 1 tablet (600 mg) by mouth every 6 hours as needed for moderate pain Start after delivery  Quantity: 60 tablet  Refills: 0     senna-docusate 8.6-50 MG tablet  Commonly known as: SENOKOT-S/PERICOLACE  Used for:  (normal spontaneous vaginal delivery)      Dose: 1 tablet  Take 1 tablet by mouth daily Start after delivery.  Quantity: 100 tablet  Refills:  0        CONTINUE these medicines which have NOT CHANGED      Dose / Directions   prenatal multivitamin w/iron 27-0.8 MG tablet      Dose: 1 tablet  Take 1 tablet by mouth daily  Refills: 0     valACYclovir 500 MG tablet  Commonly known as: VALTREX  Used for: Herpes simplex type 2 infection      Dose: 500 mg  Take 1 tablet (500 mg) by mouth daily  Quantity: 90 tablet  Refills: 3        STOP taking    Aspirin Adult Low Dose 81 MG EC tablet  Generic drug: aspirin              Where to get your medicines      These medications were sent to [place], [place] - [address]  [address] [address]    Phone: [phone]     acetaminophen 325 MG tablet    ibuprofen 600 MG tablet    senna-docusate 8.6-50 MG tablet               Consultations:   Anesthesiology          Brief History of Admission and Labor:   Rach Peterson is a 31 year old now  who presented at 39w2d for elective IOL.  Pregnancy was notable for history of preeclampsia w/SF in previous pregnancy, GBS bacteriuria, and HSV (on prophylaxis, bright light exam negative). She was 4cm on admission. Her induction began with pitocin. AROM was performed with clear fluid. FHT were category 1 during labor. She progressed to complete and pushed for about 10 minutes, after which she had a spontaneous vaginal delivery of a viable female infant in LAYO position.  A nuchal cord was noted around the neck and body, which were reduced after delivery.  Apgars of 8 and 9 with weight of 3720 grams.  The cord was double clamped after 60 seconds and cut.  A cord segment and cord blood were obtained.  30U of IV pitocin was started. The placenta was then delivered using gentle traction and suprapubic pressure.  The uterus was noted to be firm after fundal massage.  The perineum was assessed for lacerations and a small first degree laceration was noted. The laceration was repaired with a figure of X suture using 3-0 vicryl  suture.  On final examination of the perineum, the repair was noted to be hemostatic.  Total QBL was 150mL.  The placenta appeared intact with a 3V umbilical cord.          Postpartum Hospital Course:   The patient's postpartum course was notable for development of severe positional headache consistent with spinal headache. It was refractory to conservative management, so on PPD#1 she received blood patch procedure by anesthesia. Her HA improved.  On discharge, her pain was well controlled. Vaginal bleeding is similar to peak menstrual flow.  Voiding without difficulty.  Ambulating well and tolerating a normal diet.  No fever.  Bottle feeding well.  Infant is stable.  She was discharged on post-partum day #1.    Post-partum hemoglobin: 11.9    Contraception: TBD    Rh pos, rhogam was not indicated          Discharge Instructions and Follow-Up:     Discharge diet: Regular   Discharge activity: Activity as tolerated   Discharge follow-up: Follow up with your primary OBGYN provider in six weeks for a routine postpartum visit                Discharge Disposition:     Discharged to home      Attestation:  I have reviewed today's vital signs, notes, medications, labs and imaging.    Svitlana Vieyra MD  ObGyn, PGY-2  11/19/21 10:13 AM

## 2021-11-18 NOTE — PROGRESS NOTES
Post  Anesthesia Follow Up Note    Patient: Rach Peterson    Patient location: Postpartum floor.    Chief complaint: Acute postoperative pain management s/p epidural administration     Procedure(s) Performed:      Anesthesia type: Epidural    Subjective:     Pain Control: 0/10 at rest and 9/10 with ambulation    Additional ROS:  She does not complain of pruritis at this time. She denies weakness, denies paresthesia, denies difficulties breathing or voiding, denies nausea or vomiting. She is not able to ambulate due to headache when in the upright position and tolerates regular diet.    Objective:    Respiratory Function (RR / SpO2 / Airway Patency): Satisfactory    Cardiac Function (HR / Rhythm / BP): Satisfactory    Strength and sensation lower extremities: Normal    Site of spinal/epidural insertion: No signs of infection or inflammation.     Last Vitals: /70   Pulse 70   Temp 36.8  C (98.2  F) (Oral)   Resp 18   LMP 2021 (Exact Date)   SpO2 98%   Breastfeeding Unknown     Assessment and plan:   Rach Peterson is a 31 year old female  POD #1 s/p   with epidural.  Pt is ambulating with difficulty, no weakness or paresthesias.  There is evidence of possible PDPH given the patients presentation. Discussed conservative management including hydration, caffeine, tylenol, and that typically these symptoms can be self resolving.  At this time, the patient wishes to try conservative management and denies any invasive procedures at this time, although she is amenable to a blood patch if symptoms persist. However, we further anticipate that the patient will require opioid/nonopioid analgesics for visceral and muscle pain that is not controlled with local anesthetic for incisional pain.     In brief summary, her post-operative analgesia is tolerable today and we will continue to observe her. Further interventional analgesic strategies would be of little utility at this  time. Thus, we recommend proceeding with PO analgesics including staggered dosing of NSAIDs (ibuprofen) and acetaminophen, with a taper of oxycodone.     Thank you for including us in the care for this patient.    Claude Massey DO  Anesthesia Resident, PGY3    Patient re-evaluated at 1400 by me, Doretha Olivarez. Headache is very positional. 0/10 lying down 10/10 upright within about a minute. Spoke with patient about delaying blood patch until tomorrow to increase success rate of blood patch.    Discussed risks benefits options of conservative treatment versus epidural blood patch. With conservative treatment headache will usually improve with time as the hole usually eventually seals on its own. However, this would likely be expected to take several days to a week or two. Risks of blood patch including possibility of creating another puncture and needing to delay the blood patch procedure, or very rare complications such as unrecognized injection of blood into spinal canal leading to severe nerve injury or infection possible but very unlikely. Warned patient about common side effect of mild back pain, muscle spasms, possible need for another blood patch, and rare complications such as bleeding infection or nerve injury.     Patient wished to have blood patch early tomorrow. Will try to coordinate with OB nursing availability.    Doretha Olivarez MD

## 2021-11-18 NOTE — PLAN OF CARE
Data: Rach Peterson transferred to [place] [address] via wheelchair at 0513. Baby transferred via parent's arms.  Action: Receiving unit notified of transfer: Yes. Patient and family notified of room change. Report given by Fay BELLO RN at 0500. Belongings sent to receiving unit. Accompanied by Registered Nurse. Oriented patient to surroundings. Call light within reach. ID bands double-checked with receiving RN.  Response: Patient tolerated transfer and is stable.  Patient has vomitted twice and complains of a headache at this time.

## 2021-11-19 ENCOUNTER — ANESTHESIA EVENT (OUTPATIENT)
Dept: OBSTETRICS | Facility: CLINIC | Age: 31
End: 2021-11-19
Payer: COMMERCIAL

## 2021-11-19 ENCOUNTER — ANESTHESIA (OUTPATIENT)
Dept: OBSTETRICS | Facility: CLINIC | Age: 31
End: 2021-11-19
Payer: COMMERCIAL

## 2021-11-19 VITALS
OXYGEN SATURATION: 99 % | DIASTOLIC BLOOD PRESSURE: 81 MMHG | SYSTOLIC BLOOD PRESSURE: 122 MMHG | TEMPERATURE: 98 F | HEART RATE: 65 BPM | RESPIRATION RATE: 16 BRPM

## 2021-11-19 LAB — HGB BLD-MCNC: 11.9 G/DL (ref 11.7–15.7)

## 2021-11-19 PROCEDURE — 250N000013 HC RX MED GY IP 250 OP 250 PS 637: Performed by: OBSTETRICS & GYNECOLOGY

## 2021-11-19 PROCEDURE — 36415 COLL VENOUS BLD VENIPUNCTURE: CPT | Performed by: STUDENT IN AN ORGANIZED HEALTH CARE EDUCATION/TRAINING PROGRAM

## 2021-11-19 PROCEDURE — 370N000003 HC ANESTHESIA WARD SERVICE

## 2021-11-19 PROCEDURE — 250N000013 HC RX MED GY IP 250 OP 250 PS 637: Performed by: STUDENT IN AN ORGANIZED HEALTH CARE EDUCATION/TRAINING PROGRAM

## 2021-11-19 PROCEDURE — 85018 HEMOGLOBIN: CPT | Performed by: STUDENT IN AN ORGANIZED HEALTH CARE EDUCATION/TRAINING PROGRAM

## 2021-11-19 RX ORDER — AMOXICILLIN 250 MG
1 CAPSULE ORAL DAILY
Qty: 100 TABLET | Refills: 0 | Status: ON HOLD | OUTPATIENT
Start: 2021-11-19 | End: 2021-11-23

## 2021-11-19 RX ORDER — FENTANYL CITRATE-0.9 % NACL/PF 10 MCG/ML
100 PLASTIC BAG, INJECTION (ML) INTRAVENOUS EVERY 5 MIN PRN
Status: DISCONTINUED | OUTPATIENT
Start: 2021-11-19 | End: 2021-11-19 | Stop reason: HOSPADM

## 2021-11-19 RX ORDER — IBUPROFEN 600 MG/1
600 TABLET, FILM COATED ORAL EVERY 6 HOURS PRN
Qty: 60 TABLET | Refills: 0 | Status: SHIPPED | OUTPATIENT
Start: 2021-11-19 | End: 2022-05-16

## 2021-11-19 RX ORDER — NALOXONE HYDROCHLORIDE 0.4 MG/ML
0.2 INJECTION, SOLUTION INTRAMUSCULAR; INTRAVENOUS; SUBCUTANEOUS
Status: DISCONTINUED | OUTPATIENT
Start: 2021-11-19 | End: 2021-11-19 | Stop reason: HOSPADM

## 2021-11-19 RX ORDER — ACETAMINOPHEN 325 MG/1
650 TABLET ORAL EVERY 6 HOURS PRN
Qty: 100 TABLET | Refills: 0 | Status: SHIPPED | OUTPATIENT
Start: 2021-11-19 | End: 2022-05-16

## 2021-11-19 RX ORDER — NALOXONE HYDROCHLORIDE 0.4 MG/ML
0.4 INJECTION, SOLUTION INTRAMUSCULAR; INTRAVENOUS; SUBCUTANEOUS
Status: DISCONTINUED | OUTPATIENT
Start: 2021-11-19 | End: 2021-11-19 | Stop reason: HOSPADM

## 2021-11-19 RX ORDER — NALBUPHINE HYDROCHLORIDE 10 MG/ML
2.5-5 INJECTION, SOLUTION INTRAMUSCULAR; INTRAVENOUS; SUBCUTANEOUS EVERY 6 HOURS PRN
Status: DISCONTINUED | OUTPATIENT
Start: 2021-11-19 | End: 2021-11-19 | Stop reason: HOSPADM

## 2021-11-19 RX ADMIN — ACETAMINOPHEN 650 MG: 325 TABLET, FILM COATED ORAL at 05:16

## 2021-11-19 RX ADMIN — ACETAMINOPHEN 650 MG: 325 TABLET, FILM COATED ORAL at 14:11

## 2021-11-19 RX ADMIN — ACETAMINOPHEN 650 MG: 325 TABLET, FILM COATED ORAL at 09:19

## 2021-11-19 RX ADMIN — IBUPROFEN 800 MG: 800 TABLET, FILM COATED ORAL at 11:32

## 2021-11-19 RX ADMIN — IBUPROFEN 800 MG: 800 TABLET, FILM COATED ORAL at 05:16

## 2021-11-19 NOTE — PLAN OF CARE
Data: Vital signs within normal limits. Postpartum checks within normal limits - see flow record. Patient still has a headache and is very uncomfortable.  She is pleasant and cooperative.  Patient is bonding with the baby and doing skin to skin. Patient eating and drinking normally. Patient able to empty bladder independently and is up ambulating. No apparent signs of infection. Perineum healing well. Patient performing self cares and is able to care for infant.  Action: Patient medicated during the shift for pain. See MAR. Patient reassessed within 1 hour after each medication and pain was NOT improved - patient stated she was very uncomfortable. Patient education done about blood patch. See flow record.  Response: Positive attachment behaviors observed with infant. Support persons was present.   Plan: Anticipate discharge.

## 2021-11-19 NOTE — ANESTHESIA CARE TRANSFER NOTE
Patient: Rach Peterson    Procedure: * No procedures listed *       Diagnosis: * No pre-op diagnosis entered *  Diagnosis Additional Information: No value filed.    Anesthesia Type:   Epidural     Note:    Oropharynx: oropharynx clear of all foreign objects  Level of Consciousness: awake  Oxygen Supplementation: room air    Independent Airway: airway patency satisfactory and stable  Dentition: dentition unchanged  Vital Signs Stable: post-procedure vital signs reviewed and stable    Patient transferred to: Labor and Delivery  Comments: VSS. Patient experienced immediate relief of headache after blood patch was administered. Denies pain, n/v.   Handoff Report: Identifed the Patient, Identified the Reponsible Provider, Reviewed the pertinent medical history, Discussed the surgical course, Reviewed Intra-OP anesthesia mangement and issues during anesthesia, Set expectations for post-procedure period and Allowed opportunity for questions and acknowledgement of understanding      Vitals:  Vitals Value Taken Time   /79 11/19/21 0915   Temp     Pulse     Resp 16 11/19/21 0915   SpO2 98 % 11/19/21 0915       Electronically Signed By: Claude Massey MD  November 19, 2021  9:17 AM

## 2021-11-19 NOTE — ANESTHESIA PROCEDURE NOTES
Blood Patch:    Staff -        Anesthesiologist:  Rose Reaves MD       Resident/Fellow: Claude Massey MD       Performed By: resident       Patient location during procedure: Pre-op  Preanesthetic Checklist:        Completed: patient identified, pre-op evaluation, timeout performed, IV checked, risks and benefits discussed, monitors and equipment checked and anesthesia consent given    Procedure Information:        Location of venous blood draw: arm       Volume of blood injected: 20 mL       Patient position: sitting       Prep: Chloraprep       Sterile Barriers: gloves, washed/disinfected hands, mask and drape       Patient monitoring: EKG, continuous pulse oximetry and blood pressure monitoring       Approach: midline       Injection technique: MADHAVI saline and MADHAVI air       Location: L3-4       Injection method: Touhy needle       Needle gauge: 18 G       Needle length (cm): 9 cm       Loss of resistance depth: 7 cm    Assessment:       Reason for Blood Patch: spinal headache

## 2021-11-19 NOTE — PROGRESS NOTES
Postpartum Progress Note  Rach Peterson  [id number]    Subjective:   Rach's headache is still bothering her. It is mild when laying down, but is severe when sitting up or ambulating. It is not improved with any of the medications. She is hoping to have a blood patch this AM. Otherwise doing fine. No abdominal pain. No dizziness. Tolerating PO intake without nausea or vomiting. Lochia is similar to a period. Voiding spontaneously. No vision changes, CP, SOB, RUQ pain.     Objective:  /80   Pulse 64   Temp 97.9  F (36.6  C) (Oral)   Resp 16   LMP 2021 (Exact Date)   SpO2 98%   Breastfeeding Unknown     General: NAD, comfortable, laying flat  Heart: Regular rate, extremities warm and well-perfused  Lungs: Breathing comfortably, on room air  Abdomen: Soft, non-tender, non-distended; fundus firm below umbilicus  Extremities: no edema in BLE    Assessment/Plan: 31 year old  who is PPD#1 s/p . Pregnancy was notable for hx of preeclampsia w/SF in prev preg. Now with a spinal headache, otherwise doing well.     # spinal headache  - s/p fioricet, caffeine, fluids  - desires blood patch today.     # Postpartum  - Continue routine post-partum cares.     - Heme: Appropriate blood loss during delivery. AM hgb pending. Continue to monitor for s/s of anemia. Repeat labs prn.  - Pain: Continue PRN tylenol and ibuprofen  - GI: PRN senna, miralax, simethicone, and anti-emetics.  - : Voiding spontaneously   - Baby:  Stable, bottle feeding  - Contraception: Not discussed  - Rh pos, Rubella immune  - PPx: Encourage ambulation    Dispo: Discharge to home when meeting postpartum goals    Kingston Martinez MD  OB/GYN PGY-2  2021 2:34 AM

## 2021-11-19 NOTE — PLAN OF CARE
Patient discharged to home with baby and . All discharge instructions reviewed and copy sent home with patient along with discharge meds.

## 2021-11-19 NOTE — ANESTHESIA PREPROCEDURE EVALUATION
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      Allergies   Allergen Reactions     No Known Drug Allergies       Social History     Tobacco Use     Smoking status: Never Smoker     Smokeless tobacco: Never Used     Tobacco comment: Non smoking home   Substance Use Topics     Alcohol use: Not Currently     Comment: four drinks once every two weeks.      Wt Readings from Last 1 Encounters:   21 80.6 kg (177 lb 9.6 oz)        Anesthesia Evaluation   Pt has had prior anesthetic. Type: Regional.        ROS/MED HX  ENT/Pulmonary:  - neg pulmonary ROS     Neurologic:  - neg neurologic ROS     Cardiovascular:     (+) hypertension--PIH and BP Meds ---    METS/Exercise Tolerance:     Hematologic:  - neg hematologic  ROS     Musculoskeletal:  - neg musculoskeletal ROS     GI/Hepatic:  - neg GI/hepatic ROS     Renal/Genitourinary:  - neg Renal ROS     Endo:  - neg endo ROS     Psychiatric/Substance Use:  - neg psychiatric ROS     Infectious Disease:  - neg infectious disease ROS     Malignancy:  - neg malignancy ROS     Other:             Physical Exam    Airway        Mallampati: I   TM distance: > 3 FB   Neck ROM: full   Mouth opening: > 3 cm    Respiratory Devices and Support         Dental  no notable dental history         Cardiovascular   cardiovascular exam normal          Pulmonary   pulmonary exam normal                OUTSIDE LABS:  CBC:   Lab Results   Component Value Date    WBC 8.8 11/17/2021    WBC 7.0 08/20/2021    HGB 11.9 11/19/2021    HGB 12.1 11/17/2021    HCT 35.5 11/17/2021    HCT 35.1 08/20/2021     11/17/2021     08/20/2021     BMP:   Lab Results   Component Value Date     09/08/2020     07/10/2020    POTASSIUM 4.2 09/08/2020    POTASSIUM 4.0 07/10/2020    CHLORIDE 107 09/08/2020    CHLORIDE 107 07/10/2020    CO2 25 09/08/2020    CO2 21 07/10/2020    BUN 13 09/08/2020    BUN 15 07/10/2020    CR 0.67 08/20/2021    CR 0.91 09/08/2020    GLC 95 09/08/2020     (H) 07/10/2020     COAGS: No results found for: PTT, INR, FIBR  POC:   Lab Results   Component Value Date    BGM 72 07/03/2019    HCG Positive (A) 04/26/2019     HEPATIC:   Lab Results   Component Value Date    ALBUMIN 4.3 09/08/2020    PROTTOTAL 7.9 09/08/2020    ALT 16 04/29/2021    AST 13 04/29/2021    ALKPHOS 87 09/08/2020    BILITOTAL 0.6 09/08/2020     OTHER:   Lab Results   Component Value Date    ZACKERY 9.4 09/08/2020    TSH 0.76 05/11/2010    T4 0.98 05/11/2010       Anesthesia Plan    ASA Status:  2   NPO Status:  NPO Appropriate    Anesthesia Type: Epidural.   Induction: N/a.   Maintenance: N/A.        Consents    Anesthesia Plan(s) and associated risks, benefits, and realistic alternatives discussed. Questions answered and patient/representative(s) expressed understanding.    - Discussed:     - Discussed with:  Patient      - Extended Intubation/Ventilatory Support Discussed: No.      - Patient is DNR/DNI Status: No    Use of blood products discussed: No .     Postoperative Care    Pain management: Neuraxial analgesia.         Comments:    Other Comments: Patient with headache after labor epidural.Pregnancy is notable for hx preeclampsia, HSV, and GBS bacteruria.                 Fish Perez Junior, MD

## 2021-11-19 NOTE — PROGRESS NOTES
Daily rounding, patient desires discharge home this afternoon. MD reviewed discharge instructions.

## 2021-11-19 NOTE — PLAN OF CARE
Pt arrived back to [facility] after blood patch procedure. VSS and patient states her pain is almost completely gone. Only a slight headache.

## 2021-11-19 NOTE — PLAN OF CARE
VSS, fundus firm, lochia scant, pt continues to have spinal H/A.  Excruciating H/A when sitting up or ambulating- most comfortable laying flat. Ice packs, Tylenol and Ibuprofen given for pain when available.  Voiding w/o difficulty, bonding well with infant- doing lots of skin to skin, bottle feeding infant formula.  , Russell and bedside and very supportive.  EDS score 2, BC completed. Pt is able to make needs known, continue with education and POC. Pt hoping to have Blood Patch procedure in the morning.

## 2021-11-19 NOTE — PLAN OF CARE
D: Patient scheduled for blood patch this morning ASAP, transferred patient to L/D Pacu for procedure supine on cart. Reports 3/10 pain in forehad when supine, sitting up immediately 9/10.  I:  Anesthesia team reviewed procedure, EConsent completed. Blood patch completed at 0858.   A: Patient tolerated procedure well, reported almost instant pain relief.   P: complete VS Q 15 X 4 and transfer back to postpartum if stable. Patient to be supine as much as possible today, ok to get up to bathroom, sit to eat. She should not do any lifting, bending over or bearing down. Instructed patient on activity restrictions and to report increased pain, N/V. Pt verbalized understanding.  Transferred to 2461 by cart, hand off at bedside to NAEEM Cain RN at 1015,

## 2021-11-20 ENCOUNTER — HOSPITAL ENCOUNTER (OUTPATIENT)
Facility: CLINIC | Age: 31
Discharge: HOME | End: 2021-11-21
Attending: OBSTETRICS & GYNECOLOGY | Admitting: OBSTETRICS & GYNECOLOGY
Payer: COMMERCIAL

## 2021-11-20 ENCOUNTER — TELEPHONE (OUTPATIENT)
Dept: OBGYN | Facility: CLINIC | Age: 31
End: 2021-11-20
Payer: COMMERCIAL

## 2021-11-20 ENCOUNTER — ANESTHESIA (OUTPATIENT)
Dept: OBGYN | Facility: CLINIC | Age: 31
End: 2021-11-20
Payer: COMMERCIAL

## 2021-11-20 ENCOUNTER — ANESTHESIA EVENT (OUTPATIENT)
Dept: OBGYN | Facility: CLINIC | Age: 31
End: 2021-11-20
Payer: COMMERCIAL

## 2021-11-20 DIAGNOSIS — F41.9 ANXIETY: Primary | ICD-10-CM

## 2021-11-20 PROCEDURE — 62273 INJECT EPIDURAL PATCH: CPT

## 2021-11-20 PROCEDURE — 370N000003 HC ANESTHESIA WARD SERVICE

## 2021-11-20 PROCEDURE — 258N000003 HC RX IP 258 OP 636

## 2021-11-20 PROCEDURE — 96360 HYDRATION IV INFUSION INIT: CPT

## 2021-11-20 RX ORDER — SODIUM CHLORIDE, SODIUM LACTATE, POTASSIUM CHLORIDE, CALCIUM CHLORIDE 600; 310; 30; 20 MG/100ML; MG/100ML; MG/100ML; MG/100ML
INJECTION, SOLUTION INTRAVENOUS
Status: COMPLETED
Start: 2021-11-20 | End: 2021-11-21

## 2021-11-20 RX ORDER — LIDOCAINE 40 MG/G
CREAM TOPICAL
Status: CANCELLED | OUTPATIENT
Start: 2021-11-20

## 2021-11-20 RX ORDER — SODIUM CHLORIDE, SODIUM LACTATE, POTASSIUM CHLORIDE, CALCIUM CHLORIDE 600; 310; 30; 20 MG/100ML; MG/100ML; MG/100ML; MG/100ML
INJECTION, SOLUTION INTRAVENOUS ONCE
Status: CANCELLED | OUTPATIENT
Start: 2021-11-20

## 2021-11-20 RX ADMIN — SODIUM CHLORIDE, POTASSIUM CHLORIDE, SODIUM LACTATE AND CALCIUM CHLORIDE 999 ML/HR: 600; 310; 30; 20 INJECTION, SOLUTION INTRAVENOUS at 23:13

## 2021-11-20 NOTE — TELEPHONE ENCOUNTER
"Pt called in stating she has a bad headache. Delivered 11/18 early morning with an epidural. Had a \"spinal headache\" afterwards and was given a  Blood patch which relieved the headache.  Is fatigued and is wondering what to do as headache has returned.  Offered that I call anesthesia to inquire about another patch vs laying down and resting.  [facility] elects to nap and will call back if headache worsens.    Katelynn Massey MD    Attempted tp call pt back and did not get an answer.  Called anesthesia and they may reach out to the pt. MDA said itis rare for a blood patch to need to be redone.    Katelynn Massey MD      "

## 2021-11-21 VITALS
RESPIRATION RATE: 16 BRPM | DIASTOLIC BLOOD PRESSURE: 72 MMHG | OXYGEN SATURATION: 98 % | TEMPERATURE: 97.8 F | SYSTOLIC BLOOD PRESSURE: 134 MMHG | HEART RATE: 85 BPM

## 2021-11-21 PROCEDURE — 62273 INJECT EPIDURAL PATCH: CPT

## 2021-11-21 PROCEDURE — 250N000013 HC RX MED GY IP 250 OP 250 PS 637: Performed by: REGISTERED NURSE

## 2021-11-21 PROCEDURE — 96360 HYDRATION IV INFUSION INIT: CPT

## 2021-11-21 PROCEDURE — G0463 HOSPITAL OUTPT CLINIC VISIT: HCPCS | Mod: 25

## 2021-11-21 RX ORDER — HYDROXYZINE HYDROCHLORIDE 50 MG/1
50 TABLET, FILM COATED ORAL ONCE
Status: COMPLETED | OUTPATIENT
Start: 2021-11-21 | End: 2021-11-21

## 2021-11-21 RX ORDER — HYDROXYZINE HYDROCHLORIDE 25 MG/1
50 TABLET, FILM COATED ORAL EVERY 6 HOURS PRN
Qty: 30 TABLET | Refills: 0 | Status: SHIPPED | OUTPATIENT
Start: 2021-11-21 | End: 2022-05-16

## 2021-11-21 RX ADMIN — HYDROXYZINE HYDROCHLORIDE 50 MG: 50 TABLET, FILM COATED ORAL at 00:33

## 2021-11-21 NOTE — DISCHARGE INSTRUCTIONS
"Blood Patch Information Sheet    An autologous epidural \"blood patch\" procedure is a treatment available for post lumbar puncture headaches.     The headache is caused by a continuous small leak of cerebrospinal fluid from the original puncture site.     In about 90% of the cases, symptoms are relieved by the application of an autologous \"blood patch.\" Autologous refers to your own blood.     The procedure is performed by an anesthesiologist in the post anesthesia care unit. If the anesthesiologist determines that it is needed, an IV may be inserted in your arm. The intravenous fluid will help to increase total body fluids, therefore increasing cerebrospinal fluid production and reducing the headache.     The anesthesiologist will locate the epidural space in your spine. A nurse will withdraw a blood sample from your arm. The anesthesiologist will inject the blood into the epidural space, sealing the leak and preventing further leakage of cerebrospinal fluid. Patients usually experience some relief from symptoms immediately.      Blood Patch Discharge Instructions      You may resume your normal activities and diet following the procedure.      You may experience minimal back stiffness and backache.      Observe the injection site. You may experience minimal bruising and bleeding at the site.        Notify your referring physician and/or department of anesthesia at [facility] [phone] if you experience severe headache, severe back pain, neck pain, fever, or leg weakness.      "

## 2021-11-21 NOTE — ANESTHESIA POSTPROCEDURE EVALUATION
Patient: Rach Peterson    Procedure: * No procedures listed *       Diagnosis:* No pre-op diagnosis entered *  Diagnosis Additional Information: No value filed.    Anesthesia Type:  Other (see Comments)    Note:  Disposition: Outpatient   Postop Pain Control:    PONV: No   Neuro/Psych: Uneventful            Sign Out: Acceptable/Baseline neuro status   Airway/Respiratory: Uneventful            Sign Out: Acceptable/Baseline resp. status   CV/Hemodynamics: Uneventful            Sign Out: Acceptable CV status; No obvious hypovolemia; No obvious fluid overload   Other NRE: NONE   DID A NON-ROUTINE EVENT OCCUR? No    Event details/Postop Comments:  0/10 headache laying down. Significant improvement. No new focal n/t/w. Will hydrate with 1L LR, bandaide the back, and okay for discharge after 1 hr.    Advised the patient that if she developed severe debilitating headache again, new depressed level of consciousness, focal numbness, weakhess, double vision, severe N/V that she needs to present to the ER for further evaluation.               Last vitals:  Vitals Value Taken Time   /82 11/20/21 2315   Temp     Pulse     Resp     SpO2 99 % 11/20/21 2316   Vitals shown include unvalidated device data.    Electronically Signed By: Luiz Merrill MD  November 20, 2021  11:19 PM

## 2021-11-21 NOTE — ANESTHESIA PROCEDURE NOTES
Blood Patch:    Staff -        Anesthesiologist:  Luiz Merrill MD       Performed By: anesthesiologist       Patient location during procedure: Pre-op       Start time: 11/20/2021 10:52 PM       End time: 11/20/2021 10:58 PM  Preanesthetic Checklist:        Completed: patient identified, pre-op evaluation, timeout performed, IV checked, risks and benefits discussed, monitors and equipment checked and anesthesia consent given    Procedure Information:        Location of venous blood draw: arm       Volume of blood injected: 20 mL       Blood collected by assistant using sterile technique.       Patient position: sitting       Sterile Barriers: gloves, mask, washed/disinfected hands and drape       Patient monitoring: continuous pulse oximetry and blood pressure monitoring       Approach: midline       Injection technique: MADHAVI saline       Location: L4-5       Injection method: Touhy needle       Needle gauge: 17 G       Needle length (cm): 9 cm       Loss of resistance depth: 6 cm    Assessment:       Events: well tolerated       Reason for Blood Patch: spinal headache   Comments:  Immediate relief with 20 ml injection of sterile blood. Laid flat. Plan to rest for 1 hr, vitals q10, 1L LR bolus. Single attempt procedure. No loss of CSF through touhy.

## 2021-11-21 NOTE — ANESTHESIA PREPROCEDURE EVALUATION
Anesthesia Pre-Procedure Evaluation    Patient: Rach Peterson   MRN: [id number] : 1990        Preoperative Diagnosis: * No surgery found *    Procedure :           Past Medical History:   Diagnosis Date     Anxiety     as adult, therapy, no meds or hosp     Carrier of group B Streptococcus 2020     Genital herpes 2011     Herpes simplex without mention of complication 2011     Hypertension 2020    Tied to pre-eclampsia, seems to have returned to normal     Knee pain, right      Syncope and collapse     Cardiology workup with Dr Brady, wandering atrial pacer, no activity restritction and no need for abx prophylaxis (mild pulm flow murmur)      Past Surgical History:   Procedure Laterality Date     DILATION AND EVACUATION N/A 7/3/2019    Procedure: Dilation And Evacuation;  Surgeon: Josefa Kwan MD;  Location: [facility]     HC REMOVE TONSILS/ADENOIDS,<13 Y/O      T & A <13y.o.      Allergies   Allergen Reactions     No Known Drug Allergies       Social History     Tobacco Use     Smoking status: Never Smoker     Smokeless tobacco: Never Used     Tobacco comment: Non smoking home   Substance Use Topics     Alcohol use: Not Currently     Comment: four drinks once every two weeks.      Wt Readings from Last 1 Encounters:   21 80.6 kg (177 lb 9.6 oz)        Anesthesia Evaluation   Pt has had prior anesthetic. Type: Regional.    No history of anesthetic complications       ROS/MED HX  ENT/Pulmonary:       Neurologic:       Cardiovascular:       METS/Exercise Tolerance:     Hematologic:       Musculoskeletal:       GI/Hepatic:       Renal/Genitourinary:       Endo:       Psychiatric/Substance Use:       Infectious Disease:       Malignancy:       Other:            Physical Exam    Airway        Mallampati: II   TM distance: > 3 FB   Neck ROM: full   Mouth opening: > 3 cm    Respiratory Devices and Support         Dental  no notable dental history         Cardiovascular           Rhythm and rate: regular     Pulmonary                   OUTSIDE LABS:  CBC:   Lab Results   Component Value Date    WBC 8.8 11/17/2021    WBC 7.0 08/20/2021    HGB 11.9 11/19/2021    HGB 12.1 11/17/2021    HCT 35.5 11/17/2021    HCT 35.1 08/20/2021     11/17/2021     08/20/2021     BMP:   Lab Results   Component Value Date     09/08/2020     07/10/2020    POTASSIUM 4.2 09/08/2020    POTASSIUM 4.0 07/10/2020    CHLORIDE 107 09/08/2020    CHLORIDE 107 07/10/2020    CO2 25 09/08/2020    CO2 21 07/10/2020    BUN 13 09/08/2020    BUN 15 07/10/2020    CR 0.67 08/20/2021    CR 0.91 09/08/2020    GLC 95 09/08/2020     (H) 07/10/2020     COAGS: No results found for: PTT, INR, FIBR  POC:   Lab Results   Component Value Date    BGM 72 07/03/2019    HCG Positive (A) 04/26/2019     HEPATIC:   Lab Results   Component Value Date    ALBUMIN 4.3 09/08/2020    PROTTOTAL 7.9 09/08/2020    ALT 16 04/29/2021    AST 13 04/29/2021    ALKPHOS 87 09/08/2020    BILITOTAL 0.6 09/08/2020     OTHER:   Lab Results   Component Value Date    ZACKERY 9.4 09/08/2020    TSH 0.76 05/11/2010    T4 0.98 05/11/2010       Anesthesia Plan    ASA Status:  3   - Procedure: Procedure only, no anesthetic delivered   NPO Status:  NPO Appropriate    Anesthesia Type: EBP.              Consents    Anesthesia Plan(s) and associated risks, benefits, and realistic alternatives discussed. Questions answered and patient/representative(s) expressed understanding.    - Discussed:     - Discussed with:  Patient      - Extended Intubation/Ventilatory Support Discussed: No.      - Patient is DNR/DNI Status: No    Use of blood products discussed: No .     Postoperative Care            Comments:    Other Comments: Plan for epidural blood patch     patient post partum day 2. Known dural puncture. Had EBP on PPD 1 with ~6 hrs of relief. Now with debilitating positional headache again, tough to care for her baby. Denies any tinnitus, double  vision, focal n/t/w. Not on blood thinners. Only take ibuprofen and aleve x1.      Risk of repeat dural puncture, failed symptom relief, bleeding, infection, nerve damage from blood in spine, all discussed.    Advised patient with as much bed rest for 1-2 days as able following procedure ideal.       Luiz Merrill MD

## 2021-11-21 NOTE — PLAN OF CARE
"Pt received 900mL in LR bolus per Dr. Merrill anesthesia. Pts' VS were checked Q10 minutes after blood patch procedure and were in normal limits. Pts' headache was completely relieved by the time of discharge and she had no other complaints of pain. Pt, however, has not had any sleep (no more than 4 hours total) since her baby was born and she feels as though she has out of the normal range of postpartum anxiety and depression that she never felt with her first child. Pt feels that she needs something for sleep and that she may need something for her PPA/PPD. BARRETT Garcia placed a one time order for vistaril before pt discharges home and sent script to her pharmacy if she feels like she needs more to get restful sleep PP. Zenaida also noted that she will have her nurses call her on Monday and try to get her scheduled next week for a tele visit to discuss her PPA/PPD. Pt very happy with conversation and is thankful that she \"finally feels heard\" and that she was \"taken seriously\". Pt discharged home with mother and says that her  at home is also very supportive and helpful. Discussed with pt that our hospital is always a safe place to come back if she feels like her symptoms of PPA/PPD are getting unmanageable at home. Pt sent home with instructions on post blood patch activity stable with mother at 0032.  "

## 2021-11-21 NOTE — PLAN OF CARE
Patient resting in supine position, states headache is better.  IV infusing, will continue to monitor and will notify provider if there is a change in status.

## 2021-11-21 NOTE — PROGRESS NOTES
Requested by RN to see patient regarding concern for severe postpartum anxiety. She was seen in triage [facility] for spinal headache that was treated with a blood patch. She reports that her pain is much improved, but she has not slept more than four hours since the birth of her baby on 21. She endorses feeling severe anxiety that she she attributes lack of sleep and to the constant pain she was in prior to the blood patch and the fear that the pain will come back or be unresolved indefinitely. She states she did not have this experience with her last delivery and is concerned with the way she is feeling. She endorses being well supported by her  who is home with the  and her other child and her mother who is currently at her bedside. She is agreeable to trying vistaril for both sleep and anxiety now, and a prescription was sent to her pharmacy of choice to  tomorrow and continue until f/u can be made on Monday.     Reinforced that if anxiety worsens or the vistaril doesn't help that the hospital/24hr phone is a safe place to call and check in. Message sent to RN pool to call patient on Monday and check in and possibly get scheduled for a mood check/phone visit asap.     GUANAKO Lucas CNM

## 2021-11-21 NOTE — ANESTHESIA CARE TRANSFER NOTE
Patient: Rach Peterson    Procedure: * No procedures listed *       Diagnosis: * No pre-op diagnosis entered *  Diagnosis Additional Information: No value filed.    Anesthesia Type:   Other (see Comments)     Note:    Oropharynx: oropharynx clear of all foreign objects  Level of Consciousness: awake      Independent Airway: airway patency satisfactory and stable  Dentition: dentition unchanged  Vital Signs Stable: post-procedure vital signs reviewed and stable  Report to RN Given: handoff report given  Patient transferred to: Labor and Delivery    Handoff Report: Identifed the Patient, Identified the Reponsible Provider, Reviewed the pertinent medical history, Discussed the surgical course, Reviewed Intra-OP anesthesia mangement and issues during anesthesia, Set expectations for post-procedure period and Allowed opportunity for questions and acknowledgement of understanding      Vitals:  Vitals Value Taken Time   /80 11/20/21 2306   Temp     Pulse     Resp     SpO2 97 % 11/20/21 2311   Vitals shown include unvalidated device data.    Electronically Signed By: Luiz Merrill MD  November 20, 2021  11:14 PM

## 2021-11-22 ENCOUNTER — TELEPHONE (OUTPATIENT)
Dept: OBGYN | Facility: CLINIC | Age: 31
End: 2021-11-22
Payer: COMMERCIAL

## 2021-11-22 ASSESSMENT — ANXIETY QUESTIONNAIRES
GAD7 TOTAL SCORE: 21
8. IF YOU CHECKED OFF ANY PROBLEMS, HOW DIFFICULT HAVE THESE MADE IT FOR YOU TO DO YOUR WORK, TAKE CARE OF THINGS AT HOME, OR GET ALONG WITH OTHER PEOPLE?: EXTREMELY DIFFICULT
GAD7 TOTAL SCORE: 21
GAD7 TOTAL SCORE: 21
5. BEING SO RESTLESS THAT IT IS HARD TO SIT STILL: NEARLY EVERY DAY
2. NOT BEING ABLE TO STOP OR CONTROL WORRYING: NEARLY EVERY DAY
7. FEELING AFRAID AS IF SOMETHING AWFUL MIGHT HAPPEN: NEARLY EVERY DAY
4. TROUBLE RELAXING: NEARLY EVERY DAY
7. FEELING AFRAID AS IF SOMETHING AWFUL MIGHT HAPPEN: NEARLY EVERY DAY
6. BECOMING EASILY ANNOYED OR IRRITABLE: NEARLY EVERY DAY
1. FEELING NERVOUS, ANXIOUS, OR ON EDGE: NEARLY EVERY DAY
3. WORRYING TOO MUCH ABOUT DIFFERENT THINGS: NEARLY EVERY DAY

## 2021-11-22 NOTE — TELEPHONE ENCOUNTER
Pt states that she has been unable to sleep.  She has continuous anxiety since her blood patch.  She has had migraines which she normally does not have them.  She was able to sleep after the Vistaril was prescribed.  This writer advised pt to take the Vistaril as prescribed by CNNAEEM and see provider regarding PP issues.  Pt agreed to plan.  Pt to see Dr Kwan 11/23/21.  This writer advised safety protocol to go to the emergency room if feeling like harming herself or others.  Pt verbalized understanding and agreed to plan.

## 2021-11-22 NOTE — TELEPHONE ENCOUNTER
----- Message from GUANAKO Tomlinson CNM sent at 11/21/2021 12:38 AM CST -----  Regarding: Urgent follow up  Hi,  I saw this patient briefly in triage. She is an [facility] MD patient who received a blood patch, but was experiencing severe anxiety. I prescribed her some vistaril, but she needs to be called to check in on her mood and get in with a provider asap. I will be in clinic on Monday and would be fine if you connected with me after speaking with her.   Thank you so much!  GUANAKO Lucas CNM

## 2021-11-23 ENCOUNTER — HOSPITAL ENCOUNTER (OUTPATIENT)
Facility: CLINIC | Age: 31
Discharge: HOME | End: 2021-11-23
Attending: OBSTETRICS & GYNECOLOGY | Admitting: OBSTETRICS & GYNECOLOGY
Payer: COMMERCIAL

## 2021-11-23 ENCOUNTER — APPOINTMENT (OUTPATIENT)
Dept: MRI IMAGING | Facility: CLINIC | Age: 31
End: 2021-11-23
Attending: OBSTETRICS & GYNECOLOGY
Payer: COMMERCIAL

## 2021-11-23 ENCOUNTER — OFFICE VISIT (OUTPATIENT)
Dept: OBGYN | Facility: CLINIC | Age: 31
End: 2021-11-23
Attending: OBSTETRICS & GYNECOLOGY
Payer: COMMERCIAL

## 2021-11-23 ENCOUNTER — HOSPITAL ENCOUNTER (OUTPATIENT)
Facility: CLINIC | Age: 31
End: 2021-11-23
Attending: OBSTETRICS & GYNECOLOGY | Admitting: OBSTETRICS & GYNECOLOGY
Payer: COMMERCIAL

## 2021-11-23 ENCOUNTER — TELEPHONE (OUTPATIENT)
Dept: OBGYN | Facility: CLINIC | Age: 31
End: 2021-11-23
Payer: COMMERCIAL

## 2021-11-23 ENCOUNTER — HOSPITAL ENCOUNTER (OUTPATIENT)
Facility: CLINIC | Age: 31
End: 2021-11-23
Admitting: OBSTETRICS & GYNECOLOGY
Payer: COMMERCIAL

## 2021-11-23 VITALS — DIASTOLIC BLOOD PRESSURE: 76 MMHG | BODY MASS INDEX: 22.53 KG/M2 | WEIGHT: 157 LBS | SYSTOLIC BLOOD PRESSURE: 128 MMHG

## 2021-11-23 VITALS — RESPIRATION RATE: 18 BRPM | TEMPERATURE: 97.9 F | DIASTOLIC BLOOD PRESSURE: 69 MMHG | SYSTOLIC BLOOD PRESSURE: 126 MMHG

## 2021-11-23 DIAGNOSIS — G97.1 HEADACHE AFTER SPINAL PUNCTURE: Primary | ICD-10-CM

## 2021-11-23 DIAGNOSIS — F41.9 ANXIETY: ICD-10-CM

## 2021-11-23 DIAGNOSIS — G97.1 HEADACHE AFTER SPINAL PUNCTURE: ICD-10-CM

## 2021-11-23 PROCEDURE — 96361 HYDRATE IV INFUSION ADD-ON: CPT

## 2021-11-23 PROCEDURE — 255N000002 HC RX 255 OP 636: Performed by: OBSTETRICS & GYNECOLOGY

## 2021-11-23 PROCEDURE — 96360 HYDRATION IV INFUSION INIT: CPT

## 2021-11-23 PROCEDURE — 72158 MRI LUMBAR SPINE W/O & W/DYE: CPT | Mod: 26 | Performed by: RADIOLOGY

## 2021-11-23 PROCEDURE — 70553 MRI BRAIN STEM W/O & W/DYE: CPT

## 2021-11-23 PROCEDURE — A9585 GADOBUTROL INJECTION: HCPCS | Performed by: OBSTETRICS & GYNECOLOGY

## 2021-11-23 PROCEDURE — 72156 MRI NECK SPINE W/O & W/DYE: CPT

## 2021-11-23 PROCEDURE — 72157 MRI CHEST SPINE W/O & W/DYE: CPT | Mod: 26 | Performed by: RADIOLOGY

## 2021-11-23 PROCEDURE — 96374 THER/PROPH/DIAG INJ IV PUSH: CPT

## 2021-11-23 PROCEDURE — 72157 MRI CHEST SPINE W/O & W/DYE: CPT

## 2021-11-23 PROCEDURE — 70553 MRI BRAIN STEM W/O & W/DYE: CPT | Mod: 26 | Performed by: RADIOLOGY

## 2021-11-23 PROCEDURE — 99214 OFFICE O/P EST MOD 30 MIN: CPT | Mod: 24 | Performed by: OBSTETRICS & GYNECOLOGY

## 2021-11-23 PROCEDURE — 72158 MRI LUMBAR SPINE W/O & W/DYE: CPT

## 2021-11-23 PROCEDURE — G0463 HOSPITAL OUTPT CLINIC VISIT: HCPCS | Mod: 25

## 2021-11-23 PROCEDURE — 258N000003 HC RX IP 258 OP 636: Performed by: ANESTHESIOLOGY

## 2021-11-23 PROCEDURE — 72156 MRI NECK SPINE W/O & W/DYE: CPT | Mod: 26 | Performed by: RADIOLOGY

## 2021-11-23 RX ORDER — BISACODYL 10 MG
10 SUPPOSITORY, RECTAL RECTAL DAILY PRN
Status: DISCONTINUED | OUTPATIENT
Start: 2021-11-23 | End: 2021-11-23 | Stop reason: HOSPADM

## 2021-11-23 RX ORDER — ACETAMINOPHEN 325 MG/1
975 TABLET ORAL EVERY 6 HOURS PRN
Status: DISCONTINUED | OUTPATIENT
Start: 2021-11-23 | End: 2021-11-23 | Stop reason: HOSPADM

## 2021-11-23 RX ORDER — MISOPROSTOL 200 UG/1
800 TABLET ORAL
Status: DISCONTINUED | OUTPATIENT
Start: 2021-11-23 | End: 2021-11-23 | Stop reason: HOSPADM

## 2021-11-23 RX ORDER — OXYTOCIN/0.9 % SODIUM CHLORIDE 30/500 ML
340 PLASTIC BAG, INJECTION (ML) INTRAVENOUS CONTINUOUS PRN
Status: DISCONTINUED | OUTPATIENT
Start: 2021-11-23 | End: 2021-11-23 | Stop reason: HOSPADM

## 2021-11-23 RX ORDER — HYDROCORTISONE 2.5 %
CREAM (GRAM) TOPICAL 3 TIMES DAILY PRN
Status: DISCONTINUED | OUTPATIENT
Start: 2021-11-23 | End: 2021-11-23 | Stop reason: HOSPADM

## 2021-11-23 RX ORDER — BUTALBITAL, ACETAMINOPHEN AND CAFFEINE 50; 325; 40 MG/1; MG/1; MG/1
2 TABLET ORAL EVERY 4 HOURS PRN
Status: DISCONTINUED | OUTPATIENT
Start: 2021-11-23 | End: 2021-11-23 | Stop reason: HOSPADM

## 2021-11-23 RX ORDER — SODIUM CHLORIDE, SODIUM LACTATE, POTASSIUM CHLORIDE, CALCIUM CHLORIDE 600; 310; 30; 20 MG/100ML; MG/100ML; MG/100ML; MG/100ML
INJECTION, SOLUTION INTRAVENOUS ONCE
Status: COMPLETED | OUTPATIENT
Start: 2021-11-23 | End: 2021-11-23

## 2021-11-23 RX ORDER — METHYLERGONOVINE MALEATE 0.2 MG/ML
200 INJECTION INTRAVENOUS
Status: DISCONTINUED | OUTPATIENT
Start: 2021-11-23 | End: 2021-11-23 | Stop reason: HOSPADM

## 2021-11-23 RX ORDER — DOCUSATE SODIUM 100 MG/1
100 CAPSULE, LIQUID FILLED ORAL DAILY
Status: DISCONTINUED | OUTPATIENT
Start: 2021-11-23 | End: 2021-11-23 | Stop reason: HOSPADM

## 2021-11-23 RX ORDER — LIDOCAINE 40 MG/G
CREAM TOPICAL
Status: DISCONTINUED | OUTPATIENT
Start: 2021-11-23 | End: 2021-11-23 | Stop reason: HOSPADM

## 2021-11-23 RX ORDER — KETOROLAC TROMETHAMINE 30 MG/ML
15 INJECTION, SOLUTION INTRAMUSCULAR; INTRAVENOUS EVERY 6 HOURS PRN
Status: DISCONTINUED | OUTPATIENT
Start: 2021-11-23 | End: 2021-11-23 | Stop reason: HOSPADM

## 2021-11-23 RX ORDER — CARBOPROST TROMETHAMINE 250 UG/ML
250 INJECTION, SOLUTION INTRAMUSCULAR
Status: DISCONTINUED | OUTPATIENT
Start: 2021-11-23 | End: 2021-11-23 | Stop reason: HOSPADM

## 2021-11-23 RX ORDER — GADOBUTROL 604.72 MG/ML
7.5 INJECTION INTRAVENOUS ONCE
Status: COMPLETED | OUTPATIENT
Start: 2021-11-23 | End: 2021-11-23

## 2021-11-23 RX ORDER — MODIFIED LANOLIN
OINTMENT (GRAM) TOPICAL
Status: DISCONTINUED | OUTPATIENT
Start: 2021-11-23 | End: 2021-11-23 | Stop reason: HOSPADM

## 2021-11-23 RX ORDER — MISOPROSTOL 200 UG/1
400 TABLET ORAL
Status: DISCONTINUED | OUTPATIENT
Start: 2021-11-23 | End: 2021-11-23 | Stop reason: HOSPADM

## 2021-11-23 RX ORDER — TRANEXAMIC ACID 10 MG/ML
1 INJECTION, SOLUTION INTRAVENOUS EVERY 30 MIN PRN
Status: DISCONTINUED | OUTPATIENT
Start: 2021-11-23 | End: 2021-11-23 | Stop reason: HOSPADM

## 2021-11-23 RX ORDER — OXYTOCIN 10 [USP'U]/ML
10 INJECTION, SOLUTION INTRAMUSCULAR; INTRAVENOUS
Status: DISCONTINUED | OUTPATIENT
Start: 2021-11-23 | End: 2021-11-23 | Stop reason: HOSPADM

## 2021-11-23 RX ADMIN — SODIUM CHLORIDE, POTASSIUM CHLORIDE, SODIUM LACTATE AND CALCIUM CHLORIDE: 600; 310; 30; 20 INJECTION, SOLUTION INTRAVENOUS at 13:05

## 2021-11-23 RX ADMIN — GADOBUTROL 7.1 ML: 604.72 INJECTION INTRAVENOUS at 14:34

## 2021-11-23 ASSESSMENT — PAIN SCALES - GENERAL: PAINLEVEL: SEVERE PAIN (6)

## 2021-11-23 ASSESSMENT — ANXIETY QUESTIONNAIRES: GAD7 TOTAL SCORE: 21

## 2021-11-23 NOTE — PROGRESS NOTES
Nursing Notes:   Sabina Celaya RN  2021  9:31 AM  Signed  Chief Complaint   Patient presents with     Postpartum Care     5 days         HPI:  Rach Peterson is a 31 year old  who is 6 days s/p uncomplicated vaginal delivery with post partum course complicated by spinal headache. Reports today that initially she was given a blood patch while still in the hospital which significantly resolved her spinal headache. She was discharged home and her severe headache returned later the same night (). This headache persisted and made it difficult for her to eat, sleep, or hold her infant. Laying down flat was the only time she felt relief of her symptoms. She presented again to the hospital on  due to ongoing symptoms. She was seen by anesthesia and had another blood patch placed which again resolved her symptoms completely. Since this time her symptoms again resolved around 6-7 hours following the blood patch. She feels like her anxiety has been significantly exacerbated due to this headache and lack of sleep. She is anxious that this will never improve. She has been unable to care for her 2 daughters. Has been following anesthesia recommendations of bed rest, hydration, nsaids and caffeine.     Complains of a frontal 10/10, throbbing headache. Exacerbated by bright lights, when she looks downward, and when she sits/stands up. She has also felt nauseated. Has grown increasingly anxious as symptoms have persisted. Denies any vomiting, tinnitus, chest pain, SOB, change in vaginal discharge/bleeding. She is not breastfeeding.       ================================================================  ROS: 10 point ROS neg other than the symptoms noted above in the HPI.     EXAM:  /76   Wt 71.2 kg (157 lb)   LMP 2021 (Exact Date)   BMI 22.53 kg/m      General: alert, moderate distress, cooperative and crying. Laying flat on examination table in dark room.   Psych: positive for sleep  disturbance, anxiety and panic attacks.    Answers for HPI/ROS submitted by the patient on 11/22/2021  MYNOR 7 TOTAL SCORE: 21    ASSESSMENT & PLAN:   Encounter Diagnoses   Name Primary?     Headache after spinal puncture Yes     Anxiety      Orders Placed This Encounter   Procedures     MR Brain w/o & w Contrast     MR Spine Complete w/o & w Contrast     Neurology Referral (Migraine Care Package)      Discussed with neurology and anesthesiology recommendations for management of patients symptoms. Recommendation was for brain imaging to rule out any possible structural causes of headache with symptomatic management while workup is performed. Anesthesiology would be willing to place another blood patch for management of symptoms following imaging. Ultimately if symptoms persist, there may be a need for repair of dural puncture. Neurology recommends follow up with neurosurgery in 1 week. Discussed with patient recommendations at length and plan for pain management while workup is pending. Due to severity of symptoms, will plan for imaging and treatment today. Plan will be to send patient to L&D triage area for ongoing acute management and evaluation of symptoms.     Patient currently working with therapist. Feels worsening anxiety is related to fear that this will not improve and distress over not being able to care for her children. Had significant panic when discussing the resolution might take another week. Would consider sending with small amount of benzos for acute panic symptoms vs starting selective serotonin reuptake inhibitor.     Patient seen and evaluated with Dr. Kwan.     Irene Huang DO, MS  Obstetrics, Gynecology & Women's Health   Resident, PGY-1  11/23/2021 12:52 PM      Appreciate note by Dr. Huang. Patient has been seen and examined by me with the resident, agree with above note.     Josefa Kwan MD

## 2021-11-23 NOTE — LETTER
2021       RE: Rach Peterson  [address]  [place] [address]     Dear Colleague,    Thank you for referring your patient, Rach Peterson, to the [facility] [place] at [facility]. Please see a copy of my visit note below.    Nursing Notes:   Sabina Celaya RN  2021  9:31 AM  Signed  Chief Complaint   Patient presents with     Postpartum Care     5 days         HPI:  Rach Peterson is a 31 year old  who is 6 days s/p uncomplicated vaginal delivery with post partum course complicated by spinal headache. Reports today that initially she was given a blood patch while still in the hospital which significantly resolved her spinal headache. She was discharged home and her severe headache returned later the same night (). This headache persisted and made it difficult for her to eat, sleep, or hold her infant. Laying down flat was the only time she felt relief of her symptoms. She presented again to the hospital on  due to ongoing symptoms. She was seen by anesthesia and had another blood patch placed which again resolved her symptoms completely. Since this time her symptoms again resolved around 6-7 hours following the blood patch. She feels like her anxiety has been significantly exacerbated due to this headache and lack of sleep. She is anxious that this will never improve. She has been unable to care for her 2 daughters. Has been following anesthesia recommendations of bed rest, hydration, nsaids and caffeine.     Complains of a frontal 10/10, throbbing headache. Exacerbated by bright lights, when she looks downward, and when she sits/stands up. She has also felt nauseated. Has grown increasingly anxious as symptoms have persisted. Denies any vomiting, tinnitus, chest pain, SOB, change in vaginal discharge/bleeding. She is not breastfeeding.        ================================================================  ROS: 10 point ROS neg other than the symptoms noted above in the HPI.     EXAM:  /76   Wt 71.2 kg (157 lb)   LMP 02/16/2021 (Exact Date)   BMI 22.53 kg/m      General: alert, moderate distress, cooperative and crying. Laying flat on examination table in dark room.   Psych: positive for sleep disturbance, anxiety and panic attacks.    Answers for HPI/ROS submitted by the patient on 11/22/2021  MYNOR 7 TOTAL SCORE: 21    ASSESSMENT & PLAN:   Encounter Diagnoses   Name Primary?     Headache after spinal puncture Yes     Anxiety      Orders Placed This Encounter   Procedures     MR Brain w/o & w Contrast     MR Spine Complete w/o & w Contrast     Neurology Referral (Migraine Care Package)      Discussed with neurology and anesthesiology recommendations for management of patients symptoms. Recommendation was for brain imaging to rule out any possible structural causes of headache with symptomatic management while workup is performed. Anesthesiology would be willing to place another blood patch for management of symptoms following imaging. Ultimately if symptoms persist, there may be a need for repair of dural puncture. Neurology recommends follow up with neurosurgery in 1 week. Discussed with patient recommendations at length and plan for pain management while workup is pending. Due to severity of symptoms, will plan for imaging and treatment today. Plan will be to send patient to L&D triage area for ongoing acute management and evaluation of symptoms.     Patient currently working with therapist. Feels worsening anxiety is related to fear that this will not improve and distress over not being able to care for her children. Had significant panic when discussing the resolution might take another week. Would consider sending with small amount of benzos for acute panic symptoms vs starting selective serotonin reuptake inhibitor.     Patient seen  and evaluated with Dr. Kwan.     Irene Huang DO, MS  Obstetrics, Gynecology & Women's Health   Resident, PGY-1  11/23/2021 12:52 PM      Appreciate note by Dr. Huang. Patient has been seen and examined by me with the resident, agree with above note.     Josefa Kwan MD

## 2021-11-23 NOTE — TELEPHONE ENCOUNTER
Request received from Dr. Kwan to please assist in expediting neurology referral for severe headache after spinal.    Called and spoke with Neurology staff and requested call back to discuss urgent referral. Direct line call back number given.

## 2021-11-23 NOTE — PROGRESS NOTES
Attempted to call patient on 11/22/21 in the evening to follow up with her regarding headache after EBP on Saturday night. Left VM, no response.

## 2021-11-24 ENCOUNTER — TELEPHONE (OUTPATIENT)
Dept: FAMILY MEDICINE | Facility: CLINIC | Age: 31
End: 2021-11-24

## 2021-11-24 NOTE — PROGRESS NOTES
Anesthesia Post-Partum Progress Note    Patient: Rach Peterson    Patient location: L&D Triage.    Anesthesia type: Epidural 21    Subjective:     Patient is a 31 y.o  s/p  on . Patient had epidural catheter placed for labor analgesia which was complicated by wet tap w/ repeat successful attempt at the same level. She developed PDPH on 21 that was significantly improved with epidural blood patch that same day, but notably did not have complete resolution of symptoms. After discharging home, her symptoms worsened and she returned for repeat blood patch on 21, this time with complete resolution of symptoms. Unfortunately, her symptoms returned approximately 8 hours after discharge home, prompting her visit here today. She continues to endorse 10/10 frontal headache when sitting/standing up that completely resolves when lying flat. The patient has attempted bed rest, hydration, caffeine, tylenol/ibuprofen without improvement. She endorses some associated photophobia and intermittent nausea, but denies any vision changes, dizziness, fevers, chills, vomiting, weakness, paresthesias.    Objective:    General: NAD. Alert and Oriented. Speaking in full sentences.     Respiratory Function (RR / SpO2 / Airway Patency): Satisfactory    Cardiac Function (HR / Rhythm / BP): Satisfactory    Neuro/MSK: Normal gait, strength intact.    Site of epidural insertion: No signs of infection or inflammation.     Last Vitals: /69   Temp 36.6  C (97.9  F) (Oral)   Resp 18   LMP 2021 (Exact Date)      Imaging:  MRI Brain W/O and W Contrast:  Impression:   1.  Constellation of findings consistent with intracranial hypotension  (likely secondary to CSF leak), including diffuse dural thickening,  general dural venous distension, borderline cerebellar tonsillar  ectopia.  2.  No intracranial hemorrhage or acute abnormality.  Enlarged pituitary gland, likely post-partum and/or due  to  intracranial hypotension.     MR Cervical/Thoracic/Lumbar Spine  Impression:    1.  Mild T2 hyperintensity in the dorsal epidural space at L3-L5 and  fluid ventrally at L4-L5 with no associated spinal canal narrowing. In  the setting of MRI brain findings suggestive of intracranial  hypotension, CSF leak in particular is suspected.  2.  No significant spinal canal or neural foraminal narrowing at any  level.  3.  No abnormal contrast enhancement    Assessment and plan:   Rach Peterson is a 31 year old female  post-partum day #6 s/p labor epidural c/b recurrent PDPH requiring epidural blood patch x2. Patient has attempted conservative management at home, including rest, hydration, tylenol/ibuprofen, caffeine, without significant improvement. Her symptoms have caused significant anxiety and impacted her daily living activities. At this point, having failed epidural blood patch x2, we felt it appropriate to pursue further imaging to rule out more insidious causes of headache in the post partum. MRI of the [facility], C/T/L spine as above, results notable for signs of intracranial hypotension and likely CSF leak at the L3-L5 level. No evidence of intracranial bleed.    Discussed extensively with the patient the results of her scans and the possible treatments for what is likely persistent PDPH. Case was discussed with chronic pain provider Dr. Joshi. Dr. Joshi recommended conservative management for up to 2 weeks versus third blood patch, this time with higher volumes (40cc) of blood. Risks and benefits of doing the procedure were extensively discussed with the patient, including the possibility of obtaining no definite relief from this third procedure.  The patient ultimately chose to return home and attempt conservative management. She will return to the LD floor for a blood patch if symptoms don't improve with conservative management including aggressive hydration, rest, non-opioid pain management. Red  flag symptoms including worsening headache, change in the characteristic of the headache, or any change in neurologic symptoms were discussed and the patient was advised to return immediately to the labor floor and/or emergency department. Patient expressed understanding and agreement with the above plan.     Alfredo Sheriff MD   Anesthesiology Resident PGY-3

## 2021-11-24 NOTE — PLAN OF CARE
No changes in symptoms; however, patient prefers to be at home tonight. AVS reviewed with patient. Anesthesia provider recommending trying caffeine pills for headache management. Patient given unit phone number to call for any concerns overnight. Anesthesia aware and to follow closely with patient. Patient discharged ambulatory at 2005 accompanied by her mother.

## 2021-11-24 NOTE — DISCHARGE INSTRUCTIONS
[facility]     [phone]    Continue to drink fluids and stay hydrated, as able.  Caffeine pills, as able.    Return at 11am to Labor and Delivery, Wednesday, November 24th at 11:00am.  Please call with additional concerns overnight.

## 2021-11-24 NOTE — PROGRESS NOTES
Patient requesting to go home now vs stay in the hospital tonight. Pt very anxious and tearful. Reviewing POC with anesthesia.

## 2021-11-24 NOTE — PLAN OF CARE
Patient is here to be evaluated for a spinal headache vs other causes for severe headache post epidural. VSS. Imaging completed but awaiting final reports. Patient declining pain meds at this time but knows they are available if she desires. Plan for anesthesia to discuss plan with patient when they have results. Patient aware.

## 2021-12-21 ENCOUNTER — MEDICAL CORRESPONDENCE (OUTPATIENT)
Dept: HEALTH INFORMATION MANAGEMENT | Facility: CLINIC | Age: 31
End: 2021-12-21
Payer: COMMERCIAL

## 2021-12-28 ASSESSMENT — ANXIETY QUESTIONNAIRES
4. TROUBLE RELAXING: SEVERAL DAYS
2. NOT BEING ABLE TO STOP OR CONTROL WORRYING: NOT AT ALL
6. BECOMING EASILY ANNOYED OR IRRITABLE: SEVERAL DAYS
3. WORRYING TOO MUCH ABOUT DIFFERENT THINGS: NOT AT ALL
5. BEING SO RESTLESS THAT IT IS HARD TO SIT STILL: NOT AT ALL
GAD7 TOTAL SCORE: 2
7. FEELING AFRAID AS IF SOMETHING AWFUL MIGHT HAPPEN: NOT AT ALL
1. FEELING NERVOUS, ANXIOUS, OR ON EDGE: NOT AT ALL
GAD7 TOTAL SCORE: 2
7. FEELING AFRAID AS IF SOMETHING AWFUL MIGHT HAPPEN: NOT AT ALL

## 2021-12-30 ENCOUNTER — OFFICE VISIT (OUTPATIENT)
Dept: OBGYN | Facility: CLINIC | Age: 31
End: 2021-12-30
Attending: ADVANCED PRACTICE MIDWIFE
Payer: COMMERCIAL

## 2021-12-30 VITALS
WEIGHT: 158.2 LBS | BODY MASS INDEX: 22.65 KG/M2 | HEART RATE: 109 BPM | HEIGHT: 70 IN | DIASTOLIC BLOOD PRESSURE: 79 MMHG | SYSTOLIC BLOOD PRESSURE: 110 MMHG

## 2021-12-30 DIAGNOSIS — Z30.017 NEXPLANON INSERTION: ICD-10-CM

## 2021-12-30 DIAGNOSIS — Z01.812 PRE-PROCEDURE LAB EXAM: ICD-10-CM

## 2021-12-30 DIAGNOSIS — F41.9 ANXIETY: ICD-10-CM

## 2021-12-30 DIAGNOSIS — Z30.017 INSERTION OF IMPLANTABLE SUBDERMAL CONTRACEPTIVE: ICD-10-CM

## 2021-12-30 PROBLEM — O09.90 HIGH-RISK PREGNANCY, UNSPECIFIED TRIMESTER: Status: RESOLVED | Noted: 2021-04-29 | Resolved: 2021-12-30

## 2021-12-30 PROBLEM — Z97.5 NEXPLANON IN PLACE: Status: ACTIVE | Noted: 2021-12-30

## 2021-12-30 LAB
HCG UR QL: NEGATIVE
INTERNAL QC OK POCT: NORMAL
POCT KIT EXPIRATION DATE: NORMAL
POCT KIT LOT NUMBER: NORMAL

## 2021-12-30 PROCEDURE — 99207 PR POST PARTUM EXAM: CPT | Performed by: ADVANCED PRACTICE MIDWIFE

## 2021-12-30 PROCEDURE — G0463 HOSPITAL OUTPT CLINIC VISIT: HCPCS

## 2021-12-30 PROCEDURE — 11981 INSERTION DRUG DLVR IMPLANT: CPT | Performed by: ADVANCED PRACTICE MIDWIFE

## 2021-12-30 PROCEDURE — 81025 URINE PREGNANCY TEST: CPT | Performed by: ADVANCED PRACTICE MIDWIFE

## 2021-12-30 PROCEDURE — 250N000011 HC RX IP 250 OP 636: Performed by: ADVANCED PRACTICE MIDWIFE

## 2021-12-30 RX ORDER — ETONOGESTREL 68 MG/1
1 IMPLANT SUBCUTANEOUS ONCE
Qty: 1 EACH | Refills: 0
Start: 2021-12-30 | End: 2023-04-03

## 2021-12-30 RX ADMIN — ETONOGESTREL 68 MG: 68 IMPLANT SUBCUTANEOUS at 11:00

## 2021-12-30 ASSESSMENT — PATIENT HEALTH QUESTIONNAIRE - PHQ9: 5. POOR APPETITE OR OVEREATING: NOT AT ALL

## 2021-12-30 ASSESSMENT — ANXIETY QUESTIONNAIRES
3. WORRYING TOO MUCH ABOUT DIFFERENT THINGS: SEVERAL DAYS
7. FEELING AFRAID AS IF SOMETHING AWFUL MIGHT HAPPEN: NOT AT ALL
1. FEELING NERVOUS, ANXIOUS, OR ON EDGE: SEVERAL DAYS
5. BEING SO RESTLESS THAT IT IS HARD TO SIT STILL: NOT AT ALL
6. BECOMING EASILY ANNOYED OR IRRITABLE: MORE THAN HALF THE DAYS
GAD7 TOTAL SCORE: 5
2. NOT BEING ABLE TO STOP OR CONTROL WORRYING: SEVERAL DAYS

## 2021-12-30 ASSESSMENT — MIFFLIN-ST. JEOR: SCORE: 1517.84

## 2021-12-30 NOTE — PROGRESS NOTES
"Answers for HPI/ROS submitted by the patient on 2021  MYNOR 7 TOTAL SCORE: 2    Nursing Notes:   Dilia Ho, [facility]  2021  9:24 AM  Signed  SUBJECTIVE:   Rach Peterson is here for her 6-week postpartum checkup.     PHQ-9 score: 2  Hx of Abuse:  No    Delivery Date: 2021.    Delivering provider:  Delia Shaw MD.    Type of delivery:  .  Perineum:  in tact.     Delivery complications: None  Infant gender:  girl, weight 8 pounds 3.2 oz.  Feeding Method:  Bottlefed.  Complications reported with feeding:  none, infant thriving .    Bleeding:  None.  Duration:  4weeks.  Menses resumed:  No  Bowel/Urinary problems:  No    Contraception Planned:  Nexplanon  She  has not had intercourse since delivery..         ================================================================  Rach is here for a routine postpartum follow-up. She had been suffering from severe headaches related to her spinal epidural during labor and was treated x2 with spinal blood patches in the initial postpartum period and tried conservative therapies at home. She was also seen by neurology and anesthesia multiple times to address these headaches, since her last blood patch she has felt that this issue has resolved and is no longer having headaches. She was also experiencing panic attacks and severe anxiety from her headaches and now feels that has resolved too.     Bleeding has resolved, no cramping. Not yet resumed intercourse. Desires nexplanon for contraception. Previous used birth control pills and not interested in an IUD.     ROS: 10 point ROS neg other than the symptoms noted above in the HPI.   CONSTITUTIONAL: negative  : negative  PSYCHIATRIC: negative, anxiety and depression    EXAM:  /79 (BP Location: Left arm, Patient Position: Chair)   Pulse 109   Ht 1.778 m (5' 10\")   Wt 71.8 kg (158 lb 3.2 oz)   LMP 2021 (Exact Date)   BMI 22.70 kg/m      General: healthy, alert and no " distress  Psych: negative for anxiety and depression  Last PHQ-9 score on record= No Value exists for the : HP#PHQ9  Breasts:  Deferred per pt prefence, formula feeding   Abdomen: Benign, Soft, flat, non-tender, No masses, organomegaly and Diastasis less than 1-2 FB  Vulva:  Normal genitalia and Bartholin's, Urethra, [place]'s normal  Vagina:  normal with good muscle tone, rugated and epis/repair well healed  Cervix:  pink, moist, closed, without or CMT.    Uterus:  fully involuted and non-tender    Adnexa:  Within normal limits and No masses, nodularity, tenderness  Recto-vaginal:   anus normal    Procedure note:    Patient presents for placement of Nexplanon for contraception.  She has had been counseled on side effects, risks, benefits and alternatives.  Patient desires to proceed.    Verification of Procedure:  Just before the procedure begans through verbal and active participation of team members, I verified:     initial Initials   Patient Name HOLLIS BERRIOS   Patient  [facility] 1990   Procedure to be performed AFP Nexplanon insertion     Consent:  Risks, benefits of treatment, and alternative options for contraception were discussed.  Patient's questions were elicited and answered.  Written consent was obtained and scanned into medical record.     Patient was resting comfortably on exam table, left arm placed at shoulder level in a 90 degree angle.  Skin was marked 8cm from epicondyl and cleansed with betadine solution.  Insertion site and track infiltrated with 2cc 1% Lidocaine.      Nexplanon device visualized in applicator by patient and provider.  Skin punctured with applicator at insertion site and advanced with ease in the intradermal space.  Applicator was removed.  Nexplanon was palpated by provider and patient.    Small amount of bleeding noted at insertion site and no bruising noted along track of Nexplanon.  Bandage and pressure dressing applied to insertion site.       Patient tolerated procedure  well.  Lot number [id number].  To be removed/replaced by 2024.    Written and verbal instructions provided to patient.    ASSESSMENT:   Encounter Diagnoses   Name Primary?     Pre-procedure lab exam      Anxiety      Insertion of implantable subdermal contraceptive      Routine postpartum follow-up Yes     Nexplanon insertion       Normal postpartum exam after   Pregnancy was complicated by:  Spinal headaches following epidural.      PLAN:  Orders Placed This Encounter   Procedures     Insert Nexplanon [77449]     hCG qual urine POCT      Orders Placed This Encounter   Medications     etonogestrel (NEXPLANON) 68 MG IMPL     Si each (68 mg) by Subdermal route once for 1 dose     Dispense:  1 each     Refill:  0        Risks and benefits of prescribed medications discussed.  Medication instructions reviewed.  Signs and symptoms of postpartum depression/anxiety discussed and resources offered  Exercise encouraged  Follow up in 1 year  Russ exercises recommended    GUANAKO Ocampo CNM

## 2021-12-30 NOTE — NURSING NOTE
SUBJECTIVE:   Rach Peterson is here for her 6-week postpartum checkup.     PHQ-9 score: 2  Hx of Abuse:  No    Delivery Date: 2021.    Delivering provider:  Delia Shaw MD.    Type of delivery:  .  Perineum:  in tact.     Delivery complications: None  Infant gender:  girl, weight 8 pounds 3.2 oz.  Feeding Method:  Bottlefed.  Complications reported with feeding:  none, infant thriving .    Bleeding:  None.  Duration:  4weeks.  Menses resumed:  No  Bowel/Urinary problems:  No    Contraception Planned:  Nexplanon  She  has not had intercourse since delivery..

## 2021-12-30 NOTE — LETTER
2021       RE: Rach Peterson  [address]  [place] [address]     Dear Colleague,    Thank you for referring your patient, Rach Peterson, to the [facility] [place] at [facility]. Please see a copy of my visit note below.    Answers for HPI/ROS submitted by the patient on 2021  MYNOR 7 TOTAL SCORE: 2    Nursing Notes:   Dilia Ho CMA  2021  9:24 AM  Signed  SUBJECTIVE:   Rach Peterson is here for her 6-week postpartum checkup.     PHQ-9 score: 2  Hx of Abuse:  No    Delivery Date: 2021.    Delivering provider:  Delia Shaw MD.    Type of delivery:  .  Perineum:  in tact.     Delivery complications: None  Infant gender:  girl, weight 8 pounds 3.2 oz.  Feeding Method:  Bottlefed.  Complications reported with feeding:  none, infant thriving .    Bleeding:  None.  Duration:  4weeks.  Menses resumed:  No  Bowel/Urinary problems:  No    Contraception Planned:  Nexplanon  She  has not had intercourse since delivery..         ================================================================  Rach is here for a routine postpartum follow-up. She had been suffering from severe headaches related to her spinal epidural during labor and was treated x2 with spinal blood patches in the initial postpartum period and tried conservative therapies at home. She was also seen by neurology and anesthesia multiple times to address these headaches, since her last blood patch she has felt that this issue has resolved and is no longer having headaches. She was also experiencing panic attacks and severe anxiety from her headaches and now feels that has resolved too.     Bleeding has resolved, no cramping. Not yet resumed intercourse. Desires nexplanon for contraception. Previous used birth control pills and not interested in an IUD.     ROS: 10 point ROS neg other than the symptoms noted above in the  "HPI.   CONSTITUTIONAL: negative  : negative  PSYCHIATRIC: negative, anxiety and depression    EXAM:  /79 (BP Location: Left arm, Patient Position: Chair)   Pulse 109   Ht 1.778 m (5' 10\")   Wt 71.8 kg (158 lb 3.2 oz)   LMP 2021 (Exact Date)   BMI 22.70 kg/m      General: healthy, alert and no distress  Psych: negative for anxiety and depression  Last PHQ-9 score on record= No Value exists for the : HP#PHQ9  Breasts:  Deferred per pt prefence, formula feeding   Abdomen: Benign, Soft, flat, non-tender, No masses, organomegaly and Diastasis less than 1-2 FB  Vulva:  Normal genitalia and Bartholin's, Urethra, [place]'s normal  Vagina:  normal with good muscle tone, rugated and epis/repair well healed  Cervix:  pink, moist, closed, without or CMT.    Uterus:  fully involuted and non-tender    Adnexa:  Within normal limits and No masses, nodularity, tenderness  Recto-vaginal:   anus normal    Procedure note:    Patient presents for placement of Nexplanon for contraception.  She has had been counseled on side effects, risks, benefits and alternatives.  Patient desires to proceed.    Verification of Procedure:  Just before the procedure begans through verbal and active participation of team members, I verified:     initial Initials   Patient Name HOLLIS BERRIOS   Patient  [facility] 1990   Procedure to be performed AFP Nexplanon insertion     Consent:  Risks, benefits of treatment, and alternative options for contraception were discussed.  Patient's questions were elicited and answered.  Written consent was obtained and scanned into medical record.     Patient was resting comfortably on exam table, left arm placed at shoulder level in a 90 degree angle.  Skin was marked 8cm from epicondyl and cleansed with betadine solution.  Insertion site and track infiltrated with 2cc 1% Lidocaine.      Nexplanon device visualized in applicator by patient and provider.  Skin punctured with applicator at insertion site and " advanced with ease in the intradermal space.  Applicator was removed.  Nexplanon was palpated by provider and patient.    Small amount of bleeding noted at insertion site and no bruising noted along track of Nexplanon.  Bandage and pressure dressing applied to insertion site.       Patient tolerated procedure well.  Lot number [id number].  To be removed/replaced by 2024.    Written and verbal instructions provided to patient.    ASSESSMENT:   Encounter Diagnoses   Name Primary?     Pre-procedure lab exam      Anxiety      Insertion of implantable subdermal contraceptive      Routine postpartum follow-up Yes     Nexplanon insertion       Normal postpartum exam after   Pregnancy was complicated by:  Spinal headaches following epidural.      PLAN:  Orders Placed This Encounter   Procedures     Insert Nexplanon [53270]     hCG qual urine POCT      Orders Placed This Encounter   Medications     etonogestrel (NEXPLANON) 68 MG IMPL     Si each (68 mg) by Subdermal route once for 1 dose     Dispense:  1 each     Refill:  0        Risks and benefits of prescribed medications discussed.  Medication instructions reviewed.  Signs and symptoms of postpartum depression/anxiety discussed and resources offered  Exercise encouraged  Follow up in 1 year  [place] exercises recommended          Again, thank you for allowing me to participate in the care of your patient.      Sincerely,    GUANAKO Ocampo CNM

## 2022-01-02 PROBLEM — Z30.017 NEXPLANON INSERTION: Status: ACTIVE | Noted: 2021-11-17

## 2022-01-25 ENCOUNTER — MEDICAL CORRESPONDENCE (OUTPATIENT)
Dept: HEALTH INFORMATION MANAGEMENT | Facility: CLINIC | Age: 32
End: 2022-01-25
Payer: COMMERCIAL

## 2022-02-17 PROBLEM — O09.299 HISTORY OF FETAL ANOMALY IN PRIOR PREGNANCY, CURRENTLY PREGNANT: Status: RESOLVED | Noted: 2020-07-10 | Resolved: 2021-02-24

## 2022-02-17 PROBLEM — O99.820 GBS (GROUP B STREPTOCOCCUS CARRIER), +RV CULTURE, CURRENTLY PREGNANT: Status: RESOLVED | Noted: 2020-06-15 | Resolved: 2021-02-24

## 2022-02-17 PROBLEM — Z34.80 SUPERVISION OF OTHER NORMAL PREGNANCY: Status: RESOLVED | Noted: 2019-12-10 | Resolved: 2021-02-24

## 2022-04-09 ENCOUNTER — HEALTH MAINTENANCE LETTER (OUTPATIENT)
Age: 32
End: 2022-04-09

## 2022-05-12 ENCOUNTER — OFFICE VISIT (OUTPATIENT)
Dept: URGENT CARE | Facility: URGENT CARE | Age: 32
End: 2022-05-12
Payer: COMMERCIAL

## 2022-05-12 VITALS
TEMPERATURE: 97.5 F | DIASTOLIC BLOOD PRESSURE: 80 MMHG | SYSTOLIC BLOOD PRESSURE: 117 MMHG | WEIGHT: 150 LBS | BODY MASS INDEX: 21.47 KG/M2 | RESPIRATION RATE: 18 BRPM | HEIGHT: 70 IN | OXYGEN SATURATION: 97 % | HEART RATE: 95 BPM

## 2022-05-12 DIAGNOSIS — H65.91 OME (OTITIS MEDIA WITH EFFUSION), RIGHT: Primary | ICD-10-CM

## 2022-05-12 PROCEDURE — 99213 OFFICE O/P EST LOW 20 MIN: CPT | Performed by: NURSE PRACTITIONER

## 2022-05-12 RX ORDER — AMOXICILLIN 875 MG
875 TABLET ORAL 2 TIMES DAILY
Qty: 14 TABLET | Refills: 0 | Status: SHIPPED | OUTPATIENT
Start: 2022-05-12 | End: 2022-05-19

## 2022-05-15 NOTE — PROGRESS NOTES
"Rach Peterson is a 31 year old female who is being evaluated via a billable telephone visit.      The patient has been notified of following:   \"This telephone visit will be conducted via a call between you and your physician/provider. We have found that certain health care needs can be provided without the need for a physical exam.  This service lets us provide the care you need with a short phone conversation.  If a prescription is necessary we can send it directly to your pharmacy.  If lab work is needed we can place an order for that and you can then stop by our lab to have the test done at a later time. If during the course of the call the physician/provider feels a telephone visit is not appropriate, you will not be charged for this service.\"     Physician has received verbal consent for a Telephone Visit from the patient? Yes    BP Readings from Last 3 Encounters:   22 117/80   21 110/79   21 126/69    Wt Readings from Last 3 Encounters:   22 68 kg (150 lb)   21 71.8 kg (158 lb 3.2 oz)   21 71.2 kg (157 lb)         History reviewed and updated as needed this visit by provider    Subjective:      31 year old  presents for telephone appointment to discuss contraception options.     Nexplanon placed 2021 at 6 week postpartum visit.  21. Rach did not have any vaginal bleeding for 40 days after placement. She then got her period in January and the bleeding has not stopped since. She has not tried high dose NSAIDs to help with her breakthrough bleeding.     Rach reports taking a birth control pill in the past, but is unsure of the name. She reports having a history of preeclampsia, but denies ongoing hypertension. She denies a history of migraines with aura, blood clots, or any plans for surgery in the near future. She does not have a history of liver or kidney disease.     Rach requests a refill for daily valacyclovir for HSV suppression. " Last outbreak reported as 2012.    Objective:  Mood: Alert, normal speech pattern, asking appropriate questions   No acute distress.     Assessment/Plan     Encounter Diagnoses   Name Primary?     Breakthrough bleeding on Nexplanon Yes     HSV (herpes simplex virus) infection      - Discussed the option for ANNE-MARIE vs. high dose NSAID and patient opted to try the high dose NSAID  - Rx sent for 800 mg ibuprofen every 8 hours for 7 days duration  - Recommended patient to take ibuprofen with food and to report any symptoms of GI upset  - Refill Rx sent for valacyclovir. Discussed that it would be a good time to trial discontinuation of suppressive therapy. Reviewed HSV symptoms typically are worse in first year of virus and then improve over time. Discussed that suppressive therapy may not be indicated any longer. Pt verbalizes understanding.     Return for visit as needed. Call prn if questions or concerns.     Phone call duration:  11 minutes    I, Andreia Cruz, completed the PFSH and ROS. I then acted as a scribe for GUANAKO Lema CNM for the remainder of the visit.  Andreia Cruz BSN, RN, PHN, [facility] DNP, WHNP student    The encounter was performed by me and scribed by the SNM. The scribed note accurately reflects my personal services and decisions made by me.     GUANAKO Lema CNM

## 2022-05-16 ENCOUNTER — VIRTUAL VISIT (OUTPATIENT)
Dept: OBGYN | Facility: CLINIC | Age: 32
End: 2022-05-16
Attending: MIDWIFE
Payer: COMMERCIAL

## 2022-05-16 DIAGNOSIS — B00.9 HERPES SIMPLEX TYPE 2 INFECTION: ICD-10-CM

## 2022-05-16 DIAGNOSIS — Z97.5 BREAKTHROUGH BLEEDING ON NEXPLANON: Primary | ICD-10-CM

## 2022-05-16 DIAGNOSIS — N92.1 BREAKTHROUGH BLEEDING ON NEXPLANON: Primary | ICD-10-CM

## 2022-05-16 DIAGNOSIS — B00.9 HSV (HERPES SIMPLEX VIRUS) INFECTION: ICD-10-CM

## 2022-05-16 PROBLEM — Z30.017 INSERTION OF IMPLANTABLE SUBDERMAL CONTRACEPTIVE: Status: RESOLVED | Noted: 2021-11-17 | Resolved: 2022-05-16

## 2022-05-16 PROBLEM — R82.71 GBS BACTERIURIA: Status: RESOLVED | Noted: 2021-05-01 | Resolved: 2022-05-16

## 2022-05-16 PROCEDURE — 99212 OFFICE O/P EST SF 10 MIN: CPT | Mod: 95 | Performed by: MIDWIFE

## 2022-05-16 RX ORDER — VALACYCLOVIR HYDROCHLORIDE 500 MG/1
500 TABLET, FILM COATED ORAL DAILY
Qty: 90 TABLET | Refills: 3 | Status: SHIPPED | OUTPATIENT
Start: 2022-05-16 | End: 2022-06-27

## 2022-05-16 RX ORDER — IBUPROFEN 800 MG/1
800 TABLET, FILM COATED ORAL EVERY 8 HOURS PRN
Qty: 21 TABLET | Refills: 0 | Status: SHIPPED | OUTPATIENT
Start: 2022-05-16 | End: 2023-04-03

## 2022-05-16 NOTE — LETTER
"2022       RE: Rach Peterson   [address] [place] [address]     Dear Colleague,    Thank you for referring your patient, Rach Peterson, to the [facility] [place] [facility]. Please see a copy of my visit note below.    Rach Peterson is a 31 year old female who is being evaluated via a billable telephone visit.      The patient has been notified of following:   \"This telephone visit will be conducted via a call between you and your physician/provider. We have found that certain health care needs can be provided without the need for a physical exam.  This service lets us provide the care you need with a short phone conversation.  If a prescription is necessary we can send it directly to your pharmacy.  If lab work is needed we can place an order for that and you can then stop by our lab to have the test done at a later time. If during the course of the call the physician/provider feels a telephone visit is not appropriate, you will not be charged for this service.\"     Physician has received verbal consent for a Telephone Visit from the patient? Yes    BP Readings from Last 3 Encounters:   22 117/80   21 110/79   21 126/69    Wt Readings from Last 3 Encounters:   22 68 kg (150 lb)   21 71.8 kg (158 lb 3.2 oz)   21 71.2 kg (157 lb)         History reviewed and updated as needed this visit by provider    Subjective:      31 year old  presents for telephone appointment to discuss contraception options.     Nexplanon placed 2021 at 6 week postpartum visit.  21. Rach did not have any vaginal bleeding for 40 days after placement. She then got her period in January and the bleeding has not stopped since. She has not tried high dose NSAIDs to help with her breakthrough bleeding.     Rach reports taking a birth control pill in the past, but is " unsure of the name. She reports having a history of preeclampsia, but denies ongoing hypertension. She denies a history of migraines with aura, blood clots, or any plans for surgery in the near future. She does not have a history of liver or kidney disease.     Rach requests a refill for daily valacyclovir for HSV suppression. Last outbreak reported as 2012.    Objective:  Mood: Alert, normal speech pattern, asking appropriate questions   No acute distress.     Assessment/Plan     Encounter Diagnoses   Name Primary?     Breakthrough bleeding on Nexplanon Yes     HSV (herpes simplex virus) infection      - Discussed the option for ANNE-MARIE vs. high dose NSAID and patient opted to try the high dose NSAID  - Rx sent for 800 mg ibuprofen every 8 hours for 7 days duration  - Recommended patient to take ibuprofen with food and to report any symptoms of GI upset  - Refill Rx sent for valacyclovir. Discussed that it would be a good time to trial discontinuation of suppressive therapy. Reviewed HSV symptoms typically are worse in first year of virus and then improve over time. Discussed that suppressive therapy may not be indicated any longer. Pt verbalizes understanding.     Return for visit as needed. Call prn if questions or concerns.     Phone call duration:  11 minutes    I, Andreia Cruz, completed the PFSH and ROS. I then acted as a scribe for GUANAKO Lema CNM for the remainder of the visit.  Andreia Cruz BSN, RN, PHN, [facility] DNP, [employer] student    The encounter was performed by me and scribed by the SNM. The scribed note accurately reflects my personal services and decisions made by me.     GUANAKO Lema CNM

## 2022-05-18 RX ORDER — VALACYCLOVIR HYDROCHLORIDE 500 MG/1
TABLET, FILM COATED ORAL
Qty: 90 TABLET | Refills: 3 | OUTPATIENT
Start: 2022-05-18

## 2022-05-25 ENCOUNTER — MEDICAL CORRESPONDENCE (OUTPATIENT)
Dept: HEALTH INFORMATION MANAGEMENT | Facility: CLINIC | Age: 32
End: 2022-05-25
Payer: COMMERCIAL

## 2022-06-23 ASSESSMENT — ENCOUNTER SYMPTOMS
HEMATOCHEZIA: 0
PALPITATIONS: 0
MYALGIAS: 0
HEARTBURN: 0
FEVER: 0
NERVOUS/ANXIOUS: 0
DIARRHEA: 0
JOINT SWELLING: 0
CHILLS: 0
NAUSEA: 0
HEMATURIA: 0
DIZZINESS: 0
BREAST MASS: 0
SORE THROAT: 0
DYSURIA: 0
HEADACHES: 0
ABDOMINAL PAIN: 0
ARTHRALGIAS: 0
WEAKNESS: 0
EYE PAIN: 0
SHORTNESS OF BREATH: 0
COUGH: 0
PARESTHESIAS: 0
FREQUENCY: 0
CONSTIPATION: 0

## 2022-06-27 ENCOUNTER — OFFICE VISIT (OUTPATIENT)
Dept: FAMILY MEDICINE | Facility: CLINIC | Age: 32
End: 2022-06-27
Payer: COMMERCIAL

## 2022-06-27 VITALS
SYSTOLIC BLOOD PRESSURE: 110 MMHG | DIASTOLIC BLOOD PRESSURE: 70 MMHG | BODY MASS INDEX: 20.62 KG/M2 | WEIGHT: 144 LBS | OXYGEN SATURATION: 100 % | HEIGHT: 70 IN | RESPIRATION RATE: 12 BRPM | TEMPERATURE: 98.5 F | HEART RATE: 86 BPM

## 2022-06-27 DIAGNOSIS — B00.9 HSV (HERPES SIMPLEX VIRUS) INFECTION: ICD-10-CM

## 2022-06-27 DIAGNOSIS — Z00.00 ROUTINE GENERAL MEDICAL EXAMINATION AT A HEALTH CARE FACILITY: Primary | ICD-10-CM

## 2022-06-27 PROCEDURE — 99395 PREV VISIT EST AGE 18-39: CPT | Performed by: NURSE PRACTITIONER

## 2022-06-27 RX ORDER — VALACYCLOVIR HYDROCHLORIDE 500 MG/1
500 TABLET, FILM COATED ORAL DAILY
Qty: 90 TABLET | Refills: 3 | Status: SHIPPED | OUTPATIENT
Start: 2022-06-27 | End: 2023-07-11

## 2022-06-27 ASSESSMENT — ENCOUNTER SYMPTOMS
SHORTNESS OF BREATH: 0
PARESTHESIAS: 0
ARTHRALGIAS: 0
HEMATURIA: 0
CONSTIPATION: 0
CHILLS: 0
FEVER: 0
JOINT SWELLING: 0
WEAKNESS: 0
HEARTBURN: 0
PALPITATIONS: 0
HEADACHES: 0
DIZZINESS: 0
ABDOMINAL PAIN: 0
HEMATOCHEZIA: 0
NAUSEA: 0
DIARRHEA: 0
COUGH: 0
DYSURIA: 0
SORE THROAT: 0
NERVOUS/ANXIOUS: 0
MYALGIAS: 0
BREAST MASS: 0
FREQUENCY: 0
EYE PAIN: 0

## 2022-06-27 ASSESSMENT — ANXIETY QUESTIONNAIRES
1. FEELING NERVOUS, ANXIOUS, OR ON EDGE: NOT AT ALL
2. NOT BEING ABLE TO STOP OR CONTROL WORRYING: SEVERAL DAYS
6. BECOMING EASILY ANNOYED OR IRRITABLE: SEVERAL DAYS
3. WORRYING TOO MUCH ABOUT DIFFERENT THINGS: SEVERAL DAYS
5. BEING SO RESTLESS THAT IT IS HARD TO SIT STILL: NOT AT ALL
IF YOU CHECKED OFF ANY PROBLEMS ON THIS QUESTIONNAIRE, HOW DIFFICULT HAVE THESE PROBLEMS MADE IT FOR YOU TO DO YOUR WORK, TAKE CARE OF THINGS AT HOME, OR GET ALONG WITH OTHER PEOPLE: NOT DIFFICULT AT ALL
7. FEELING AFRAID AS IF SOMETHING AWFUL MIGHT HAPPEN: NOT AT ALL
GAD7 TOTAL SCORE: 3
GAD7 TOTAL SCORE: 3

## 2022-06-27 ASSESSMENT — PATIENT HEALTH QUESTIONNAIRE - PHQ9
5. POOR APPETITE OR OVEREATING: NOT AT ALL
SUM OF ALL RESPONSES TO PHQ QUESTIONS 1-9: 0

## 2022-06-27 NOTE — PROGRESS NOTES
SUBJECTIVE:   CC: Rach Peterson is an 31 year old woman who presents for preventive health visit.       Patient has been advised of split billing requirements and indicates understanding: Yes  Healthy Habits:     Getting at least 3 servings of Calcium per day:  Yes    Bi-annual eye exam:  Yes    Dental care twice a year:  Yes    Sleep apnea or symptoms of sleep apnea:  None    Diet:  Regular (no restrictions)    Frequency of exercise:  4-5 days/week    Duration of exercise:  30-45 minutes    Taking medications regularly:  Yes    Medication side effects:  None    PHQ-2 Total Score: 0    Additional concerns today:  No    Today's PHQ-2 Score:   PHQ-2 ( 1999 Pfizer) 6/23/2022   Q1: Little interest or pleasure in doing things 0   Q2: Feeling down, depressed or hopeless 0   PHQ-2 Score 0   PHQ-2 Total Score (12-17 Years)- Positive if 3 or more points; Administer PHQ-A if positive -   Q1: Little interest or pleasure in doing things Not at all   Q2: Feeling down, depressed or hopeless Not at all   PHQ-2 Score 0       Abuse: Current or Past (Physical, Sexual or Emotional) - No  Do you feel safe in your environment? Yes        Social History     Tobacco Use     Smoking status: Never Smoker     Smokeless tobacco: Never Used     Tobacco comment: Non smoking home   Substance Use Topics     Alcohol use: Not Currently     Comment: four drinks once every two weeks.       Alcohol Use 6/23/2022   Prescreen: >3 drinks/day or >7 drinks/week? Not Applicable   Prescreen: >3 drinks/day or >7 drinks/week? -       Reviewed orders with patient.  Reviewed health maintenance and updated orders accordingly - Yes      Breast Cancer Screening:    Breast CA Risk Assessment (FHS-7) 6/23/2022   Do you have a family history of breast, colon, or ovarian cancer? No / Unknown         History of abnormal Pap smear: NO - age 30- 65 PAP every 3 years recommended  PAP / HPV 8/25/2020 5/5/2017 5/23/2014   PAP (Historical) NIL NIL NIL     Reviewed  "and updated as needed this visit by clinical staff   Tobacco  Allergies  Meds  Problems  Med Hx  Surg Hx  Fam Hx  Soc   Hx          Reviewed and updated as needed this visit by Provider      Review of Systems   Constitutional: Negative for chills and fever.   HENT: Negative for congestion, ear pain, hearing loss and sore throat.    Eyes: Negative for pain and visual disturbance.   Respiratory: Negative for cough and shortness of breath.    Cardiovascular: Negative for chest pain, palpitations and peripheral edema.   Gastrointestinal: Negative for abdominal pain, constipation, diarrhea, heartburn, hematochezia and nausea.   Breasts:  Negative for tenderness, breast mass and discharge.   Genitourinary: Negative for dysuria, frequency, genital sores, hematuria, pelvic pain, urgency, vaginal bleeding and vaginal discharge.   Musculoskeletal: Negative for arthralgias, joint swelling and myalgias.   Skin: Negative for rash.   Neurological: Negative for dizziness, weakness, headaches and paresthesias.   Psychiatric/Behavioral: Negative for mood changes. The patient is not nervous/anxious.           OBJECTIVE:   /70 (BP Location: Right arm, Patient Position: Chair, Cuff Size: Adult Regular)   Pulse 86   Temp 98.5  F (36.9  C) (Oral)   Resp 12   Ht 1.778 m (5' 10\")   Wt 65.3 kg (144 lb)   SpO2 100%   BMI 20.66 kg/m    Physical Exam  Constitutional:       General: She is not in acute distress.     Appearance: She is well-developed.   Eyes:      Conjunctiva/sclera: Conjunctivae normal.   Cardiovascular:      Rate and Rhythm: Normal rate and regular rhythm.      Heart sounds: Normal heart sounds.   Pulmonary:      Effort: Pulmonary effort is normal.      Breath sounds: Normal breath sounds.   Abdominal:      General: Bowel sounds are normal.      Palpations: Abdomen is soft. There is no mass.   Musculoskeletal:      Cervical back: Neck supple.   Lymphadenopathy:      Cervical: No cervical adenopathy. " "  Skin:     General: Skin is warm and dry.      Findings: No rash.         ASSESSMENT/PLAN:       ICD-10-CM    1. Routine general medical examination at a health care facility  Z00.00    2. HSV (herpes simplex virus) infection  B00.9 valACYclovir (VALTREX) 500 MG tablet     No concerns today.  Not due pap until 99270.  Is considering stopping routine antviral therapy, but for now would like refill on medication.  No outbreaks this previous 12 months.        COUNSELING:  Reviewed preventive health counseling, as reflected in patient instructions       Regular exercise       Healthy diet/nutrition    Estimated body mass index is 20.66 kg/m  as calculated from the following:    Height as of this encounter: 1.778 m (5' 10\").    Weight as of this encounter: 65.3 kg (144 lb).        She reports that she has never smoked. She has never used smokeless tobacco.      Counseling Resources:  ATP IV Guidelines  Pooled Cohorts Equation Calculator  Breast Cancer Risk Calculator  BRCA-Related Cancer Risk Assessment: FHS-7 Tool  FRAX Risk Assessment  ICSI Preventive Guidelines  Dietary Guidelines for Americans, 2010  USDA's MyPlate  ASA Prophylaxis  Lung CA Screening    GUANAKO Damian CNP  [facility]  "

## 2022-06-27 NOTE — PATIENT INSTRUCTIONS
Keep your weight stable, no weight loss recommended.  Let us know if you have any concerns with the antiviral medication            Preventive Health Recommendations  Female Ages 26 - 39  Yearly exam:   See your health care provider every year in order to  Review health changes.   Discuss preventive care.    Review your medicines if you your doctor has prescribed any.    Until age 30: Get a Pap test every three years (more often if you have had an abnormal result).    After age 30: Talk to your doctor about whether you should have a Pap test every 3 years or have a Pap test with HPV screening every 5 years.   You do not need a Pap test if your uterus was removed (hysterectomy) and you have not had cancer.  You should be tested each year for STDs (sexually transmitted diseases), if you're at risk.   Talk to your provider about how often to have your cholesterol checked.  If you are at risk for diabetes, you should have a diabetes test (fasting glucose).  Shots: Get a flu shot each year. Get a tetanus shot every 10 years.   Nutrition:   Eat at least 5 servings of fruits and vegetables each day.  Eat whole-grain bread, whole-wheat pasta and brown rice instead of white grains and rice.  Get adequate Calcium and Vitamin D.     Lifestyle  Exercise at least 150 minutes a week (30 minutes a day, 5 days of the week). This will help you control your weight and prevent disease.  Limit alcohol to one drink per day.  No smoking.   Wear sunscreen to prevent skin cancer.  See your dentist every six months for an exam and cleaning.

## 2022-10-10 ENCOUNTER — HEALTH MAINTENANCE LETTER (OUTPATIENT)
Age: 32
End: 2022-10-10

## 2023-04-03 ENCOUNTER — VIRTUAL VISIT (OUTPATIENT)
Dept: FAMILY MEDICINE | Facility: CLINIC | Age: 33
End: 2023-04-03
Payer: COMMERCIAL

## 2023-04-03 DIAGNOSIS — J01.90 ACUTE SINUSITIS WITH SYMPTOMS > 10 DAYS: Primary | ICD-10-CM

## 2023-04-03 PROCEDURE — 99213 OFFICE O/P EST LOW 20 MIN: CPT | Mod: VID | Performed by: NURSE PRACTITIONER

## 2023-04-03 RX ORDER — SPIRONOLACTONE 100 MG/1
TABLET, FILM COATED ORAL
COMMUNITY
Start: 2023-03-13

## 2023-04-03 ASSESSMENT — ANXIETY QUESTIONNAIRES
4. TROUBLE RELAXING: SEVERAL DAYS
GAD7 TOTAL SCORE: 2
IF YOU CHECKED OFF ANY PROBLEMS ON THIS QUESTIONNAIRE, HOW DIFFICULT HAVE THESE PROBLEMS MADE IT FOR YOU TO DO YOUR WORK, TAKE CARE OF THINGS AT HOME, OR GET ALONG WITH OTHER PEOPLE: NOT DIFFICULT AT ALL
8. IF YOU CHECKED OFF ANY PROBLEMS, HOW DIFFICULT HAVE THESE MADE IT FOR YOU TO DO YOUR WORK, TAKE CARE OF THINGS AT HOME, OR GET ALONG WITH OTHER PEOPLE?: NOT DIFFICULT AT ALL
5. BEING SO RESTLESS THAT IT IS HARD TO SIT STILL: NOT AT ALL
1. FEELING NERVOUS, ANXIOUS, OR ON EDGE: NOT AT ALL
7. FEELING AFRAID AS IF SOMETHING AWFUL MIGHT HAPPEN: NOT AT ALL
7. FEELING AFRAID AS IF SOMETHING AWFUL MIGHT HAPPEN: NOT AT ALL
3. WORRYING TOO MUCH ABOUT DIFFERENT THINGS: NOT AT ALL
GAD7 TOTAL SCORE: 2
6. BECOMING EASILY ANNOYED OR IRRITABLE: SEVERAL DAYS
2. NOT BEING ABLE TO STOP OR CONTROL WORRYING: NOT AT ALL

## 2023-04-03 NOTE — PROGRESS NOTES
Rach is a 32 year old who is being evaluated via a billable video visit.      How would you like to obtain your AVS? MyChart  If the video visit is dropped, the invitation should be resent by: Text to cell phone: [phone]  Will anyone else be joining your video visit? No    Assessment & Plan     Acute sinusitis with symptoms > 10 days  Persistent sinus symptoms-will treat with antibiotics.  Encourage probiotics while on antibiotics.   If may take several days of antibiotics before feeling better.   Also can use Sudafed, a prescription is not needed, this is behind the counter with the pharmacist;   Encourage nasal spray such as NASONEX, NASOCORT OR FLONASE, and/or Neti pot or Neilmed sinus rinses with Distilled water only.  Tylenol or ibuprofen for headache discomfort or fever.   Red flag symptoms discussed and if these occur present to the emergency room or call 911.  Rach verbalizes understanding of plan of care and is in agreement.     - amoxicillin-clavulanate (AUGMENTIN) 875-125 MG tablet  Dispense: 14 tablet; Refill: 0        Return in about 10 days (around 4/13/2023) for if symptoms persist or worsening please be seen.         GUANAKO Martinez [facility]   Rach is a 32 year old, presenting for the following health issues:  Sinus Problem      History of Present Illness       Reason for visit:  Cold symptoms with a lot of congestion  Symptom onset:  3-7 days ago  Symptoms include:  Stuffy nose, headache  Symptom intensity:  Moderate  Symptom progression:  Worsening  Had these symptoms before:  No  What makes it worse:  If my head is lower than my body  What makes it better:  Laying with my head elevated    She eats 2-3 servings of fruits and vegetables daily.She consumes 0 sweetened beverage(s) daily.She exercises with enough effort to increase her heart rate 30 to 60 minutes per day.  She exercises with enough effort to increase her heart rate 5 days  per week.   She is taking medications regularly.  Today's MYNOR-7 Score: 2       Acute Illness  Acute illness concerns: Nasal Congestion and Headache  Onset/Duration: x1 week  Symptoms:  Fever: No  Chills/Sweats: No  Headache (location?): YES- pressure  Sinus Pressure: YES  Conjunctivitis:  YES- pressure  Ear Pain: YES- little pressure  Rhinorrhea: YES- green  Congestion: YES- head and nasal  Sore Throat: No  Cough: no  Wheeze: No  Decreased Appetite: No  Nausea: No  Vomiting: No  Diarrhea: No  Dysuria/Freq.: No  Dysuria or Hematuria: No  Fatigue/Achiness: No  Sick/Strep Exposure: YES- 2 kids in   Therapies tried and outcome: Sudafed , dayquil - minor relief      Sinus pressure X 3 days  Nasal congestion over 7.  Headache.   Neg COVID testing.       Review of Systems   Constitutional, HEENT, cardiovascular, pulmonary, GI, , musculoskeletal, neuro, skin, endocrine and psych systems are negative, except as otherwise noted in the HPI.      Objective           Vitals:  No vitals were obtained today due to virtual visit.    Physical Exam   GENERAL: Healthy, alert and no distress, but appears ill  EYES: Eyes grossly normal to inspection.  No discharge or erythema, or obvious scleral/conjunctival abnormalities.  RESP: No audible wheeze, cough, or visible cyanosis.  No visible retractions or increased work of breathing.  Stuffy/nasal congestion appreciated with talking.   SKIN: Visible skin clear. No significant rash, abnormal pigmentation or lesions.  NEURO: Cranial nerves grossly intact.  Mentation and speech appropriate for age.  PSYCH: Mentation appears normal, affect normal/bright, judgement and insight intact, normal speech and appearance well-groomed.      Video-Visit Details    Type of service:  Video Visit     Originating Location (pt. Location): Home  Distant Location (provider location):  On-site  Platform used for Video Visit: [employer]

## 2023-05-30 ENCOUNTER — PATIENT OUTREACH (OUTPATIENT)
Dept: CARE COORDINATION | Facility: CLINIC | Age: 33
End: 2023-05-30
Payer: COMMERCIAL

## 2023-07-05 ASSESSMENT — ENCOUNTER SYMPTOMS
DIZZINESS: 0
CONSTIPATION: 0
FREQUENCY: 0
HEMATURIA: 0
SORE THROAT: 0
EYE PAIN: 0
DIARRHEA: 0
ABDOMINAL PAIN: 0
PARESTHESIAS: 0
CHILLS: 0
COUGH: 0
HEARTBURN: 0
JOINT SWELLING: 0
FEVER: 0
NAUSEA: 0
MYALGIAS: 0
SHORTNESS OF BREATH: 0
BREAST MASS: 0
HEMATOCHEZIA: 0
ARTHRALGIAS: 0
PALPITATIONS: 0
NERVOUS/ANXIOUS: 0
WEAKNESS: 0
HEADACHES: 0
DYSURIA: 0

## 2023-07-11 ENCOUNTER — OFFICE VISIT (OUTPATIENT)
Dept: FAMILY MEDICINE | Facility: CLINIC | Age: 33
End: 2023-07-11
Payer: COMMERCIAL

## 2023-07-11 VITALS
SYSTOLIC BLOOD PRESSURE: 110 MMHG | HEIGHT: 70 IN | WEIGHT: 150 LBS | OXYGEN SATURATION: 100 % | RESPIRATION RATE: 16 BRPM | HEART RATE: 75 BPM | TEMPERATURE: 97.3 F | DIASTOLIC BLOOD PRESSURE: 70 MMHG | BODY MASS INDEX: 21.47 KG/M2

## 2023-07-11 DIAGNOSIS — B00.9 HSV (HERPES SIMPLEX VIRUS) INFECTION: ICD-10-CM

## 2023-07-11 DIAGNOSIS — Z00.00 ROUTINE ADULT HEALTH MAINTENANCE: Primary | ICD-10-CM

## 2023-07-11 DIAGNOSIS — Z12.4 CERVICAL CANCER SCREENING: ICD-10-CM

## 2023-07-11 DIAGNOSIS — B36.0 TINEA VERSICOLOR: ICD-10-CM

## 2023-07-11 PROBLEM — Z87.59 HISTORY OF SEVERE PRE-ECLAMPSIA: Status: RESOLVED | Noted: 2020-07-10 | Resolved: 2023-07-11

## 2023-07-11 PROBLEM — G97.1 HEADACHE AFTER SPINAL PUNCTURE: Status: RESOLVED | Noted: 2021-11-23 | Resolved: 2023-07-11

## 2023-07-11 LAB
ANION GAP SERPL CALCULATED.3IONS-SCNC: 11 MMOL/L (ref 7–15)
BUN SERPL-MCNC: 14.1 MG/DL (ref 6–20)
CALCIUM SERPL-MCNC: 9 MG/DL (ref 8.6–10)
CHLORIDE SERPL-SCNC: 102 MMOL/L (ref 98–107)
CREAT SERPL-MCNC: 1.11 MG/DL (ref 0.51–0.95)
DEPRECATED HCO3 PLAS-SCNC: 25 MMOL/L (ref 22–29)
ERYTHROCYTE [DISTWIDTH] IN BLOOD BY AUTOMATED COUNT: 12.1 % (ref 10–15)
GFR SERPL CREATININE-BSD FRML MDRD: 68 ML/MIN/1.73M2
GLUCOSE SERPL-MCNC: 81 MG/DL (ref 70–99)
HCT VFR BLD AUTO: 38.3 % (ref 35–47)
HGB BLD-MCNC: 12.9 G/DL (ref 11.7–15.7)
MCH RBC QN AUTO: 29.1 PG (ref 26.5–33)
MCHC RBC AUTO-ENTMCNC: 33.7 G/DL (ref 31.5–36.5)
MCV RBC AUTO: 86 FL (ref 78–100)
PLATELET # BLD AUTO: 247 10E3/UL (ref 150–450)
POTASSIUM SERPL-SCNC: 4.3 MMOL/L (ref 3.4–5.3)
RBC # BLD AUTO: 4.44 10E6/UL (ref 3.8–5.2)
SODIUM SERPL-SCNC: 138 MMOL/L (ref 136–145)
WBC # BLD AUTO: 6.8 10E3/UL (ref 4–11)

## 2023-07-11 PROCEDURE — 80048 BASIC METABOLIC PNL TOTAL CA: CPT | Performed by: PHYSICIAN ASSISTANT

## 2023-07-11 PROCEDURE — 99213 OFFICE O/P EST LOW 20 MIN: CPT | Mod: 25 | Performed by: PHYSICIAN ASSISTANT

## 2023-07-11 PROCEDURE — G0145 SCR C/V CYTO,THINLAYER,RESCR: HCPCS | Performed by: PHYSICIAN ASSISTANT

## 2023-07-11 PROCEDURE — 85027 COMPLETE CBC AUTOMATED: CPT | Performed by: PHYSICIAN ASSISTANT

## 2023-07-11 PROCEDURE — 36415 COLL VENOUS BLD VENIPUNCTURE: CPT | Performed by: PHYSICIAN ASSISTANT

## 2023-07-11 PROCEDURE — 87624 HPV HI-RISK TYP POOLED RSLT: CPT | Performed by: PHYSICIAN ASSISTANT

## 2023-07-11 PROCEDURE — 99395 PREV VISIT EST AGE 18-39: CPT | Performed by: PHYSICIAN ASSISTANT

## 2023-07-11 RX ORDER — KETOCONAZOLE 20 MG/G
CREAM TOPICAL DAILY
Qty: 30 G | Refills: 1 | Status: SHIPPED | OUTPATIENT
Start: 2023-07-11 | End: 2024-08-06

## 2023-07-11 RX ORDER — VALACYCLOVIR HYDROCHLORIDE 500 MG/1
500 TABLET, FILM COATED ORAL 2 TIMES DAILY
Qty: 30 TABLET | Refills: 0 | Status: SHIPPED | OUTPATIENT
Start: 2023-07-11

## 2023-07-11 RX ORDER — MINOCYCLINE HYDROCHLORIDE 100 MG/1
1 TABLET ORAL
COMMUNITY
Start: 2023-04-24 | End: 2024-08-06

## 2023-07-11 ASSESSMENT — ENCOUNTER SYMPTOMS
PALPITATIONS: 0
SORE THROAT: 0
HEMATURIA: 0
MYALGIAS: 0
ABDOMINAL PAIN: 0
HEMATOCHEZIA: 0
DIARRHEA: 0
PARESTHESIAS: 0
HEARTBURN: 0
BREAST MASS: 0
FREQUENCY: 0
SHORTNESS OF BREATH: 0
CHILLS: 0
WEAKNESS: 0
FEVER: 0
JOINT SWELLING: 0
EYE PAIN: 0
DIZZINESS: 0
NERVOUS/ANXIOUS: 0
COUGH: 0
NAUSEA: 0
ARTHRALGIAS: 0
DYSURIA: 0
HEADACHES: 0
CONSTIPATION: 0

## 2023-07-11 ASSESSMENT — ANXIETY QUESTIONNAIRES
4. TROUBLE RELAXING: SEVERAL DAYS
7. FEELING AFRAID AS IF SOMETHING AWFUL MIGHT HAPPEN: NOT AT ALL
6. BECOMING EASILY ANNOYED OR IRRITABLE: SEVERAL DAYS
5. BEING SO RESTLESS THAT IT IS HARD TO SIT STILL: NOT AT ALL
GAD7 TOTAL SCORE: 4
3. WORRYING TOO MUCH ABOUT DIFFERENT THINGS: SEVERAL DAYS
1. FEELING NERVOUS, ANXIOUS, OR ON EDGE: SEVERAL DAYS
IF YOU CHECKED OFF ANY PROBLEMS ON THIS QUESTIONNAIRE, HOW DIFFICULT HAVE THESE PROBLEMS MADE IT FOR YOU TO DO YOUR WORK, TAKE CARE OF THINGS AT HOME, OR GET ALONG WITH OTHER PEOPLE: NOT DIFFICULT AT ALL
2. NOT BEING ABLE TO STOP OR CONTROL WORRYING: NOT AT ALL
GAD7 TOTAL SCORE: 4

## 2023-07-11 ASSESSMENT — PAIN SCALES - GENERAL: PAINLEVEL: NO PAIN (0)

## 2023-07-11 NOTE — PATIENT INSTRUCTIONS

## 2023-07-11 NOTE — PROGRESS NOTES
SUBJECTIVE:   CC: Rach is an 32 year old who presents for preventive health visit.       2023     1:31 PM   Additional Questions   Roomed by Zeeshan Munoz   Accompanied by Self     Healthy Habits:     Getting at least 3 servings of Calcium per day:  Yes    Bi-annual eye exam:  Yes    Dental care twice a year:  Yes    Sleep apnea or symptoms of sleep apnea:  None    Diet:  Regular (no restrictions)    Frequency of exercise:  4-5 days/week    Duration of exercise:  15-30 minutes    Taking medications regularly:  Yes    Medication side effects:  None    Additional concerns today:  No    Shauna here today for RHM    No particular concerns    Gets irregular bleeding with Nexplanon but not wanting any change right now    Today's PHQ-2 Score:       2023     1:07 PM   PHQ-2 (  Pfizer)   Q1: Little interest or pleasure in doing things 0   Q2: Feeling down, depressed or hopeless 0   PHQ-2 Score 0   Q1: Little interest or pleasure in doing things Not at all   Q2: Feeling down, depressed or hopeless Not at all   PHQ-2 Score 0     Social History     Tobacco Use     Smoking status: Never     Smokeless tobacco: Never     Tobacco comments:     Non smoking home   Substance Use Topics     Alcohol use: Not Currently     Comment: four drinks once every two weeks.           2023     9:30 AM   Alcohol Use   Prescreen: >3 drinks/day or >7 drinks/week? Not Applicable     Reviewed orders with patient.  Reviewed health maintenance and updated orders accordingly - Yes    Breast Cancer Screenin/23/2022     2:36 PM   Breast CA Risk Assessment (FHS-7)   Do you have a family history of breast, colon, or ovarian cancer? No / Unknown       Patient under 40 years of age: Routine Mammogram Screening not recommended.   Pertinent mammograms are reviewed under the imaging tab.    History of abnormal Pap smear: NO - age 30-65 PAP every 5 years with negative HPV co-testing recommended      2020     9:53 AM 2017      "1:25 PM 5/23/2014    12:00 AM   PAP / HPV   PAP (Historical) NIL  NIL  NIL      Reviewed and updated as needed this visit by clinical staff   Tobacco  Allergies  Meds  Problems  Med Hx  Surg Hx  Fam Hx          Reviewed and updated as needed this visit by Provider   Tobacco   Meds  Problems  Med Hx  Surg Hx  Fam Hx           Review of Systems   Constitutional: Negative for chills and fever.   HENT: Negative for congestion, ear pain, hearing loss and sore throat.    Eyes: Negative for pain and visual disturbance.   Respiratory: Negative for cough and shortness of breath.    Cardiovascular: Negative for chest pain, palpitations and peripheral edema.   Gastrointestinal: Negative for abdominal pain, constipation, diarrhea, heartburn, hematochezia and nausea.   Breasts:  Negative for tenderness, breast mass and discharge.   Genitourinary: Negative for dysuria, frequency, genital sores, hematuria, pelvic pain, urgency, vaginal bleeding and vaginal discharge.   Musculoskeletal: Negative for arthralgias, joint swelling and myalgias.   Skin: Negative for rash.   Neurological: Negative for dizziness, weakness, headaches and paresthesias.   Psychiatric/Behavioral: Negative for mood changes. The patient is not nervous/anxious.         OBJECTIVE:   /70 (BP Location: Right arm, Patient Position: Sitting, Cuff Size: Adult Regular)   Pulse 75   Temp 97.3  F (36.3  C) (Tympanic)   Resp 16   Ht 1.778 m (5' 10\")   Wt 68 kg (150 lb)   LMP  (LMP Unknown)   SpO2 100%   Breastfeeding No   BMI 21.52 kg/m    Physical Exam  GENERAL: healthy, alert and no distress  EYES: Eyes grossly normal to inspection, PERRL and conjunctivae and sclerae normal  HENT: ear canals and TM's normal, nose and mouth without ulcers or lesions  NECK: no adenopathy, no asymmetry, masses, or scars and thyroid normal to palpation  RESP: lungs clear to auscultation - no rales, rhonchi or wheezes  BREAST: normal without masses, tenderness or " nipple discharge and no palpable axillary masses or adenopathy  CV: regular rate and rhythm, normal S1 S2, no S3 or S4, no murmur, click or rub, no peripheral edema and peripheral pulses strong  ABDOMEN: soft, nontender, no hepatosplenomegaly, no masses and bowel sounds normal   (female): External genitalia normal, urethra normal, vagina normal, cervix normal without lesions. Uterus, ovaries, bladder, adnexa normal per bimanual exam. Pap collected.  MS: no gross musculoskeletal defects noted, no edema  SKIN: on back and chest there are few hyperpigmented patches with scale  NEURO: Normal strength and tone, mentation intact and speech normal  PSYCH: mentation appears normal, affect normal/bright      ASSESSMENT/PLAN:   Rach was seen today for physical and gyn exam.    Diagnoses and all orders for this visit:    Routine adult health maintenance  -     REVIEW OF HEALTH MAINTENANCE PROTOCOL ORDERS  -     CBC with platelets; Future  -     Basic metabolic panel; Future  -     CBC with platelets  -     Basic metabolic panel    HSV (herpes simplex virus) infection - uses for symptomatic management, not yet due for refills but would like one on file.   -     valACYclovir (VALTREX) 500 MG tablet; Take 1 tablet (500 mg) by mouth 2 times daily    Tinea versicolor - treat with topical antifungal BID  -     ketoconazole (NIZORAL) 2 % external cream; Apply topically daily    Cervical cancer screening  -     Pap Screen with HPV - recommended age 30 - 65 years      COUNSELING:  Reviewed preventive health counseling, as reflected in patient instructions    She reports that she has never smoked. She has never used smokeless tobacco.          Mireille Deshpande PA-C  [facility]  Answers for HPI/ROS submitted by the patient on 7/11/2023  MYNOR 7 TOTAL SCORE: 4

## 2023-07-16 LAB
BKR LAB AP GYN ADEQUACY: NORMAL
BKR LAB AP GYN INTERPRETATION: NORMAL
BKR LAB AP HPV REFLEX: NORMAL
BKR LAB AP PREVIOUS ABNORMAL: NORMAL
PATH REPORT.COMMENTS IMP SPEC: NORMAL
PATH REPORT.COMMENTS IMP SPEC: NORMAL
PATH REPORT.RELEVANT HX SPEC: NORMAL

## 2023-07-18 LAB
HUMAN PAPILLOMA VIRUS 16 DNA: NEGATIVE
HUMAN PAPILLOMA VIRUS 18 DNA: NEGATIVE
HUMAN PAPILLOMA VIRUS FINAL DIAGNOSIS: NORMAL
HUMAN PAPILLOMA VIRUS OTHER HR: NEGATIVE

## 2024-03-12 ENCOUNTER — TRANSFERRED RECORDS (OUTPATIENT)
Dept: HEALTH INFORMATION MANAGEMENT | Facility: CLINIC | Age: 34
End: 2024-03-12
Payer: COMMERCIAL

## 2024-07-31 SDOH — HEALTH STABILITY: PHYSICAL HEALTH: ON AVERAGE, HOW MANY DAYS PER WEEK DO YOU ENGAGE IN MODERATE TO STRENUOUS EXERCISE (LIKE A BRISK WALK)?: 5 DAYS

## 2024-07-31 SDOH — HEALTH STABILITY: PHYSICAL HEALTH: ON AVERAGE, HOW MANY MINUTES DO YOU ENGAGE IN EXERCISE AT THIS LEVEL?: 30 MIN

## 2024-07-31 ASSESSMENT — SOCIAL DETERMINANTS OF HEALTH (SDOH): HOW OFTEN DO YOU GET TOGETHER WITH FRIENDS OR RELATIVES?: ONCE A WEEK

## 2024-08-06 ENCOUNTER — OFFICE VISIT (OUTPATIENT)
Dept: FAMILY MEDICINE | Facility: CLINIC | Age: 34
End: 2024-08-06
Payer: COMMERCIAL

## 2024-08-06 VITALS
BODY MASS INDEX: 21.63 KG/M2 | WEIGHT: 151.1 LBS | OXYGEN SATURATION: 100 % | TEMPERATURE: 97.7 F | HEIGHT: 70 IN | DIASTOLIC BLOOD PRESSURE: 70 MMHG | SYSTOLIC BLOOD PRESSURE: 113 MMHG | RESPIRATION RATE: 18 BRPM | HEART RATE: 64 BPM

## 2024-08-06 DIAGNOSIS — Z30.46 NEXPLANON REMOVAL: ICD-10-CM

## 2024-08-06 DIAGNOSIS — Z30.430 ENCOUNTER FOR IUD INSERTION: ICD-10-CM

## 2024-08-06 DIAGNOSIS — Z00.00 ROUTINE GENERAL MEDICAL EXAMINATION AT A HEALTH CARE FACILITY: Primary | ICD-10-CM

## 2024-08-06 LAB
ANION GAP SERPL CALCULATED.3IONS-SCNC: 9 MMOL/L (ref 7–15)
BUN SERPL-MCNC: 10.2 MG/DL (ref 6–20)
CALCIUM SERPL-MCNC: 9.4 MG/DL (ref 8.8–10.4)
CHLORIDE SERPL-SCNC: 101 MMOL/L (ref 98–107)
CREAT SERPL-MCNC: 0.92 MG/DL (ref 0.51–0.95)
EGFRCR SERPLBLD CKD-EPI 2021: 84 ML/MIN/1.73M2
ERYTHROCYTE [DISTWIDTH] IN BLOOD BY AUTOMATED COUNT: 11.9 % (ref 10–15)
GLUCOSE SERPL-MCNC: 96 MG/DL (ref 70–99)
HCO3 SERPL-SCNC: 27 MMOL/L (ref 22–29)
HCT VFR BLD AUTO: 41.4 % (ref 35–47)
HGB BLD-MCNC: 13.6 G/DL (ref 11.7–15.7)
MCH RBC QN AUTO: 29.1 PG (ref 26.5–33)
MCHC RBC AUTO-ENTMCNC: 32.9 G/DL (ref 31.5–36.5)
MCV RBC AUTO: 89 FL (ref 78–100)
PLATELET # BLD AUTO: 264 10E3/UL (ref 150–450)
POTASSIUM SERPL-SCNC: 4.3 MMOL/L (ref 3.4–5.3)
RBC # BLD AUTO: 4.67 10E6/UL (ref 3.8–5.2)
SODIUM SERPL-SCNC: 137 MMOL/L (ref 135–145)
WBC # BLD AUTO: 6.7 10E3/UL (ref 4–11)

## 2024-08-06 PROCEDURE — 99395 PREV VISIT EST AGE 18-39: CPT | Mod: 25 | Performed by: PHYSICIAN ASSISTANT

## 2024-08-06 PROCEDURE — 85027 COMPLETE CBC AUTOMATED: CPT | Performed by: PHYSICIAN ASSISTANT

## 2024-08-06 PROCEDURE — 80048 BASIC METABOLIC PNL TOTAL CA: CPT | Performed by: PHYSICIAN ASSISTANT

## 2024-08-06 PROCEDURE — 36415 COLL VENOUS BLD VENIPUNCTURE: CPT | Performed by: PHYSICIAN ASSISTANT

## 2024-08-06 PROCEDURE — 11982 REMOVE DRUG IMPLANT DEVICE: CPT | Performed by: PHYSICIAN ASSISTANT

## 2024-08-06 PROCEDURE — 58300 INSERT INTRAUTERINE DEVICE: CPT | Performed by: PHYSICIAN ASSISTANT

## 2024-08-06 ASSESSMENT — ANXIETY QUESTIONNAIRES
GAD7 TOTAL SCORE: 1
7. FEELING AFRAID AS IF SOMETHING AWFUL MIGHT HAPPEN: NOT AT ALL
5. BEING SO RESTLESS THAT IT IS HARD TO SIT STILL: NOT AT ALL
GAD7 TOTAL SCORE: 1
7. FEELING AFRAID AS IF SOMETHING AWFUL MIGHT HAPPEN: NOT AT ALL
8. IF YOU CHECKED OFF ANY PROBLEMS, HOW DIFFICULT HAVE THESE MADE IT FOR YOU TO DO YOUR WORK, TAKE CARE OF THINGS AT HOME, OR GET ALONG WITH OTHER PEOPLE?: NOT DIFFICULT AT ALL
2. NOT BEING ABLE TO STOP OR CONTROL WORRYING: NOT AT ALL
6. BECOMING EASILY ANNOYED OR IRRITABLE: SEVERAL DAYS
1. FEELING NERVOUS, ANXIOUS, OR ON EDGE: NOT AT ALL
4. TROUBLE RELAXING: NOT AT ALL
3. WORRYING TOO MUCH ABOUT DIFFERENT THINGS: NOT AT ALL
IF YOU CHECKED OFF ANY PROBLEMS ON THIS QUESTIONNAIRE, HOW DIFFICULT HAVE THESE PROBLEMS MADE IT FOR YOU TO DO YOUR WORK, TAKE CARE OF THINGS AT HOME, OR GET ALONG WITH OTHER PEOPLE: NOT DIFFICULT AT ALL

## 2024-08-06 ASSESSMENT — PAIN SCALES - GENERAL: PAINLEVEL: NO PAIN (0)

## 2024-08-06 NOTE — PROGRESS NOTES
Nexplanon Removal:     Is a pregnancy test required: No.  Was a consent obtained?  Yes    Rach Peterson is here for removal of etonogestrel implant Nexplanon/Implanon    Indication: change in contraception      Preoperative Diagnosis: etonogestrel implant  Postoperative Diagnosis: etonogestrel implant removed    Technique: On the left arm  Skin prep Betadine  Anesthesia 1% lidocaine, with epi  Procedure: Small incision (<5mm) was made at distal end of palpable implant, curved hemostat or mosquito forceps was used to isolate the implant and bring it to the incision, the fibrous capsule containing the implant  was incised and the Implant was removed intact.    EBL: minimal  Complications:  No  Tolerance:  Pt tolerated procedure well and was in stable condition.   Dressing:    A pressure bandage was placed for the next 12-24 hours.    Contraception was discussed and patient chose the following method Mirena IUD      Follow up: Pt was instructed to call if bleeding, severe pain or foul smell.     Procedure completed by student, Helena TRAMMELL and completely supervised by myself.    Mireille Deshpande PA-C

## 2024-08-06 NOTE — PATIENT INSTRUCTIONS
Patient Education   Preventive Care Advice   This is general advice given by our system to help you stay healthy. However, your care team may have specific advice just for you. Please talk to your care team about your preventive care needs.  Nutrition  Eat 5 or more servings of fruits and vegetables each day.  Try wheat bread, brown rice and whole grain pasta (instead of white bread, rice, and pasta).  Get enough calcium and vitamin D. Check the label on foods and aim for 100% of the RDA (recommended daily allowance).  Lifestyle  Exercise at least 150 minutes each week  (30 minutes a day, 5 days a week).  Do muscle strengthening activities 2 days a week. These help control your weight and prevent disease.  No smoking.  Wear sunscreen to prevent skin cancer.  Have a dental exam and cleaning every 6 months.  Yearly exams  See your health care team every year to talk about:  Any changes in your health.  Any medicines your care team has prescribed.  Preventive care, family planning, and ways to prevent chronic diseases.  Shots (vaccines)   HPV shots (up to age 26), if you've never had them before.  Hepatitis B shots (up to age 59), if you've never had them before.  COVID-19 shot: Get this shot when it's due.  Flu shot: Get a flu shot every year.  Tetanus shot: Get a tetanus shot every 10 years.  Pneumococcal, hepatitis A, and RSV shots: Ask your care team if you need these based on your risk.  Shingles shot (for age 50 and up)  General health tests  Diabetes screening:  Starting at age 35, Get screened for diabetes at least every 3 years.  If you are younger than age 35, ask your care team if you should be screened for diabetes.  Cholesterol test: At age 39, start having a cholesterol test every 5 years, or more often if advised.  Bone density scan (DEXA): At age 50, ask your care team if you should have this scan for osteoporosis (brittle bones).  Hepatitis C: Get tested at least once in your life.  STIs (sexually  transmitted infections)  Before age 24: Ask your care team if you should be screened for STIs.  After age 24: Get screened for STIs if you're at risk. You are at risk for STIs (including HIV) if:  You are sexually active with more than one person.  You don't use condoms every time.  You or a partner was diagnosed with a sexually transmitted infection.  If you are at risk for HIV, ask about PrEP medicine to prevent HIV.  Get tested for HIV at least once in your life, whether you are at risk for HIV or not.  Cancer screening tests  Cervical cancer screening: If you have a cervix, begin getting regular cervical cancer screening tests starting at age 21.  Breast cancer scan (mammogram): If you've ever had breasts, begin having regular mammograms starting at age 40. This is a scan to check for breast cancer.  Colon cancer screening: It is important to start screening for colon cancer at age 45.  Have a colonoscopy test every 10 years (or more often if you're at risk) Or, ask your provider about stool tests like a FIT test every year or Cologuard test every 3 years.  To learn more about your testing options, visit:   .  For help making a decision, visit:   https://bit.ly/xq61813.  Prostate cancer screening test: If you have a prostate, ask your care team if a prostate cancer screening test (PSA) at age 55 is right for you.  Lung cancer screening: If you are a current or former smoker ages 50 to 80, ask your care team if ongoing lung cancer screenings are right for you.  For informational purposes only. Not to replace the advice of your health care provider. Copyright   2023 [employer]. All rights reserved. Clinically reviewed by the [facility]. [facility] 541136 - REV 01/24.

## 2024-08-06 NOTE — PROGRESS NOTES
Preventive Care Visit  [facility]  Mireille Deshpande PA-C, Physician Assistant - Medical  Aug 6, 2024      Assessment & Plan     Routine general medical examination at a health care facility - routine labs ordered today, up to date on cervical cancer screening and immunizations.   - REVIEW OF HEALTH MAINTENANCE PROTOCOL ORDERS  - CBC with platelets; Future  - Basic metabolic panel; Future  - CBC with platelets  - Basic metabolic panel    Encounter for IUD insertion - risks/benefits discussed, counseled on different types of birth control. See procedure note above.   - levonorgestrel (MIRENA) 52 MG (20 mcg/day) IUD 1 each  - levonorgestrel (MIRENA) 52 MG (20 mcg/day) IUD; 1 each by Intrauterine route once for 1 dose  - INSERTION INTRAUTERINE DEVICE    Nexplanon removal - device removed without difficulty, see procedure note above.   - REMOVAL NEXPLANON    Counseling  Appropriate preventive services were addressed with this patient via screening, questionnaire, or discussion as appropriate for fall prevention, nutrition, physical activity, Tobacco-use cessation, weight loss and cognition.  Checklist reviewing preventive services available has been given to the patient.  Reviewed patient's diet, addressing concerns and/or questions.   She is at risk for psychosocial distress and has been provided with information to reduce risk.       JP Rodriguez     I personally supervised the student listed above. The above note is reflective of my independent physical exam and medical decision making.    Mireille Deshpande PA-C 8/6/2024, 12:14 PM  [facility]          Charlotte Loyd is a 33 year old, presenting for the following:  Physical and nexplanon insertion        8/6/2024     8:20 AM   Additional Questions   Roomed by Lukas RASMUSSEN   Accompanied by self        Health Care Directive  Patient does not have a Health Care Directive or Living Will: Discussed advance care planning  with patient; information given to patient to review.    MACKENZIE    Rach here today for RHM, and would like to discuss contraception options.     She currently has the Nexplanon, which was inserted in December 2021. Initially she had about 6 months of bleeding after insertion, which then stopped. Had some irregular spotting, no period with the Nexplanon. In the last 3 months she has had increased bleeding, about 1 day of bleeding 2x a week. Wondering about contraception alternatives. Has tried OCP in the past, but she is cautious about medications with systemic hormones at this time.     Sees dermatology for routine skin checks and spironolactone prescription. Has appointment with them later today.         7/31/2024   General Health   How would you rate your overall physical health? Excellent   Feel stress (tense, anxious, or unable to sleep) Only a little      (!) STRESS CONCERN      7/31/2024   Nutrition   Three or more servings of calcium each day? Yes   Diet: Regular (no restrictions)   How many servings of fruit and vegetables per day? (!) 2-3   How many sweetened beverages each day? 0-1          7/31/2024   Exercise   Days per week of moderate/strenous exercise 5 days   Average minutes spent exercising at this level 30 min          7/31/2024   Social Factors   Frequency of gathering with friends or relatives Once a week   Worry food won't last until get money to buy more No   Food not last or not have enough money for food? No   Do you have housing? (Housing is defined as stable permanent housing and does not include staying ouside in a car, in a tent, in an abandoned building, in an overnight shelter, or couch-surfing.) Yes   Are you worried about losing your housing? No   Lack of transportation? No   Unable to get utilities (heat,electricity)? No          7/31/2024   Dental   Dentist two times every year? Yes          7/31/2024   TB Screening   Were you born outside of the [place]? No      Today's PHQ-2 Score:  "      8/5/2024     1:47 PM   PHQ-2 ( 1999 [employer])   Q1: Little interest or pleasure in doing things 0   Q2: Feeling down, depressed or hopeless 0   PHQ-2 Score 0   Q1: Little interest or pleasure in doing things Not at all   Q2: Feeling down, depressed or hopeless Not at all   PHQ-2 Score 0         7/31/2024   Substance Use   Alcohol more than 3/day or more than 7/wk Not Applicable   Do you use any other substances recreationally? No      Social History     Tobacco Use    Smoking status: Never    Smokeless tobacco: Never    Tobacco comments:     Non smoking home   Vaping Use    Vaping status: Never Used   Substance Use Topics    Alcohol use: Not Currently     Comment: four drinks once every two weeks.    Drug use: Never             7/31/2024   Breast Cancer Screening   Family history of breast, colon, or ovarian cancer? No / Unknown       Mammogram Screening - Patient under 40 years of age: Routine Mammogram Screening not recommended.         7/31/2024   STI Screening   New sexual partner(s) since last STI/HIV test? No        History of abnormal Pap smear: No - age 30- 64 PAP with HPV every 5 years recommended        Latest Ref Rng & Units 7/11/2023     1:43 PM 8/25/2020     9:53 AM 5/5/2017     1:25 PM   PAP / HPV   PAP  Negative for Intraepithelial Lesion or Malignancy (NILM)      PAP (Historical)   NIL  NIL    HPV 16 DNA Negative Negative      HPV 18 DNA Negative Negative      Other HR HPV Negative Negative              7/31/2024   Contraception/Family Planning   Questions about contraception or family planning (!) YES - contraception discussed           Reviewed and updated as needed this visit by Provider   Tobacco  Allergies  Meds  Problems   Surg Hx  Fam Hx             Objective    Exam  /70 (BP Location: Right arm, Patient Position: Sitting, Cuff Size: Adult Regular)   Pulse 64   Temp 97.7  F (36.5  C) (Temporal)   Resp 18   Ht 1.765 m (5' 9.5\")   Wt 68.5 kg (151 lb 1.6 oz)   SpO2 100%   " "BMI 21.99 kg/m     Estimated body mass index is 21.99 kg/m  as calculated from the following:    Height as of this encounter: 1.765 m (5' 9.5\").    Weight as of this encounter: 68.5 kg (151 lb 1.6 oz).    Physical Exam  GENERAL: alert and no distress  HENT: ear canals and TM's normal, nose and mouth without ulcers or lesions  NECK: no adenopathy, no asymmetry, masses, or scars  RESP: lungs clear to auscultation - no rales, rhonchi or wheezes  BREAST: normal without masses, tenderness or nipple discharge and no palpable axillary masses or adenopathy  CV: regular rate and rhythm, normal S1 S2, no S3 or S4, no murmur, click or rub, no peripheral edema  ABDOMEN: soft, nontender, no hepatosplenomegaly, no masses and bowel sounds normal   (female) w/bimanual: normal female external genitalia, normal urethral meatus, normal vaginal mucosa, normal cervix/adnexa/uterus without masses. Normal discharge present.   MS: no gross musculoskeletal defects noted, no edema  SKIN: no suspicious lesions or rashes  PSYCH: mentation appears normal, affect normal/bright      Signed Electronically by: Mireille Deshpande PA-C    Answers submitted by the patient for this visit:  MYNOR-7 (Submitted on 8/6/2024)  MYNOR 7 TOTAL SCORE: 1    "

## 2024-11-01 ASSESSMENT — ANXIETY QUESTIONNAIRES: GAD7 TOTAL SCORE: 6

## 2024-11-02 ASSESSMENT — ANXIETY QUESTIONNAIRES
GAD7 TOTAL SCORE: 4
GAD7 TOTAL SCORE: 5

## 2024-11-04 ASSESSMENT — ANXIETY QUESTIONNAIRES: GAD7 TOTAL SCORE: 4

## 2024-11-05 ASSESSMENT — ANXIETY QUESTIONNAIRES: GAD7 TOTAL SCORE: 2

## 2025-08-05 SDOH — HEALTH STABILITY: PHYSICAL HEALTH: ON AVERAGE, HOW MANY DAYS PER WEEK DO YOU ENGAGE IN MODERATE TO STRENUOUS EXERCISE (LIKE A BRISK WALK)?: 5 DAYS

## 2025-08-05 SDOH — HEALTH STABILITY: PHYSICAL HEALTH: ON AVERAGE, HOW MANY MINUTES DO YOU ENGAGE IN EXERCISE AT THIS LEVEL?: 30 MIN

## 2025-08-05 ASSESSMENT — SOCIAL DETERMINANTS OF HEALTH (SDOH): HOW OFTEN DO YOU GET TOGETHER WITH FRIENDS OR RELATIVES?: ONCE A WEEK

## 2025-08-06 ENCOUNTER — OFFICE VISIT (OUTPATIENT)
Dept: FAMILY MEDICINE | Facility: CLINIC | Age: 35
End: 2025-08-06
Payer: COMMERCIAL

## 2025-08-06 VITALS
RESPIRATION RATE: 16 BRPM | OXYGEN SATURATION: 98 % | WEIGHT: 152 LBS | DIASTOLIC BLOOD PRESSURE: 74 MMHG | SYSTOLIC BLOOD PRESSURE: 112 MMHG | HEART RATE: 69 BPM | BODY MASS INDEX: 22.51 KG/M2 | TEMPERATURE: 97.6 F | HEIGHT: 69 IN

## 2025-08-06 DIAGNOSIS — Z13.0 SCREENING FOR ENDOCRINE, METABOLIC AND IMMUNITY DISORDER: ICD-10-CM

## 2025-08-06 DIAGNOSIS — Z13.228 SCREENING FOR ENDOCRINE, METABOLIC AND IMMUNITY DISORDER: ICD-10-CM

## 2025-08-06 DIAGNOSIS — Z13.29 SCREENING FOR ENDOCRINE, METABOLIC AND IMMUNITY DISORDER: ICD-10-CM

## 2025-08-06 DIAGNOSIS — Z13.220 SCREENING CHOLESTEROL LEVEL: ICD-10-CM

## 2025-08-06 DIAGNOSIS — Z13.0 SCREENING FOR DEFICIENCY ANEMIA: ICD-10-CM

## 2025-08-06 DIAGNOSIS — Z00.00 ROUTINE GENERAL MEDICAL EXAMINATION AT A HEALTH CARE FACILITY: Primary | ICD-10-CM

## 2025-08-06 PROBLEM — Z97.5 NEXPLANON IN PLACE: Status: RESOLVED | Noted: 2021-12-30 | Resolved: 2025-08-06

## 2025-08-06 LAB
ANION GAP SERPL CALCULATED.3IONS-SCNC: 10 MMOL/L (ref 7–15)
BUN SERPL-MCNC: 11.6 MG/DL (ref 6–20)
CALCIUM SERPL-MCNC: 9.4 MG/DL (ref 8.8–10.4)
CHLORIDE SERPL-SCNC: 101 MMOL/L (ref 98–107)
CHOLEST SERPL-MCNC: 153 MG/DL
CREAT SERPL-MCNC: 0.9 MG/DL (ref 0.51–0.95)
EGFRCR SERPLBLD CKD-EPI 2021: 86 ML/MIN/1.73M2
ERYTHROCYTE [DISTWIDTH] IN BLOOD BY AUTOMATED COUNT: 12.1 % (ref 10–15)
FASTING STATUS PATIENT QL REPORTED: NO
FASTING STATUS PATIENT QL REPORTED: NO
GLUCOSE SERPL-MCNC: 81 MG/DL (ref 70–99)
HCO3 SERPL-SCNC: 25 MMOL/L (ref 22–29)
HCT VFR BLD AUTO: 38.3 % (ref 35–47)
HDLC SERPL-MCNC: 64 MG/DL
HGB BLD-MCNC: 12.9 G/DL (ref 11.7–15.7)
LDLC SERPL CALC-MCNC: 76 MG/DL
MCH RBC QN AUTO: 30 PG (ref 26.5–33)
MCHC RBC AUTO-ENTMCNC: 33.7 G/DL (ref 31.5–36.5)
MCV RBC AUTO: 89 FL (ref 78–100)
NONHDLC SERPL-MCNC: 89 MG/DL
PLATELET # BLD AUTO: 247 10E3/UL (ref 150–450)
POTASSIUM SERPL-SCNC: 4.2 MMOL/L (ref 3.4–5.3)
RBC # BLD AUTO: 4.3 10E6/UL (ref 3.8–5.2)
SODIUM SERPL-SCNC: 136 MMOL/L (ref 135–145)
TRIGL SERPL-MCNC: 65 MG/DL
WBC # BLD AUTO: 5.7 10E3/UL (ref 4–11)

## 2025-08-06 PROCEDURE — 36415 COLL VENOUS BLD VENIPUNCTURE: CPT | Performed by: PHYSICIAN ASSISTANT

## 2025-08-06 PROCEDURE — 99395 PREV VISIT EST AGE 18-39: CPT | Performed by: PHYSICIAN ASSISTANT

## 2025-08-06 PROCEDURE — 80048 BASIC METABOLIC PNL TOTAL CA: CPT | Performed by: PHYSICIAN ASSISTANT

## 2025-08-06 PROCEDURE — 80061 LIPID PANEL: CPT | Performed by: PHYSICIAN ASSISTANT

## 2025-08-06 PROCEDURE — 85027 COMPLETE CBC AUTOMATED: CPT | Performed by: PHYSICIAN ASSISTANT

## 2025-08-06 ASSESSMENT — ANXIETY QUESTIONNAIRES
1. FEELING NERVOUS, ANXIOUS, OR ON EDGE: NOT AT ALL
7. FEELING AFRAID AS IF SOMETHING AWFUL MIGHT HAPPEN: NOT AT ALL
6. BECOMING EASILY ANNOYED OR IRRITABLE: NOT AT ALL
IF YOU CHECKED OFF ANY PROBLEMS ON THIS QUESTIONNAIRE, HOW DIFFICULT HAVE THESE PROBLEMS MADE IT FOR YOU TO DO YOUR WORK, TAKE CARE OF THINGS AT HOME, OR GET ALONG WITH OTHER PEOPLE: NOT DIFFICULT AT ALL
GAD7 TOTAL SCORE: 0
7. FEELING AFRAID AS IF SOMETHING AWFUL MIGHT HAPPEN: NOT AT ALL
3. WORRYING TOO MUCH ABOUT DIFFERENT THINGS: NOT AT ALL
4. TROUBLE RELAXING: NOT AT ALL
GAD7 TOTAL SCORE: 0
2. NOT BEING ABLE TO STOP OR CONTROL WORRYING: NOT AT ALL
8. IF YOU CHECKED OFF ANY PROBLEMS, HOW DIFFICULT HAVE THESE MADE IT FOR YOU TO DO YOUR WORK, TAKE CARE OF THINGS AT HOME, OR GET ALONG WITH OTHER PEOPLE?: NOT DIFFICULT AT ALL
5. BEING SO RESTLESS THAT IT IS HARD TO SIT STILL: NOT AT ALL
GAD7 TOTAL SCORE: 0

## (undated) DEVICE — SUCTION VACUUM CANISTER LG LID W/SOCK&CAPS BKC-506

## (undated) DEVICE — PAD CHUX UNDERPAD 30X36" P3036C

## (undated) DEVICE — GLOVE PROTEXIS MICRO 6.5  2D73PM65

## (undated) DEVICE — Device

## (undated) DEVICE — SUCTION VACUUM ADAPTER 14MM 003696

## (undated) DEVICE — GLOVE PROTEXIS BLUE W/NEU-THERA 6.5  2D73EB65

## (undated) DEVICE — LINEN TOWEL PACK X5 5464

## (undated) DEVICE — STRAP KNEE/BODY 31143004

## (undated) DEVICE — DECANTER TRANSFER DEVICE 2008S

## (undated) DEVICE — LINEN GOWN X4 5410

## (undated) DEVICE — SUCTION VACUUM CANISTER STANDARD W/LID&CAPS 003987-901

## (undated) DEVICE — SUCTION CANNULA UTERINE 12MM CVD  21555

## (undated) DEVICE — SOL NACL 0.9% IRRIG 1000ML BOTTLE 2F7124

## (undated) DEVICE — SOL WATER IRRIG 1000ML BOTTLE 2F7114

## (undated) DEVICE — LINEN LEG DRAPE 5457

## (undated) DEVICE — COVER PROBE ULTRASOUND 3D W/GEL 5X96" LF 20-P3D596

## (undated) DEVICE — SYR 01ML 27GA 0.5" NDL TBC 309623

## (undated) DEVICE — SUCTION CANNULA UTERINE 11MM CVD 21554

## (undated) RX ORDER — LIDOCAINE HYDROCHLORIDE 10 MG/ML
INJECTION, SOLUTION EPIDURAL; INFILTRATION; INTRACAUDAL; PERINEURAL
Status: DISPENSED
Start: 2019-07-03

## (undated) RX ORDER — KETOROLAC TROMETHAMINE 30 MG/ML
INJECTION, SOLUTION INTRAMUSCULAR; INTRAVENOUS
Status: DISPENSED
Start: 2019-07-03

## (undated) RX ORDER — FENTANYL CITRATE 50 UG/ML
INJECTION, SOLUTION INTRAMUSCULAR; INTRAVENOUS
Status: DISPENSED
Start: 2019-07-03

## (undated) RX ORDER — ACETAMINOPHEN 325 MG/1
TABLET ORAL
Status: DISPENSED
Start: 2021-11-19

## (undated) RX ORDER — PROPOFOL 10 MG/ML
INJECTION, EMULSION INTRAVENOUS
Status: DISPENSED
Start: 2019-07-03

## (undated) RX ORDER — METHYLERGONOVINE MALEATE 0.2 MG/ML
INJECTION INTRAVENOUS
Status: DISPENSED
Start: 2019-07-03

## (undated) RX ORDER — VASOPRESSIN 20 U/ML
INJECTION PARENTERAL
Status: DISPENSED
Start: 2019-07-03

## (undated) RX ORDER — DOXYCYCLINE 100 MG/10ML
INJECTION, POWDER, LYOPHILIZED, FOR SOLUTION INTRAVENOUS
Status: DISPENSED
Start: 2019-07-03